# Patient Record
Sex: MALE | Race: WHITE | NOT HISPANIC OR LATINO | Employment: FULL TIME | URBAN - METROPOLITAN AREA
[De-identification: names, ages, dates, MRNs, and addresses within clinical notes are randomized per-mention and may not be internally consistent; named-entity substitution may affect disease eponyms.]

---

## 2020-08-20 ENCOUNTER — OFFICE VISIT CONVERTED (OUTPATIENT)
Dept: CARDIOLOGY | Facility: CLINIC | Age: 35
End: 2020-08-20
Attending: SPECIALIST

## 2020-08-28 ENCOUNTER — CONVERSION ENCOUNTER (OUTPATIENT)
Dept: CARDIOLOGY | Facility: CLINIC | Age: 35
End: 2020-08-28
Attending: SPECIALIST

## 2020-09-03 ENCOUNTER — HOSPITAL ENCOUNTER (OUTPATIENT)
Dept: URGENT CARE | Facility: CLINIC | Age: 35
Discharge: HOME | End: 2020-09-03
Attending: FAMILY MEDICINE

## 2021-03-16 ENCOUNTER — OFFICE VISIT CONVERTED (OUTPATIENT)
Dept: CARDIOLOGY | Facility: CLINIC | Age: 36
End: 2021-03-16
Attending: SPECIALIST

## 2021-05-10 NOTE — PROCEDURES
"   Procedure Note      Patient Name: Regino Fox   Patient ID: [id number]   Sex: Male   YOB: 1985    Primary Care Provider: MARY SARMIENTO   Referring Provider: MARY SARMIENTO    Visit Date: August 28, 2020    Provider: Lan Pollard MD   Location: [facility]   Location Address: [address] [place] [address], [address]   [place], [place]  [id number]   Location Phone: [phone]          FINAL REPORT   TRANSTHORACIC ECHOCARDIOGRAM REPORT    Diagnosis: Biscuspid Aortic Valve   Height: 6'6\" Weight: 171 B/P: 130/70 BSA: 2.1   Tech: BNS   MEASUREMENTS:  RVID (Diastole) : RVID. (NORMAL: 0.7 to 2.4 cm max)   LVID (Systole): 4.5 cm (Diastole): 6.8 cm . (NORMAL: 3.7 - 5.4 cm)   Posterior Wall Thickness (Diastole): 0.9 cm. (NORMAL: 0.8 - 1.1 cm)   Septal Thickness (Diastole): 1.0 cm. (NORMAL: 0.7 - 1.2 cm)   LAID (Systole): 3.8 cm. (NORMAL: 1.9 - 3.8 cm)   Aortic Root Diameter (Diastole): 3.6 cm. (NORMAL: 2.0 - 3.7 cm)   DOPPLER:  E/A ratio 2.5 (NORMAL 0.8-2.0)   DT: 206 msec (NORMAL 140-240 msec.)   IVRT 69 m/sec (NORMAL  m/sec.)   E/E': 10 (NORMAL <8 avg.)   COMMENTS:  The patient underwent 2-D, M-Mode, and Doppler examination, including pulse-wave, continuous-wave, and color-flow analysis; the study is technically adequate.   FINDINGS:  AORTIC VALVE: Normal. Tricuspid in appearance with normal central closure.   MITRAL VALVE: Normal. Bicuspid in appearance.   TRICUSPID VALVE: Normal.   PULMONIC VALVE: Not well visualized.   LEFT ATRIUM: Normal. No intracavitary masses or clots seen. LA volume index is 28 mL/m2.   AORTIC ROOT: Normal in size with adequate motion.   LEFT VENTRICLE: Enlarged. Normal left ventricular systolic function. Ejection fraction 59%.   RIGHT ATRIUM: Normal.   RIGHT VENTRICLE: Normal size and function.   PERICARDIUM: Unremarkable. No evidence of effusion.   INFERIOR VENA CAVA: Diameter is 2.6 cm.   DOPPLER: Doppler examination of the aortic, mitral, tricuspid, and " pulmonary valves was performed. Normal pulmonary artery systolic pressure by Doppler. There is moderate mitral regurgitation. Mild aortic regurgitation. Trace tricuspid regurgitation.   Faxed: 09/01/2020      CONCLUSION:  1.  Normal left ventricular systolic function with enlarged left ventricle.   2.  Moderate mitral regurgitation.   3.  Mild aortic regurgitation.   4.  Trace tricuspid regurgitation.       MD HONG Cordova/brittney    This note was transcribed by Ignacia Banks.  brittney/HONG  The above service was transcribed by Ignacia Banks, and I attest to the accuracy of the note.  HONG                 Electronically Signed by: Talia Banks-, Other -Author on September 1, 2020 01:54:13 PM  Electronically Co-signed by: Lan Pollard MD -Reviewer on September 1, 2020 03:00:23 PM

## 2021-05-10 NOTE — H&P
History and Physical      Patient Name: Regino Fox   Patient ID: [id number]   Sex: Male   YOB: 1985    Primary Care Provider: MARY SARMIENTO   Referring Provider: MARY SARMIENTO    Visit Date: August 20, 2020    Provider: Lan Pollard MD   Location: [facility]   Location Address: [address] [place] [address], [address]   [place], [place]  [id number]   Location Phone: [phone]          Chief Complaint  · Shortness of breath       History Of Present Illness  Consult requested by: MARY SARMIENTO   Regino Fox is a 34-year-old male with history of bicuspid aortic valve, aortic regurgitation, not compliant with his follow-up visit. He comes in with shortness of breath on exertion, mostly on heavy exertion. No chest pain. No syncopal or presyncopal episode. No palpitations.   PAST MEDICAL HISTORY: Heart murmur since childhood; congestive heart failure; hypertension; bicuspid aortic valve.   FAMILY HISTORY: Positive for diabetes and hypertension. Negative for heart disease.   PSYCHOSOCIAL HISTORY: He is . Positive for history of mood change or depression. He rarely has caffeine or alcohol. He previously smoked but quit.   CURRENT MEDICATIONS: None.   ALLERGIES: No known drug allergies.   CHOLESTEROL STATUS: Unknown.       Review of Systems  · Constitutional  o Admits  o : good general health lately  o Denies  o : fatigue, recent weight changes   · Eyes  o Admits  o : double vision, blurred vision  · HENT  o Denies  o : hearing loss or ringing, chronic sinus problem, swollen glands in neck  · Cardiovascular  o Admits  o : chest pain, palpitations (fast, fluttering, or skipping beats), shortness of breath while walking or lying flat  o Denies  o : swelling (feet, ankles, hands)  · Respiratory  o Denies  o : asthma or wheezing, COPD  · Gastrointestinal  o Admits  o : ulcers  o Denies  o : nausea or vomiting  · Neurologic  o Denies  o : lightheaded or  "dizzy, stroke, headaches  · Musculoskeletal  o Admits  o : joint pain, back pain  · Endocrine  o Admits  o : heat or cold intolerance  o Denies  o : thyroid disease, diabetes, excessive thirst or urination  · Heme-Lymph  o Denies  o : bleeding or bruising tendency, anemia      Vitals  Date Time BP Position Site L\R Cuff Size HR RR TEMP (F) WT  HT  BMI kg/m2 BSA m2 O2 Sat HC       08/20/2020 02:33 /70 Sitting    84 - R   171lbs 0oz 6'  5\" 20.28 2.05           Physical Examination  · Constitutional  o Appearance  o : Awake, alert, cooperative, pleasant.  · Eyes  o Pupils and Irises  o : Pupils equal and reacting to light and accommodation.  · Ears, Nose, Mouth and Throat  o Ears  o : Tympanic membranes are normal.  o Nose  o : Clear with no maxillary tenderness.  o Oral Cavity  o : Clear.  · Neck  o Inspection/Palpation  o : No JVD, bruits, thyromegaly, or adenopathy. Trachea midline. No axillary or cervical lymphadenopathy.  o Thyroid  o : No thyroid enlargement.   · Respiratory  o Inspection of Chest  o : No chest wall deformities, moving equal.  o Auscultation of Lungs  o : Good air entry with vesicular breath sounds.  o Percussion of Chest  o : Resonant bilaterally.   o Palpation of Chest  o : Equal movements bilaterally.  · Cardiovascular  o Heart  o :   § Auscultation of Heart  § : PMI is in the 5th intercostal space. S1 and S2 regular. No S3. No S4. Diastolic murmur 2-3/6.  o Peripheral Vascular System  o :   § Extremities  § : No pedal edema. Peripheral pulses well felt. No cyanosis.  · Gastrointestinal  o Abdominal Examination  o : No organomegaly, masses, tenderness, bruits, or abnormal pulsations. Bowel sounds are normal.  · Musculoskeletal  o General  o : Muscle strength is normal with normal tone.  · Skin and Subcutaneous Tissue  o General Inspection  o : No rash, petechiae, or suspicious skin lesions. Skin turgor is normal.          Assessment     ASSESSMENT AND PLAN:  Bicuspid aortic valve, aortic " valve regurgitation, moderate.  Repeat echocardiogram to evaluate any worsening of his aortic regurgitation. EKG and other reports were reviewed which showed sinus rhythm.       MD HONG Cordova/brittney    This note was transcribed by Ignacia Banks.  brittney/HONG  The above service was transcribed by Ignacia Banks, and I attest to the accuracy of the note.  HONG             Electronically Signed by: Talia Banks-, Other -Author on August 21, 2020 02:07:29 PM  Electronically Co-signed by: Lan Pollard MD -Reviewer on August 24, 2020 09:16:33 AM

## 2021-05-14 VITALS
DIASTOLIC BLOOD PRESSURE: 80 MMHG | BODY MASS INDEX: 20.07 KG/M2 | HEART RATE: 80 BPM | SYSTOLIC BLOOD PRESSURE: 116 MMHG | HEIGHT: 77 IN | WEIGHT: 170 LBS

## 2021-05-14 NOTE — PROGRESS NOTES
"   Progress Note      Patient Name: Regino Fox   Patient ID: [id number]   Sex: Male   YOB: 1985    Primary Care Provider: MARY SARMIENTO   Referring Provider: MARY SARMIENTO    Visit Date: March 16, 2021    Provider: Lan Pollard MD   Location[facility]   Location Address: [address] [place] [address], [address]   [place], [place]  [id number]   Location Phone: [phone]          Chief Complaint     Palpitations.       History Of Present Illness  Regino Fox is a 35 year old /White male with a history of aortic valve disease. He denies any chest pain. No shortness of breath. He has some occasional palpitations.   CURRENT MEDICATIONS: Medication list was reviewed and is as documented.   PAST MEDICAL HISTORY: Bicuspid aortic valve; Congestive heart failure; Heart murmur since childhood; Hypertension.   PSYCHOSOCIAL HISTORY: Previously smoked, but quit. Rarely consumes alcohol.      ALLERGIES: No known drug allergies.       Review of Systems  · Cardiovascular  o Admits  o : palpitations (fast, fluttering, or skipping beats), shortness of breath while walking or lying flat  o Denies  o : swelling (feet, ankles, hands), chest pain or angina pectoris   · Respiratory  o Denies  o : chronic or frequent cough      Vitals  Date Time BP Position Site L\R Cuff Size HR RR TEMP (F) WT  HT  BMI kg/m2 BSA m2 O2 Sat FR L/min FiO2 HC       03/16/2021 02:56 /80 Sitting    80 - R   170lbs 0oz 6'  5\" 20.16 2.05             Physical Examination  · Constitutional  o Appearance  o : Awake, alert, cooperative, pleasant.  · Respiratory  o Inspection of Chest  o : No chest wall deformities, moving equal.  o Auscultation of Lungs  o : Good air entry with vesicular breath sounds.  · Cardiovascular  o Heart  o :   § Auscultation of Heart  § : S1 and S2 regular. No S3. No S4. Diastolic murmur grade 3/6 present.   o Peripheral Vascular System  o :   § Extremities  § : Peripheral pulses were " well felt. No edema. No cyanosis.  · Gastrointestinal  o Abdominal Examination  o : No masses or organomegaly noted.          Assessment     ASSESSMENT & PLAN:    1.  Moderate aortic valve disease/aortic valve regurgitation, stable.    2.  Palpitations.  Restart him back on metoprolol 25 mg once a day.  3.  Follow up with his PMD.  4.  See me back in 6 months.             Electronically Signed by: Tiffany Padgett-, Other -Author on March 26, 2021 11:25:15 AM  Electronically Co-signed by: Lan Pollard MD -Reviewer on March 29, 2021 09:37:05 AM

## 2021-05-15 VITALS
WEIGHT: 171 LBS | SYSTOLIC BLOOD PRESSURE: 130 MMHG | HEIGHT: 77 IN | DIASTOLIC BLOOD PRESSURE: 70 MMHG | BODY MASS INDEX: 20.19 KG/M2 | HEART RATE: 84 BPM

## 2021-07-16 ENCOUNTER — OFFICE VISIT (OUTPATIENT)
Dept: CARDIOLOGY | Facility: CLINIC | Age: 36
End: 2021-07-16

## 2021-07-16 VITALS
SYSTOLIC BLOOD PRESSURE: 126 MMHG | DIASTOLIC BLOOD PRESSURE: 76 MMHG | WEIGHT: 167 LBS | HEIGHT: 78 IN | BODY MASS INDEX: 19.32 KG/M2 | HEART RATE: 74 BPM

## 2021-07-16 DIAGNOSIS — I38 VHD (VALVULAR HEART DISEASE): Primary | ICD-10-CM

## 2021-07-16 DIAGNOSIS — R06.09 DYSPNEA ON EXERTION: ICD-10-CM

## 2021-07-16 DIAGNOSIS — Q23.1 BICUSPID AORTIC VALVE: ICD-10-CM

## 2021-07-16 PROCEDURE — 93000 ELECTROCARDIOGRAM COMPLETE: CPT | Performed by: INTERNAL MEDICINE

## 2021-07-16 PROCEDURE — 99214 OFFICE O/P EST MOD 30 MIN: CPT | Performed by: INTERNAL MEDICINE

## 2021-07-16 NOTE — PROGRESS NOTES
Chief Complaint  Palpitations    Subjective            Regino Fox presents to [facility]  History of Present Illness    Regino is here for follow-up evaluation management of valvular heart disease with bicuspid aortic valve, mitral and aortic regurgitation and history of palpitations.  He has been clinically stable, he has decided to change cardiology providers.  He has occasional dyspnea on exertion which seems to be somewhat irregular and unpredictable.  He denies significant palpitations.  He works full-time.  He denies fluid weight gain no syncope.    PMH  Past Medical History:   Diagnosis Date   • Asthma    • CHF (congestive heart failure) (CMS/[facility])    • Congenital heart disease    • Heart murmur    • Heart valve disease    • Hypertension    • Mitral valve prolapse    • Stroke (CMS/[facility])          SURGICALHX  Past Surgical History:   Procedure Laterality Date   • FEMUR SURGERY     • NECK SURGERY          SOC  Social History     Socioeconomic History   • Marital status: Single     Spouse name: Not on file   • Number of children: Not on file   • Years of education: Not on file   • Highest education level: Not on file   Tobacco Use   • Smoking status: Former Smoker   • Smokeless tobacco: Never Used   Vaping Use   • Vaping Use: Every day   • Substances: Nicotine   • Devices: Disposable   Substance and Sexual Activity   • Alcohol use: Yes     Comment: rarely   • Drug use: Never   • Sexual activity: Defer         FAMHX  Family History   Problem Relation Age of Onset   • Aortic aneurysm Mother    • Heart attack Mother    • No Known Problems Father           ALLERGY  No Known Allergies     MEDSCURRENT  No current outpatient medications on file.      Review of Systems   Constitutional: Positive for malaise/fatigue.   Cardiovascular: Positive for dyspnea on exertion. Negative for chest pain.   Respiratory: Positive for shortness of breath.         Objective     /76   Pulse 74    "Ht 198.1 cm (78\")   Wt 75.8 kg (167 lb)   BMI 19.30 kg/m²       General Appearance:   · well developed  · well nourished  HENT:   · oropharynx moist  · lips not cyanotic  Neck:  · thyroid not enlarged  · supple  Respiratory:  · no respiratory distress  · normal breath sounds  · no rales  Cardiovascular:  · no jugular venous distention  · regular rhythm  · apical impulse normal  · S1 normal, S2 normal  · no S3, no S4   · Soft parasternal diastolic murmur radiating to the apex  · no rub, no thrill  · carotid pulses normal; no bruit  · pedal pulses normal  · lower extremity edema: none    Musculoskeletal:  · no clubbing of fingers.   · normocephalic, head atraumatic  Skin:   · warm, dry  Psychiatric:  · judgement and insight appropriate  · normal mood and affect      Result Review :                    ECG 12 Lead    Date/Time: 7/16/2021 2:16 PM  Performed by: ADRIANNA Bernard MD  Authorized by: ADRIANNA Bernard MD   Previous ECG: no previous ECG available  Rhythm: sinus rhythm and sinus bradycardia  Conduction: conduction normal  ST Segments: ST segments normal  T Waves: T waves normal  QRS axis: normal  Other: no other findings    Clinical impression: normal ECG                     Assessment and Plan        ASSESSMENT:  Encounter Diagnoses   Name Primary?   • VHD (valvular heart disease) Yes   • Dyspnea on exertion    • Bicuspid aortic valve          PLAN:    1.  Regino is clinically stable, 2020 echo showed moderate mitral regurgitation and mild aortic regurgitation.  A repeat study will be scheduled to reevaluate his valvular disease  2.  Basic laboratory studies including CMP, lipids, TSH will be scheduled  3.  Otherwise I have encouraged him to remain active and he will be seen annually          Patient was given instructions and counseling regarding his condition or for health maintenance advice. Please see specific information pulled into the AVS if appropriate.             ADRIANNA Bernard, " MD  7/16/2021    14:15 EDT

## 2021-07-28 ENCOUNTER — TELEPHONE (OUTPATIENT)
Dept: CARDIOLOGY | Facility: CLINIC | Age: 36
End: 2021-07-28

## 2021-07-30 ENCOUNTER — APPOINTMENT (OUTPATIENT)
Dept: GENERAL RADIOLOGY | Facility: HOSPITAL | Age: 36
End: 2021-07-30

## 2021-07-30 ENCOUNTER — HOSPITAL ENCOUNTER (EMERGENCY)
Facility: HOSPITAL | Age: 36
Discharge: HOME | End: 2021-07-30
Attending: EMERGENCY MEDICINE | Admitting: EMERGENCY MEDICINE

## 2021-07-30 VITALS
HEART RATE: 62 BPM | WEIGHT: 168.65 LBS | DIASTOLIC BLOOD PRESSURE: 71 MMHG | HEIGHT: 78 IN | OXYGEN SATURATION: 97 % | BODY MASS INDEX: 19.51 KG/M2 | RESPIRATION RATE: 16 BRPM | TEMPERATURE: 98.1 F | SYSTOLIC BLOOD PRESSURE: 102 MMHG

## 2021-07-30 DIAGNOSIS — R07.9 CHEST PAIN, UNSPECIFIED TYPE: Primary | ICD-10-CM

## 2021-07-30 LAB
ALBUMIN SERPL-MCNC: 4.5 G/DL (ref 3.5–5.2)
ALBUMIN/GLOB SERPL: 1.7 G/DL
ALP SERPL-CCNC: 68 U/L (ref 39–117)
ALT SERPL W P-5'-P-CCNC: 12 U/L (ref 1–41)
ANION GAP SERPL CALCULATED.3IONS-SCNC: 9.6 MMOL/L (ref 5–15)
AST SERPL-CCNC: 16 U/L (ref 1–40)
BASOPHILS # BLD AUTO: 0.05 10*3/MM3 (ref 0–0.2)
BASOPHILS NFR BLD AUTO: 0.8 % (ref 0–1.5)
BILIRUB SERPL-MCNC: 0.5 MG/DL (ref 0–1.2)
BUN SERPL-MCNC: 13 MG/DL (ref 6–20)
BUN/CREAT SERPL: 12.9 (ref 7–25)
CALCIUM SPEC-SCNC: 9.7 MG/DL (ref 8.6–10.5)
CHLORIDE SERPL-SCNC: 106 MMOL/L (ref 98–107)
CK MB SERPL-CCNC: 1.36 NG/ML
CK SERPL-CCNC: 63 U/L (ref 20–200)
CO2 SERPL-SCNC: 26.4 MMOL/L (ref 22–29)
CREAT SERPL-MCNC: 1.01 MG/DL (ref 0.76–1.27)
DEPRECATED RDW RBC AUTO: 41.3 FL (ref 37–54)
EOSINOPHIL # BLD AUTO: 0.22 10*3/MM3 (ref 0–0.4)
EOSINOPHIL NFR BLD AUTO: 3.3 % (ref 0.3–6.2)
ERYTHROCYTE [DISTWIDTH] IN BLOOD BY AUTOMATED COUNT: 12.8 % (ref 12.3–15.4)
GFR SERPL CREATININE-BSD FRML MDRD: 83 ML/MIN/1.73
GLOBULIN UR ELPH-MCNC: 2.6 GM/DL
GLUCOSE SERPL-MCNC: 105 MG/DL (ref 65–99)
HCT VFR BLD AUTO: 47.9 % (ref 37.5–51)
HGB BLD-MCNC: 16.4 G/DL (ref 13–17.7)
HOLD SPECIMEN: NORMAL
IMM GRANULOCYTES # BLD AUTO: 0.02 10*3/MM3 (ref 0–0.05)
IMM GRANULOCYTES NFR BLD AUTO: 0.3 % (ref 0–0.5)
LIPASE SERPL-CCNC: 33 U/L (ref 13–60)
LYMPHOCYTES # BLD AUTO: 2.29 10*3/MM3 (ref 0.7–3.1)
LYMPHOCYTES NFR BLD AUTO: 34.8 % (ref 19.6–45.3)
MAGNESIUM SERPL-MCNC: 1.9 MG/DL (ref 1.6–2.6)
MCH RBC QN AUTO: 30.4 PG (ref 26.6–33)
MCHC RBC AUTO-ENTMCNC: 34.2 G/DL (ref 31.5–35.7)
MCV RBC AUTO: 88.9 FL (ref 79–97)
MONOCYTES # BLD AUTO: 0.6 10*3/MM3 (ref 0.1–0.9)
MONOCYTES NFR BLD AUTO: 9.1 % (ref 5–12)
NEUTROPHILS NFR BLD AUTO: 3.4 10*3/MM3 (ref 1.7–7)
NEUTROPHILS NFR BLD AUTO: 51.7 % (ref 42.7–76)
NRBC BLD AUTO-RTO: 0 /100 WBC (ref 0–0.2)
NT-PROBNP SERPL-MCNC: 41.1 PG/ML (ref 0–450)
PLATELET # BLD AUTO: 131 10*3/MM3 (ref 140–450)
PMV BLD AUTO: 12.8 FL (ref 6–12)
POTASSIUM SERPL-SCNC: 4.2 MMOL/L (ref 3.5–5.2)
PROT SERPL-MCNC: 7.1 G/DL (ref 6–8.5)
RBC # BLD AUTO: 5.39 10*6/MM3 (ref 4.14–5.8)
SODIUM SERPL-SCNC: 142 MMOL/L (ref 136–145)
TROPONIN I SERPL-MCNC: 0 NG/ML (ref 0–0.6)
TROPONIN I SERPL-MCNC: 0 NG/ML (ref 0–0.6)
WBC # BLD AUTO: 6.58 10*3/MM3 (ref 3.4–10.8)
WHOLE BLOOD HOLD SPECIMEN: NORMAL

## 2021-07-30 PROCEDURE — 83690 ASSAY OF LIPASE: CPT | Performed by: EMERGENCY MEDICINE

## 2021-07-30 PROCEDURE — 93010 ELECTROCARDIOGRAM REPORT: CPT | Performed by: INTERNAL MEDICINE

## 2021-07-30 PROCEDURE — 83735 ASSAY OF MAGNESIUM: CPT | Performed by: EMERGENCY MEDICINE

## 2021-07-30 PROCEDURE — 93005 ELECTROCARDIOGRAM TRACING: CPT | Performed by: EMERGENCY MEDICINE

## 2021-07-30 PROCEDURE — 80053 COMPREHEN METABOLIC PANEL: CPT | Performed by: EMERGENCY MEDICINE

## 2021-07-30 PROCEDURE — 99283 EMERGENCY DEPT VISIT LOW MDM: CPT

## 2021-07-30 PROCEDURE — 82550 ASSAY OF CK (CPK): CPT | Performed by: EMERGENCY MEDICINE

## 2021-07-30 PROCEDURE — 85025 COMPLETE CBC W/AUTO DIFF WBC: CPT | Performed by: EMERGENCY MEDICINE

## 2021-07-30 PROCEDURE — 84484 ASSAY OF TROPONIN QUANT: CPT

## 2021-07-30 PROCEDURE — 93005 ELECTROCARDIOGRAM TRACING: CPT

## 2021-07-30 PROCEDURE — 71045 X-RAY EXAM CHEST 1 VIEW: CPT

## 2021-07-30 PROCEDURE — 63710000001 ONDANSETRON ODT 4 MG TABLET DISPERSIBLE: Performed by: EMERGENCY MEDICINE

## 2021-07-30 PROCEDURE — 83880 ASSAY OF NATRIURETIC PEPTIDE: CPT | Performed by: EMERGENCY MEDICINE

## 2021-07-30 PROCEDURE — 82553 CREATINE MB FRACTION: CPT | Performed by: EMERGENCY MEDICINE

## 2021-07-30 RX ORDER — SODIUM CHLORIDE 0.9 % (FLUSH) 0.9 %
10 SYRINGE (ML) INJECTION AS NEEDED
Status: DISCONTINUED | OUTPATIENT
Start: 2021-07-30 | End: 2021-07-30 | Stop reason: HOSPADM

## 2021-07-30 RX ORDER — FAMOTIDINE 20 MG/1
20 TABLET, FILM COATED ORAL 2 TIMES DAILY
Qty: 30 TABLET | Refills: 0 | Status: SHIPPED | OUTPATIENT
Start: 2021-07-30 | End: 2022-01-25

## 2021-07-30 RX ORDER — ONDANSETRON 4 MG/1
4 TABLET, ORALLY DISINTEGRATING ORAL ONCE
Status: COMPLETED | OUTPATIENT
Start: 2021-07-30 | End: 2021-07-30

## 2021-07-30 RX ORDER — ONDANSETRON 4 MG/1
4 TABLET, ORALLY DISINTEGRATING ORAL 4 TIMES DAILY PRN
Qty: 10 TABLET | Refills: 0 | Status: SHIPPED | OUTPATIENT
Start: 2021-07-30 | End: 2022-01-25

## 2021-07-30 RX ADMIN — ONDANSETRON 4 MG: 4 TABLET, ORALLY DISINTEGRATING ORAL at 04:54

## 2021-07-31 LAB
QT INTERVAL: 404 MS
QT INTERVAL: 442 MS

## 2021-08-23 ENCOUNTER — TELEPHONE (OUTPATIENT)
Dept: CARDIOLOGY | Facility: CLINIC | Age: 36
End: 2021-08-23

## 2021-08-23 NOTE — TELEPHONE ENCOUNTER
----- Message from ADRIANNA Bernard MD sent at 8/20/2021  3:29 PM EDT -----  Echo reviewed  Heart function was normal  Valve function was abnormal, he already knows he has a bicuspid aortic valve, this remains present with mild aortic regurgitation.  This is unchanged from the previous study.  He has mitral regurgitation also, it appears moderate, but it is an off angle jet and somewhat difficult to image by transthoracic echo.  I would continue the plan to see him annually unless he has worsening symptoms.  I think the next time we do an echo on him I would recommend a transesophageal echo (a bit more invasive, requiring sedation, but will give us better pictures and quantitation of mitral valve leaking) but this is not urgent and can be done when he otherwise would have followed up with me.    Call if symptoms worsen or new problems arise otherwise

## 2021-08-23 NOTE — TELEPHONE ENCOUNTER
Called and informed patient of results.    He states he has been experiencing chest pain and increased SOA x 4 days.  Reports sinus, congestion symptoms.    Patient is not taking anything for symptoms, only ibuprofen.  States he has not been tested for Covid.    I advised patient to be seen by PCP, urgent care.  Also advised on OTC medications.    Patient verbalized understanding.

## 2021-08-24 ENCOUNTER — APPOINTMENT (OUTPATIENT)
Dept: GENERAL RADIOLOGY | Facility: HOSPITAL | Age: 36
End: 2021-08-24

## 2021-08-24 ENCOUNTER — HOSPITAL ENCOUNTER (EMERGENCY)
Facility: HOSPITAL | Age: 36
Discharge: LEFT BEFORE DISCHARGE | End: 2021-08-24

## 2021-08-24 VITALS
DIASTOLIC BLOOD PRESSURE: 55 MMHG | TEMPERATURE: 98.6 F | WEIGHT: 169.75 LBS | HEART RATE: 72 BPM | HEIGHT: 78 IN | BODY MASS INDEX: 19.64 KG/M2 | RESPIRATION RATE: 18 BRPM | SYSTOLIC BLOOD PRESSURE: 106 MMHG | OXYGEN SATURATION: 97 %

## 2021-08-24 LAB
ALBUMIN SERPL-MCNC: 4.3 G/DL (ref 3.5–5.2)
ALBUMIN/GLOB SERPL: 1.9 G/DL
ALP SERPL-CCNC: 66 U/L (ref 39–117)
ALT SERPL W P-5'-P-CCNC: 11 U/L (ref 1–41)
ANION GAP SERPL CALCULATED.3IONS-SCNC: 11.5 MMOL/L (ref 5–15)
AST SERPL-CCNC: 16 U/L (ref 1–40)
BASOPHILS # BLD AUTO: 0.01 10*3/MM3 (ref 0–0.2)
BASOPHILS NFR BLD AUTO: 0.2 % (ref 0–1.5)
BILIRUB SERPL-MCNC: 0.4 MG/DL (ref 0–1.2)
BUN SERPL-MCNC: 11 MG/DL (ref 6–20)
BUN/CREAT SERPL: 9.9 (ref 7–25)
CALCIUM SPEC-SCNC: 8.7 MG/DL (ref 8.6–10.5)
CHLORIDE SERPL-SCNC: 101 MMOL/L (ref 98–107)
CO2 SERPL-SCNC: 25.5 MMOL/L (ref 22–29)
CREAT SERPL-MCNC: 1.11 MG/DL (ref 0.76–1.27)
DEPRECATED RDW RBC AUTO: 39.4 FL (ref 37–54)
EOSINOPHIL # BLD AUTO: 0.02 10*3/MM3 (ref 0–0.4)
EOSINOPHIL NFR BLD AUTO: 0.4 % (ref 0.3–6.2)
ERYTHROCYTE [DISTWIDTH] IN BLOOD BY AUTOMATED COUNT: 12.6 % (ref 12.3–15.4)
FLUAV AG NPH QL: NEGATIVE
FLUBV AG NPH QL IA: NEGATIVE
GFR SERPL CREATININE-BSD FRML MDRD: 75 ML/MIN/1.73
GLOBULIN UR ELPH-MCNC: 2.3 GM/DL
GLUCOSE SERPL-MCNC: 134 MG/DL (ref 65–99)
HCT VFR BLD AUTO: 42.3 % (ref 37.5–51)
HGB BLD-MCNC: 15 G/DL (ref 13–17.7)
HOLD SPECIMEN: NORMAL
HOLD SPECIMEN: NORMAL
IMM GRANULOCYTES # BLD AUTO: 0.02 10*3/MM3 (ref 0–0.05)
IMM GRANULOCYTES NFR BLD AUTO: 0.4 % (ref 0–0.5)
LYMPHOCYTES # BLD AUTO: 1.29 10*3/MM3 (ref 0.7–3.1)
LYMPHOCYTES NFR BLD AUTO: 24.4 % (ref 19.6–45.3)
MCH RBC QN AUTO: 30.4 PG (ref 26.6–33)
MCHC RBC AUTO-ENTMCNC: 35.5 G/DL (ref 31.5–35.7)
MCV RBC AUTO: 85.8 FL (ref 79–97)
MONOCYTES # BLD AUTO: 0.62 10*3/MM3 (ref 0.1–0.9)
MONOCYTES NFR BLD AUTO: 11.7 % (ref 5–12)
NEUTROPHILS NFR BLD AUTO: 3.33 10*3/MM3 (ref 1.7–7)
NEUTROPHILS NFR BLD AUTO: 62.9 % (ref 42.7–76)
NRBC BLD AUTO-RTO: 0 /100 WBC (ref 0–0.2)
NT-PROBNP SERPL-MCNC: 64.2 PG/ML (ref 0–450)
PLATELET # BLD AUTO: 86 10*3/MM3 (ref 140–450)
PMV BLD AUTO: 12.4 FL (ref 6–12)
POTASSIUM SERPL-SCNC: 3.9 MMOL/L (ref 3.5–5.2)
PROT SERPL-MCNC: 6.6 G/DL (ref 6–8.5)
QT INTERVAL: 378 MS
RBC # BLD AUTO: 4.93 10*6/MM3 (ref 4.14–5.8)
SARS-COV-2 RNA RESP QL NAA+PROBE: DETECTED
SODIUM SERPL-SCNC: 138 MMOL/L (ref 136–145)
TROPONIN T SERPL-MCNC: <0.01 NG/ML (ref 0–0.03)
WBC # BLD AUTO: 5.29 10*3/MM3 (ref 3.4–10.8)
WHOLE BLOOD HOLD SPECIMEN: NORMAL
WHOLE BLOOD HOLD SPECIMEN: NORMAL

## 2021-08-24 PROCEDURE — 36415 COLL VENOUS BLD VENIPUNCTURE: CPT

## 2021-08-24 PROCEDURE — 83880 ASSAY OF NATRIURETIC PEPTIDE: CPT

## 2021-08-24 PROCEDURE — 84484 ASSAY OF TROPONIN QUANT: CPT

## 2021-08-24 PROCEDURE — 80053 COMPREHEN METABOLIC PANEL: CPT

## 2021-08-24 PROCEDURE — 71045 X-RAY EXAM CHEST 1 VIEW: CPT

## 2021-08-24 PROCEDURE — 99211 OFF/OP EST MAY X REQ PHY/QHP: CPT

## 2021-08-24 PROCEDURE — 85025 COMPLETE CBC W/AUTO DIFF WBC: CPT

## 2021-08-24 PROCEDURE — U0003 INFECTIOUS AGENT DETECTION BY NUCLEIC ACID (DNA OR RNA); SEVERE ACUTE RESPIRATORY SYNDROME CORONAVIRUS 2 (SARS-COV-2) (CORONAVIRUS DISEASE [COVID-19]), AMPLIFIED PROBE TECHNIQUE, MAKING USE OF HIGH THROUGHPUT TECHNOLOGIES AS DESCRIBED BY CMS-2020-01-R: HCPCS

## 2021-08-24 PROCEDURE — 93005 ELECTROCARDIOGRAM TRACING: CPT

## 2021-08-24 PROCEDURE — 87804 INFLUENZA ASSAY W/OPTIC: CPT

## 2021-08-24 PROCEDURE — C9803 HOPD COVID-19 SPEC COLLECT: HCPCS

## 2021-08-24 RX ORDER — SODIUM CHLORIDE 0.9 % (FLUSH) 0.9 %
10 SYRINGE (ML) INJECTION AS NEEDED
Status: DISCONTINUED | OUTPATIENT
Start: 2021-08-24 | End: 2021-08-24 | Stop reason: HOSPADM

## 2021-08-31 PROCEDURE — U0004 COV-19 TEST NON-CDC HGH THRU: HCPCS | Performed by: PHYSICIAN ASSISTANT

## 2021-12-06 ENCOUNTER — LAB REQUISITION (OUTPATIENT)
Dept: LAB | Facility: HOSPITAL | Age: 36
End: 2021-12-06

## 2021-12-06 DIAGNOSIS — R19.7 DIARRHEA, UNSPECIFIED: ICD-10-CM

## 2021-12-06 LAB
027 TOXIN: NORMAL
C DIFF TOX GENS STL QL NAA+PROBE: NEGATIVE
HEMOCCULT STL QL IA: POSITIVE
LACTOFERRIN STL QL LA: NEGATIVE
RV AG STL QL IA: NEGATIVE

## 2021-12-06 PROCEDURE — 83630 LACTOFERRIN FECAL (QUAL): CPT | Performed by: NURSE PRACTITIONER

## 2021-12-06 PROCEDURE — 87493 C DIFF AMPLIFIED PROBE: CPT | Performed by: NURSE PRACTITIONER

## 2021-12-06 PROCEDURE — 87425 ROTAVIRUS AG IA: CPT | Performed by: NURSE PRACTITIONER

## 2021-12-06 PROCEDURE — 82274 ASSAY TEST FOR BLOOD FECAL: CPT | Performed by: NURSE PRACTITIONER

## 2022-01-18 ENCOUNTER — LAB (OUTPATIENT)
Dept: ONCOLOGY | Facility: HOSPITAL | Age: 37
End: 2022-01-18

## 2022-01-18 ENCOUNTER — CONSULT (OUTPATIENT)
Dept: ONCOLOGY | Facility: HOSPITAL | Age: 37
End: 2022-01-18

## 2022-01-18 VITALS
WEIGHT: 169.97 LBS | HEART RATE: 59 BPM | SYSTOLIC BLOOD PRESSURE: 108 MMHG | DIASTOLIC BLOOD PRESSURE: 68 MMHG | OXYGEN SATURATION: 100 % | TEMPERATURE: 97 F | BODY MASS INDEX: 19.64 KG/M2 | RESPIRATION RATE: 16 BRPM

## 2022-01-18 DIAGNOSIS — D69.6 THROMBOCYTOPENIA: Primary | ICD-10-CM

## 2022-01-18 DIAGNOSIS — D69.6 THROMBOCYTOPENIA: ICD-10-CM

## 2022-01-18 LAB
ALBUMIN SERPL-MCNC: 4.7 G/DL (ref 3.5–5.2)
ALBUMIN/GLOB SERPL: 1.7 G/DL
ALP SERPL-CCNC: 77 U/L (ref 39–117)
ALT SERPL W P-5'-P-CCNC: 15 U/L (ref 1–41)
ANION GAP SERPL CALCULATED.3IONS-SCNC: 9.5 MMOL/L (ref 5–15)
AST SERPL-CCNC: 17 U/L (ref 1–40)
BASOPHILS # BLD AUTO: 0.04 10*3/MM3 (ref 0–0.2)
BASOPHILS NFR BLD AUTO: 0.7 % (ref 0–1.5)
BILIRUB SERPL-MCNC: 0.8 MG/DL (ref 0–1.2)
BUN SERPL-MCNC: 13 MG/DL (ref 6–20)
BUN/CREAT SERPL: 11.8 (ref 7–25)
CALCIUM SPEC-SCNC: 9.5 MG/DL (ref 8.6–10.5)
CHLORIDE SERPL-SCNC: 103 MMOL/L (ref 98–107)
CO2 SERPL-SCNC: 26.5 MMOL/L (ref 22–29)
CREAT SERPL-MCNC: 1.1 MG/DL (ref 0.76–1.27)
DEPRECATED RDW RBC AUTO: 39.8 FL (ref 37–54)
EOSINOPHIL # BLD AUTO: 0.1 10*3/MM3 (ref 0–0.4)
EOSINOPHIL NFR BLD AUTO: 1.8 % (ref 0.3–6.2)
ERYTHROCYTE [DISTWIDTH] IN BLOOD BY AUTOMATED COUNT: 12.9 % (ref 12.3–15.4)
GFR SERPL CREATININE-BSD FRML MDRD: 75 ML/MIN/1.73
GLOBULIN UR ELPH-MCNC: 2.7 GM/DL
GLUCOSE SERPL-MCNC: 93 MG/DL (ref 65–99)
HCT VFR BLD AUTO: 48.2 % (ref 37.5–51)
HGB BLD-MCNC: 16.9 G/DL (ref 13–17.7)
IMM GRANULOCYTES # BLD AUTO: 0.02 10*3/MM3 (ref 0–0.05)
IMM GRANULOCYTES NFR BLD AUTO: 0.4 % (ref 0–0.5)
LYMPHOCYTES # BLD AUTO: 1.33 10*3/MM3 (ref 0.7–3.1)
LYMPHOCYTES NFR BLD AUTO: 23.4 % (ref 19.6–45.3)
MCH RBC QN AUTO: 29.9 PG (ref 26.6–33)
MCHC RBC AUTO-ENTMCNC: 35.1 G/DL (ref 31.5–35.7)
MCV RBC AUTO: 85.3 FL (ref 79–97)
MONOCYTES # BLD AUTO: 0.47 10*3/MM3 (ref 0.1–0.9)
MONOCYTES NFR BLD AUTO: 8.3 % (ref 5–12)
NEUTROPHILS NFR BLD AUTO: 3.72 10*3/MM3 (ref 1.7–7)
NEUTROPHILS NFR BLD AUTO: 65.4 % (ref 42.7–76)
NRBC BLD AUTO-RTO: 0 /100 WBC (ref 0–0.2)
PLATELET # BLD AUTO: 133 10*3/MM3 (ref 140–450)
PLATELETS (CITRATED) BY AUTOMATED COUNT: 116 10*3/MM3 (ref 140–450)
PMV BLD AUTO: 12.2 FL (ref 6–12)
POTASSIUM SERPL-SCNC: 4.2 MMOL/L (ref 3.5–5.2)
PROT SERPL-MCNC: 7.4 G/DL (ref 6–8.5)
RBC # BLD AUTO: 5.65 10*6/MM3 (ref 4.14–5.8)
SODIUM SERPL-SCNC: 139 MMOL/L (ref 136–145)
WBC NRBC COR # BLD: 5.68 10*3/MM3 (ref 3.4–10.8)

## 2022-01-18 PROCEDURE — 85025 COMPLETE CBC W/AUTO DIFF WBC: CPT

## 2022-01-18 PROCEDURE — G0463 HOSPITAL OUTPT CLINIC VISIT: HCPCS

## 2022-01-18 PROCEDURE — 80053 COMPREHEN METABOLIC PANEL: CPT

## 2022-01-18 PROCEDURE — 99203 OFFICE O/P NEW LOW 30 MIN: CPT | Performed by: INTERNAL MEDICINE

## 2022-01-18 PROCEDURE — 85049 AUTOMATED PLATELET COUNT: CPT

## 2022-01-18 PROCEDURE — 36415 COLL VENOUS BLD VENIPUNCTURE: CPT

## 2022-01-18 RX ORDER — DIAPER,BRIEF,INFANT-TODD,DISP
EACH MISCELLANEOUS
COMMUNITY
Start: 2021-11-16 | End: 2022-01-25

## 2022-01-18 RX ORDER — HYDROCORTISONE ACETATE SUPPOSITORY 30 MG/1
SUPPOSITORY RECTAL
COMMUNITY
Start: 2021-11-16 | End: 2022-01-25

## 2022-01-18 RX ORDER — ERGOCALCIFEROL 1.25 MG/1
CAPSULE ORAL
COMMUNITY
Start: 2021-11-29 | End: 2022-01-25

## 2022-01-18 RX ORDER — METRONIDAZOLE 500 MG/1
TABLET ORAL
COMMUNITY
Start: 2021-12-01 | End: 2022-01-25

## 2022-01-18 NOTE — PROGRESS NOTES
Regino Fox   : 1985     LOCATION: [facility]     Chief Complaint  Thrombocytopenia    Referring Provider: GUILLERMINA Mota  PCP: Donal Hutchison MD    Oncology/Hematology History    No history exists.           Subjective        History of Present Illness     36-year-old male sent for evaluation for thrombocytopenia  Previous CBC from 2019 shows white count 8.76 hemoglobin of 15.9 hematocrit of 46.6 and platelet count of 126K  Most recent CBC in chart from 2021 showed a white count of 5.29 hemoglobin of 15.0 hematocrit of 42.3 and platelet count of 86K  Patient denies any bleeding or bruising    Review of Systems   Constitutional: Positive for fatigue. Negative for appetite change, diaphoresis, fever, unexpected weight gain and unexpected weight loss.   HENT: Negative for hearing loss, sore throat and voice change.    Eyes: Negative for blurred vision, double vision, pain, redness and visual disturbance.   Respiratory: Negative for cough, shortness of breath and wheezing.    Cardiovascular: Negative for chest pain, palpitations and leg swelling.   Gastrointestinal: Positive for abdominal pain (left and right side), blood in stool, constipation, diarrhea, nausea, rectal pain and indigestion.   Endocrine: Negative for cold intolerance, heat intolerance, polydipsia and polyuria.   Genitourinary: Negative for decreased urine volume, difficulty urinating, frequency and urinary incontinence.   Musculoskeletal: Negative for arthralgias, back pain, joint swelling and myalgias.   Skin: Negative for color change, rash, skin lesions and wound.   Neurological: Negative for dizziness, seizures, numbness and headache.   Hematological: Negative for adenopathy. Does not bruise/bleed easily.   Psychiatric/Behavioral: Negative for depressed mood. The patient is nervous/anxious.    All other systems reviewed and are negative.    Current Outpatient Medications on File  Prior to Visit   Medication Sig Dispense Refill   • hydrocortisone 1 % cream      • Hydrocortisone Acetate 30 MG suppository      • metroNIDAZOLE (FLAGYL) 500 MG tablet      • vitamin D (ERGOCALCIFEROL) 1.25 MG (17223 UT) capsule capsule      • famotidine (Pepcid) 20 MG tablet Take 1 tablet by mouth 2 (Two) Times a Day. 30 tablet 0   • ondansetron ODT (ZOFRAN-ODT) 4 MG disintegrating tablet Place 1 tablet on the tongue 4 (Four) Times a Day As Needed for Nausea or Vomiting. 10 tablet 0     No current facility-administered medications on file prior to visit.       No Known Allergies    Past Medical History:   Diagnosis Date   • Asthma    • CHF (congestive heart failure) ([facility])    • Congenital heart disease    • Heart murmur    • Heart valve disease    • Hypertension    • Mitral valve prolapse    • Stroke (HCC)      Past Surgical History:   Procedure Laterality Date   • FEMUR SURGERY     • NECK SURGERY       Social History     Socioeconomic History   • Marital status: Single   Tobacco Use   • Smoking status: Former Smoker   • Smokeless tobacco: Never Used   Vaping Use   • Vaping Use: Every day   • Substances: Nicotine   • Devices: Disposable   Substance and Sexual Activity   • Alcohol use: Yes     Comment: rarely   • Drug use: Never   • Sexual activity: Defer     Family History   Problem Relation Age of Onset   • Aortic aneurysm Mother    • Heart attack Mother    • No Known Problems Father      Immunization History   Administered Date(s) Administered   • Td 08/04/1999       Objective     Vitals:    01/18/22 1342   BP: 108/68   Pulse: 59   Resp: 16   Temp: 97 °F (36.1 °C)   SpO2: 100%   Weight: 77.1 kg (169 lb 15.6 oz)   PainSc: 0-No pain     Body mass index is 19.64 kg/m².     Physical Exam    Deferred for discussion      No results found for: IRON, LABIRON, TRANSFERRIN, TIBC, FERRITIN, [id number], FOLATE  ECOG score: 0           PHQ-9 Total Score: 0         Result Review :   The following data was reviewed by: Mariella  MD Nathaniel on 01/18/2022:  Lab Results   Component Value Date    HGB 16.9 01/18/2022    HCT 48.2 01/18/2022    MCV 85.3 01/18/2022     (L) 01/18/2022    WBC 5.68 01/18/2022    NEUTROABS 3.72 01/18/2022    LYMPHSABS 1.33 01/18/2022    MONOSABS 0.47 01/18/2022    EOSABS 0.10 01/18/2022    BASOSABS 0.04 01/18/2022     Lab Results   Component Value Date    GLUCOSE 134 (H) 08/24/2021    BUN 11 08/24/2021    CREATININE 1.11 08/24/2021     08/24/2021    K 3.9 08/24/2021     08/24/2021    CO2 25.5 08/24/2021    CALCIUM 8.7 08/24/2021    PROTEINTOT 6.6 08/24/2021    ALBUMIN 4.30 08/24/2021    BILITOT 0.4 08/24/2021    ALKPHOS 66 08/24/2021    AST 16 08/24/2021    ALT 11 08/24/2021     Lab Results   Component Value Date    WBC 5.68 01/18/2022    HGB 16.9 01/18/2022    HCT 48.2 01/18/2022    MCV 85.3 01/18/2022     (L) 01/18/2022         Data reviewed: labs          Assessment and Plan      ASSESSMENT  Thrombocytopenia  PLAN/RECOMMENDATIONS  Patient has had thrombocytopenia dating as far back as 2019 with his platelet count normally in the 120k  His wbc and hemoglobin are within normal limits  His last CBC from August 24, 2021 revealed a platelet count of 86K  I 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   His platelet count is adequate for hemostasis and thus no intervention is needed.  Will repeat cbc today to assess platelet count to make sure not decreasing  F/u prn  Diagnoses and all orders for this visit:    1. Thrombocytopenia (HCC) (Primary)  -     CBC & Differential; Future  -     Comprehensive Metabolic Panel; Future  -     Platelet Ct On Citrated Bld; Future      I spent 30 minutes caring for Regino on this date of service. This time includes time spent by me in the following activities: reviewing tests, obtaining and/or reviewing a separately obtained history, counseling and educating the patient/family/caregiver, ordering medications, tests, or procedures, documenting information  in the medical record and independently interpreting results and communicating that information with the patient/family/caregiver    Patient was given instructions and counseling regarding his condition or for health maintenance advice. Please see specific information pulled into the AVS if appropriate.     Mariella Armstrong MD    1/18/2022

## 2022-01-28 ENCOUNTER — TELEMEDICINE (OUTPATIENT)
Dept: ONCOLOGY | Facility: HOSPITAL | Age: 37
End: 2022-01-28

## 2022-01-28 DIAGNOSIS — D69.6 THROMBOCYTOPENIA: Primary | ICD-10-CM

## 2022-01-28 PROCEDURE — 99212 OFFICE O/P EST SF 10 MIN: CPT | Performed by: INTERNAL MEDICINE

## 2022-01-28 NOTE — PROGRESS NOTES
Regino Fox   : 1985     LOCATION: [facility] HEMATOLOGY & ONCOLOGY   You have chosen to receive care through a telephone visit. Do you consent to use a telephone visit for your medical care today? Yes    Chief Complaint  Thrombocytopenia (-Televisit )    Referring Provider: Donal Hutchison MD  PCP: Donal Hutchison MD    Oncology/Hematology History    No history exists.         Subjective     {Problem List  Visit Diagnosis   Encounters  Notes  Medications  Labs  Result Review Imaging  Media :23}   History of Present Illness   Pt called for f/u thrombocytopenia   plated count repeated in citrated and reg purple tob tube reveals that platelet count was 116 and 133      Review of Systems  No current outpatient medications on file prior to visit.     No current facility-administered medications on file prior to visit.       No Known Allergies    Past Medical History:   Diagnosis Date   • Asthma    • CHF (congestive heart failure) (HCC)    • Congenital heart disease    • Heart murmur    • Heart valve disease    • Hypertension    • Mitral valve prolapse    • Stroke (HCC)      Past Surgical History:   Procedure Laterality Date   • FEMUR SURGERY     • NECK SURGERY       Social History     Socioeconomic History   • Marital status: Single   Tobacco Use   • Smoking status: Former Smoker   • Smokeless tobacco: Never Used   Vaping Use   • Vaping Use: Every day   • Substances: Nicotine   • Devices: Disposable   Substance and Sexual Activity   • Alcohol use: Yes     Comment: rarely   • Drug use: Never   • Sexual activity: Defer     Family History   Problem Relation Age of Onset   • Aortic aneurysm Mother    • Heart attack Mother    • No Known Problems Father      Immunization History   Administered Date(s) Administered   • Td 1999       Objective     There were no vitals filed for this visit.  There is no height or weight on file to calculate BMI.     Physical Exam    No PE for phone  appt      No results found for: IRON, LABIRON, TRANSFERRIN, TIBC, FERRITIN, [id number], FOLATE              PHQ-9 Total Score:           Result Review :   The following data was reviewed by: Mariella Armstrong MD on 01/28/2022:  Lab Results   Component Value Date    HGB 16.9 01/18/2022    HCT 48.2 01/18/2022    MCV 85.3 01/18/2022     (L) 01/18/2022     (L) 01/18/2022    WBC 5.68 01/18/2022    NEUTROABS 3.72 01/18/2022    LYMPHSABS 1.33 01/18/2022    MONOSABS 0.47 01/18/2022    EOSABS 0.10 01/18/2022    BASOSABS 0.04 01/18/2022     Lab Results   Component Value Date    GLUCOSE 93 01/18/2022    BUN 13 01/18/2022    CREATININE 1.10 01/18/2022     01/18/2022    K 4.2 01/18/2022     01/18/2022    CO2 26.5 01/18/2022    CALCIUM 9.5 01/18/2022    PROTEINTOT 7.4 01/18/2022    ALBUMIN 4.70 01/18/2022    BILITOT 0.8 01/18/2022    ALKPHOS 77 01/18/2022    AST 17 01/18/2022    ALT 15 01/18/2022         Data reviewed: labs          Assessment and Plan      ASSESSMENT   thrombocytopenia  PLAN/RECOMMENDATIONS  The platelet count has increased from 86K to the patient's normal baseline of one 16-133K.  With a platelet count above 100,000 no intervention is needed as the platelet count is adequate for hemostasis  No routine follow-up is needed      Diagnoses and all orders for this visit:    1. Thrombocytopenia (HCC) (Primary)      I spent 11 minutes caring for Regino on this date of service. This time includes time spent by me in the following activities: reviewing tests, counseling and educating the patient/family/caregiver, documenting information in the medical record and independently interpreting results and communicating that information with the patient/family/caregiver    Patient was given instructions and counseling regarding his condition or for health maintenance advice. Please see specific information pulled into the AVS if appropriate.     Mariella Armstrong MD    1/28/2022

## 2022-07-22 ENCOUNTER — PREP FOR SURGERY (OUTPATIENT)
Dept: OTHER | Facility: HOSPITAL | Age: 37
End: 2022-07-22

## 2022-07-22 ENCOUNTER — OFFICE VISIT (OUTPATIENT)
Dept: CARDIOLOGY | Facility: CLINIC | Age: 37
End: 2022-07-22

## 2022-07-22 VITALS
SYSTOLIC BLOOD PRESSURE: 122 MMHG | HEART RATE: 76 BPM | BODY MASS INDEX: 20.25 KG/M2 | DIASTOLIC BLOOD PRESSURE: 78 MMHG | HEIGHT: 78 IN | WEIGHT: 175 LBS

## 2022-07-22 DIAGNOSIS — I38 VHD (VALVULAR HEART DISEASE): Primary | ICD-10-CM

## 2022-07-22 DIAGNOSIS — Q23.1 BICUSPID AORTIC VALVE: ICD-10-CM

## 2022-07-22 PROCEDURE — 99214 OFFICE O/P EST MOD 30 MIN: CPT | Performed by: INTERNAL MEDICINE

## 2022-07-22 NOTE — PROGRESS NOTES
"Chief Complaint  VHD, KENNEDY, and Bicuspid Aortic Valve     Subjective            Regino Fox presents to [facility] CARDIOLOGY  History of Present Illness    Mr. Fox is here for follow-up evaluation management of bicuspid aortic valve with aortic mitral regurgitation.  Since last visit he is doing relatively well.  He reports he is more heat intolerant but has no other specific symptoms.  He is relatively sedentary.    PMH  Past Medical History:   Diagnosis Date   • Asthma    • CHF (congestive heart failure) ([facility])    • Congenital heart disease    • Heart murmur    • Heart valve disease    • Hypertension    • Mitral valve prolapse    • Stroke (HCC)          SURGICALHX  Past Surgical History:   Procedure Laterality Date   • FEMUR SURGERY     • NECK SURGERY          SOC  Social History     Socioeconomic History   • Marital status: Single   Tobacco Use   • Smoking status: Former Smoker   • Smokeless tobacco: Never Used   Vaping Use   • Vaping Use: Every day   • Substances: Nicotine   • Devices: Disposable   Substance and Sexual Activity   • Alcohol use: Yes     Comment: rarely   • Drug use: Never   • Sexual activity: Defer         FAMHX  Family History   Problem Relation Age of Onset   • Aortic aneurysm Mother    • Heart attack Mother    • No Known Problems Father           ALLERGY  No Known Allergies     MEDSCURRENT  No current outpatient medications on file.      Review of Systems   Constitutional: Positive for malaise/fatigue.   Cardiovascular: Negative for chest pain.        Objective     /78   Pulse 76   Ht 198.1 cm (78\")   Wt 79.4 kg (175 lb)   BMI 20.22 kg/m²       General Appearance:   · well developed  · well nourished  HENT:   · oropharynx moist  · lips not cyanotic  Neck:  · thyroid not enlarged  · supple  Respiratory:  · no respiratory distress  · normal breath sounds  · no rales  Cardiovascular:  · no jugular venous distention  · regular rhythm  · apical impulse " normal  · S1 normal, S2 normal  · no S3, no S4   · Soft apical systolic murmur  · no rub, no thrill  · carotid pulses normal; no bruit  · pedal pulses normal  · lower extremity edema: none    Musculoskeletal:  · no clubbing of fingers.   · normocephalic, head atraumatic  Skin:   · warm, dry  Psychiatric:  · judgement and insight appropriate  · normal mood and affect      Result Review :             Data reviewed: Echocardiogram last year showed moderate mitral regurgitation with an eccentric jet and bicuspid aortic valve with mild aortic regurgitation     Procedures           Assessment and Plan        ASSESSMENT:  Encounter Diagnoses   Name Primary?   • VHD (valvular heart disease) Yes   • Bicuspid aortic valve          PLAN:    1.  Mr. Fox had an echo last year which showed moderate mitral regurgitation but the jet was very eccentric and was difficult to quantitate on transthoracic study.  Additionally he has a known bicuspid aortic valve with mild aortic regurgitation.  I recommend a transesophageal echo for more detailed assessment of the degree of mitral valve leaking.  I discussed the risk and benefits with him today and he is agreeable to proceed.  We will schedule this with endoscopy department in August.  2.  Otherwise annual follow-up can be arranged            Patient was given instructions and counseling regarding his condition or for health maintenance advice. Please see specific information pulled into the AVS if appropriate.             ADRIANNA Bernard MD  7/22/2022    09:59 EDT

## 2022-08-08 PROBLEM — I38 VHD (VALVULAR HEART DISEASE): Status: ACTIVE | Noted: 2022-08-08

## 2022-08-11 ENCOUNTER — HOSPITAL ENCOUNTER (OUTPATIENT)
Facility: HOSPITAL | Age: 37
Setting detail: HOSPITAL OUTPATIENT SURGERY
Discharge: HOME | End: 2022-08-11
Attending: INTERNAL MEDICINE | Admitting: INTERNAL MEDICINE

## 2022-08-11 ENCOUNTER — HOSPITAL ENCOUNTER (OUTPATIENT)
Dept: CARDIOLOGY | Facility: HOSPITAL | Age: 37
Discharge: HOME | End: 2022-08-11

## 2022-08-11 ENCOUNTER — ANESTHESIA (OUTPATIENT)
Dept: GASTROENTEROLOGY | Facility: HOSPITAL | Age: 37
End: 2022-08-11

## 2022-08-11 ENCOUNTER — ANESTHESIA EVENT (OUTPATIENT)
Dept: GASTROENTEROLOGY | Facility: HOSPITAL | Age: 37
End: 2022-08-11

## 2022-08-11 VITALS
RESPIRATION RATE: 18 BRPM | HEIGHT: 78 IN | TEMPERATURE: 98 F | DIASTOLIC BLOOD PRESSURE: 74 MMHG | BODY MASS INDEX: 19.74 KG/M2 | SYSTOLIC BLOOD PRESSURE: 112 MMHG | OXYGEN SATURATION: 94 % | WEIGHT: 170.64 LBS | HEART RATE: 64 BPM

## 2022-08-11 DIAGNOSIS — I38 VHD (VALVULAR HEART DISEASE): ICD-10-CM

## 2022-08-11 PROCEDURE — 93312 ECHO TRANSESOPHAGEAL: CPT

## 2022-08-11 PROCEDURE — 25010000002 PROPOFOL 10 MG/ML EMULSION: Performed by: MARRIAGE & FAMILY THERAPIST

## 2022-08-11 PROCEDURE — 93320 DOPPLER ECHO COMPLETE: CPT | Performed by: INTERNAL MEDICINE

## 2022-08-11 PROCEDURE — 93325 DOPPLER ECHO COLOR FLOW MAPG: CPT | Performed by: INTERNAL MEDICINE

## 2022-08-11 PROCEDURE — 93325 DOPPLER ECHO COLOR FLOW MAPG: CPT

## 2022-08-11 PROCEDURE — 93320 DOPPLER ECHO COMPLETE: CPT

## 2022-08-11 PROCEDURE — 25010000002 ONDANSETRON PER 1 MG: Performed by: MARRIAGE & FAMILY THERAPIST

## 2022-08-11 PROCEDURE — 25010000002 FENTANYL CITRATE (PF) 50 MCG/ML SOLUTION: Performed by: MARRIAGE & FAMILY THERAPIST

## 2022-08-11 PROCEDURE — 93312 ECHO TRANSESOPHAGEAL: CPT | Performed by: INTERNAL MEDICINE

## 2022-08-11 RX ORDER — FENTANYL CITRATE 50 UG/ML
INJECTION, SOLUTION INTRAMUSCULAR; INTRAVENOUS AS NEEDED
Status: DISCONTINUED | OUTPATIENT
Start: 2022-08-11 | End: 2022-08-11 | Stop reason: SURG

## 2022-08-11 RX ORDER — ONDANSETRON 2 MG/ML
INJECTION INTRAMUSCULAR; INTRAVENOUS AS NEEDED
Status: DISCONTINUED | OUTPATIENT
Start: 2022-08-11 | End: 2022-08-11 | Stop reason: SURG

## 2022-08-11 RX ORDER — KETAMINE HCL IN NACL, ISO-OSM 100MG/10ML
SYRINGE (ML) INJECTION AS NEEDED
Status: DISCONTINUED | OUTPATIENT
Start: 2022-08-11 | End: 2022-08-11 | Stop reason: SURG

## 2022-08-11 RX ORDER — SODIUM CHLORIDE, SODIUM LACTATE, POTASSIUM CHLORIDE, CALCIUM CHLORIDE 600; 310; 30; 20 MG/100ML; MG/100ML; MG/100ML; MG/100ML
30 INJECTION, SOLUTION INTRAVENOUS CONTINUOUS
Status: DISCONTINUED | OUTPATIENT
Start: 2022-08-11 | End: 2022-08-11 | Stop reason: HOSPADM

## 2022-08-11 RX ORDER — LIDOCAINE HYDROCHLORIDE 20 MG/ML
INJECTION, SOLUTION EPIDURAL; INFILTRATION; INTRACAUDAL; PERINEURAL AS NEEDED
Status: DISCONTINUED | OUTPATIENT
Start: 2022-08-11 | End: 2022-08-11 | Stop reason: SURG

## 2022-08-11 RX ORDER — GLYCOPYRROLATE 0.2 MG/ML
INJECTION INTRAMUSCULAR; INTRAVENOUS AS NEEDED
Status: DISCONTINUED | OUTPATIENT
Start: 2022-08-11 | End: 2022-08-11 | Stop reason: SURG

## 2022-08-11 RX ADMIN — Medication 10 MG: at 14:46

## 2022-08-11 RX ADMIN — SODIUM CHLORIDE, POTASSIUM CHLORIDE, SODIUM LACTATE AND CALCIUM CHLORIDE 30 ML/HR: 600; 310; 30; 20 INJECTION, SOLUTION INTRAVENOUS at 14:19

## 2022-08-11 RX ADMIN — GLYCOPYRROLATE 0.1 MG: 0.2 INJECTION INTRAMUSCULAR; INTRAVENOUS at 14:45

## 2022-08-11 RX ADMIN — ONDANSETRON 8 MG: 2 INJECTION INTRAMUSCULAR; INTRAVENOUS at 15:21

## 2022-08-11 RX ADMIN — Medication 20 MG: at 14:48

## 2022-08-11 RX ADMIN — Medication 10 MG: at 14:47

## 2022-08-11 RX ADMIN — FENTANYL CITRATE 50 MCG: 50 INJECTION, SOLUTION INTRAMUSCULAR; INTRAVENOUS at 14:51

## 2022-08-11 RX ADMIN — PROPOFOL 250 MCG/KG/MIN: 10 INJECTION, EMULSION INTRAVENOUS at 14:46

## 2022-08-11 RX ADMIN — LIDOCAINE HYDROCHLORIDE 50 MG: 20 INJECTION, SOLUTION EPIDURAL; INFILTRATION; INTRACAUDAL; PERINEURAL at 14:46

## 2022-08-11 RX ADMIN — FENTANYL CITRATE 50 MCG: 50 INJECTION, SOLUTION INTRAMUSCULAR; INTRAVENOUS at 14:46

## 2022-08-11 RX ADMIN — Medication 10 MG: at 14:49

## 2022-08-11 NOTE — INTERVAL H&P NOTE
H&P reviewed. The patient was examined and there are no changes to the H&P.      Discussed in preop, proceed with [facility] Bernard MD

## 2022-08-11 NOTE — ANESTHESIA PREPROCEDURE EVALUATION
Anesthesia Evaluation     Patient summary reviewed and Nursing notes reviewed   no history of anesthetic complications:  NPO Solid Status: > 8 hours  NPO Liquid Status: > 2 hours           Airway   Mallampati: II  TM distance: >3 FB  Neck ROM: full  No difficulty expected  Dental      Pulmonary - normal exam    breath sounds clear to auscultation  (+) a smoker Former, asthma,shortness of breath,   Cardiovascular - normal exam  Exercise tolerance: good (4-7 METS)    Rhythm: regular  Rate: normal    (+) hypertension, valvular problems/murmurs (Bicuspid aortic valve) MR, CHF Diastolic >=55%,       Neuro/Psych  (+) CVA,    GI/Hepatic/Renal/Endo - negative ROS     Musculoskeletal (-) negative ROS    Abdominal    Substance History - negative use     OB/GYN negative ob/gyn ROS         Other - negative ROS                       Anesthesia Plan    ASA 3     general and MAC     (Patient understands anesthesia not responsible for dental damage.)  intravenous induction     Anesthetic plan, risks, benefits, and alternatives have been provided, discussed and informed consent has been obtained with: patient.    Use of blood products discussed with patient .   Plan discussed with CRNA.        CODE STATUS:

## 2022-08-11 NOTE — BRIEF OP NOTE
TRANSESOPHAGEAL ECHOCARDIOGRAM  Progress Note    Regino Fox  8/11/2022    Pre-op Diagnosis:   VHD (valvular heart disease) [I38]       Post-Op Diagnosis Codes:     * VHD (valvular heart disease) [I38]      Procedure(s):  TRANSESOPHAGEAL ECHOCARDIOGRAM    Surgeon(s):  ADRIANNA Bernard MD    Anesthesia: Monitored Anesthesia Care           Estimated Blood Loss: none    Specimens:                None         Findings: Large Anterior mitral valve leaflet with malcopating posterior scallop with the posterior leaflet.  Resulting in very eccentric severe mitral regurgitation.  The ERO is 0.6 cm2 in worst view.  There is pulmonary vein systolic reversal.    Tricuspid aortic valve  Mild AR  Normal Aortic root  No evidence of intracardiac shunt, negative bubble study  EF 50-55%  Normal RV function      Complications: none          Charles Bernard MD     Date: 8/11/2022  Time: 15:12 EDT

## 2022-08-11 NOTE — ANESTHESIA POSTPROCEDURE EVALUATION
Patient: Regino Fox    Procedure Summary     Date: 08/11/22 Room / Location: [facility] ENDOSCOPY 2 / [facility] ENDOSCOPY    Anesthesia Start: 1444 Anesthesia Stop: 1524    Procedure: TRANSESOPHAGEAL ECHOCARDIOGRAM (N/A Chest) Diagnosis:       VHD (valvular heart disease)      (VHD (valvular heart disease) [I38])    Surgeons: ADRIANNA Bernard MD Provider: Oumou Sweet MD    Anesthesia Type: general, MAC ASA Status: 3          Anesthesia Type: general, MAC    Vitals  Vitals Value Taken Time   /74 08/11/22 1538   Temp 36.7 °C (98 °F) 08/11/22 1538   Pulse 64 08/11/22 1538   Resp 18 08/11/22 1538   SpO2 94 % 08/11/22 1538           Post Anesthesia Care and Evaluation    Patient location during evaluation: bedside  Patient participation: complete - patient participated  Level of consciousness: awake  Pain management: adequate    Airway patency: patent  Anesthetic complications: No anesthetic complications  PONV Status: none  Cardiovascular status: acceptable and stable  Respiratory status: acceptable  Hydration status: acceptable    Comments: An Anesthesiologist personally participated in the most demanding procedures (including induction and emergence if applicable) in the anesthesia plan, monitored the course of anesthesia administration at frequent intervals and remained physically present and available for immediate diagnosis and treatment of emergencies.

## 2022-08-12 VITALS
DIASTOLIC BLOOD PRESSURE: 90 MMHG | HEART RATE: 70 BPM | RESPIRATION RATE: 16 BRPM | OXYGEN SATURATION: 92 % | SYSTOLIC BLOOD PRESSURE: 133 MMHG

## 2022-08-16 ENCOUNTER — HOSPITAL ENCOUNTER (OUTPATIENT)
Dept: CT IMAGING | Facility: HOSPITAL | Age: 37
Discharge: HOME | End: 2022-08-16
Admitting: NURSE PRACTITIONER

## 2022-08-16 ENCOUNTER — OFFICE VISIT (OUTPATIENT)
Dept: CARDIAC SURGERY | Facility: CLINIC | Age: 37
End: 2022-08-16

## 2022-08-16 VITALS
DIASTOLIC BLOOD PRESSURE: 78 MMHG | TEMPERATURE: 97.5 F | OXYGEN SATURATION: 90 % | WEIGHT: 178 LBS | BODY MASS INDEX: 20.59 KG/M2 | HEIGHT: 78 IN | SYSTOLIC BLOOD PRESSURE: 130 MMHG | HEART RATE: 83 BPM | RESPIRATION RATE: 20 BRPM

## 2022-08-16 DIAGNOSIS — I34.0 SEVERE MITRAL REGURGITATION: ICD-10-CM

## 2022-08-16 DIAGNOSIS — I38 VHD (VALVULAR HEART DISEASE): ICD-10-CM

## 2022-08-16 DIAGNOSIS — Q23.1 BICUSPID AORTIC VALVE: ICD-10-CM

## 2022-08-16 DIAGNOSIS — Q87.40 MARFAN SYNDROME: ICD-10-CM

## 2022-08-16 DIAGNOSIS — Q23.1 BICUSPID AORTIC VALVE: Primary | ICD-10-CM

## 2022-08-16 DIAGNOSIS — R07.89 CHEST PAIN, ATYPICAL: ICD-10-CM

## 2022-08-16 DIAGNOSIS — R06.09 DYSPNEA ON EFFORT: ICD-10-CM

## 2022-08-16 PROBLEM — Q23.81 BICUSPID AORTIC VALVE: Status: ACTIVE | Noted: 2022-08-16

## 2022-08-16 LAB
MAXIMAL PREDICTED HEART RATE: 182 BPM
STRESS TARGET HR: 155 BPM

## 2022-08-16 PROCEDURE — 99205 OFFICE O/P NEW HI 60 MIN: CPT | Performed by: THORACIC SURGERY (CARDIOTHORACIC VASCULAR SURGERY)

## 2022-08-16 PROCEDURE — 71260 CT THORAX DX C+: CPT

## 2022-08-16 PROCEDURE — 0 IOPAMIDOL PER 1 ML: Performed by: NURSE PRACTITIONER

## 2022-08-16 RX ADMIN — IOPAMIDOL 100 ML: 755 INJECTION, SOLUTION INTRAVENOUS at 14:25

## 2022-08-16 NOTE — PROGRESS NOTES
8/16/2022      Chief Complaint     Dyspnea of exertion    History of Present Illness:       Dear Wilmar and Colleagues,  It was nice to see Regino Fox in consultation at your request. He is a 36 y.o. male with a history of mitral regurgitation, heart murmur, hypertension and presumably a TIA or small stroke the past who has developed progressive dyspnea with exertion and fatigue.  Patient states that he has trouble climbing stairs he feels very short of breath.  The symptoms have worsened over the last 2 years and specifically the last few months.  He also states having some chest wall discomfort which is unrelated to effort. He denies syncope, PND, orthopnea, angina, lower extremity edema or syncope.  He had a 2D echo that showed moderate eccentric mitral regurgitation and a normal ejection function at [facility] follow and showed eccentric mitral valve regurgitation suggesting posterior prolapse.  He used to smoke for 8 years and quit 2 years ago and he vapes routinely.  There is history of Marfan's presumably in the mother side of the family.  He had 2 brothers lost in the [place] war and the father is very apprehensive and stressed about the prospect of an operation.  It is not clear to the circumstance where he had a weakness and contracture of the right arm but apparently self corrected and he was told he had a most likely stroke in the emergency room.  He denies a history of endocarditis or IV drug use.  Works as a .      Patient Active Problem List   Diagnosis   • VHD (valvular heart disease)   • Marfan syndrome   • Severe mitral regurgitation   • Bicuspid aortic valve   • Dyspnea on effort   • Chest pain, atypical       Past Medical History:   Diagnosis Date   • Asthma    • Bicuspid aortic valve    • Broken ankle, left, sequela    • CHF (congestive heart failure) ([facility])    • Congenital heart disease    • Heart murmur    • Heart valve disease    • Hypertension    • Mitral valve prolapse    • Stroke  (HCC)        Past Surgical History:   Procedure Laterality Date   • FEMUR SURGERY     • NECK SURGERY     • TRANSESOPHAGEAL ECHOCARDIOGRAM ([facility]) N/A 2022    Procedure: TRANSESOPHAGEAL ECHOCARDIOGRAM;  Surgeon: ADRIANNA Bernard MD;  Location: [facility] ENDOSCOPY;  Service: Cardiology;  Laterality: N/A;       No Known Allergies    No current outpatient medications on file.    Social History     Socioeconomic History   • Marital status: Single   Tobacco Use   • Smoking status: Former Smoker     Types: Cigarettes     Quit date:      Years since quittin.6   • Smokeless tobacco: Never Used   Vaping Use   • Vaping Use: Every day   • Substances: Nicotine   • Devices: Disposable   Substance and Sexual Activity   • Alcohol use: Yes     Comment: rarely   • Drug use: Never   • Sexual activity: Defer       Family History   Problem Relation Age of Onset   • Aortic aneurysm Mother    • Heart attack Mother    • No Known Problems Father        Review of Systems   Constitutional: Positive for fatigue.   Respiratory: Positive for shortness of breath.    Cardiovascular: Positive for chest pain and palpitations. Negative for leg swelling.   Neurological: Negative for dizziness, syncope, speech difficulty and weakness.   All other systems reviewed and are negative.      Physical Exam:    Vital Signs:  Weight: 80.7 kg (178 lb)   Body mass index is 20.57 kg/m².  Temp: 97.5 °F (36.4 °C)   Heart Rate: 83   BP: 130/78     Constitutional:       Appearance: Well-developed.   Eyes:      Conjunctiva/sclera: Conjunctivae normal.      Pupils: Pupils are equal, round, and reactive to light.   HENT:      Head: Normocephalic and atraumatic.      Nose: Nose normal.   Neck:      Thyroid: No thyromegaly.      Vascular: No JVD.      Lymphadenopathy: No cervical adenopathy.   Pulmonary:      Effort: Pulmonary effort is normal.      Breath sounds: Normal breath sounds. No rales.   Cardiovascular:      PMI at left midclavicular line. Normal rate.  Regular rhythm.      Murmurs: There is a grade 2/6 high frequency blowing holosystolic murmur at the apex.      No gallop.   Pulses:     Intact distal pulses. No decreased pulses.   Edema:     Peripheral edema absent.   Abdominal:      General: Bowel sounds are normal. There is no distension.      Palpations: Abdomen is soft. There is no abdominal mass.      Tenderness: There is no abdominal tenderness.   Musculoskeletal: Normal range of motion.         General: No tenderness or deformity.      Cervical back: Normal range of motion and neck supple. Skin:     General: Skin is warm and dry.      Findings: No erythema or rash.   Neurological:      Mental Status: Alert and oriented to person, place, and time.      Deep Tendon Reflexes: Reflexes are normal and symmetric.   Psychiatric:         Behavior: Behavior normal.     He has physical features of Marfan syndrome with a height of 6 foot 6 inch,laxe joints specifically in the wrists, scoliosis and mild pectum carinatum     Assessment:     Problems Addressed this Visit        Cardiac and Vasculature    VHD (valvular heart disease)    Severe mitral regurgitation    Bicuspid aortic valve - Primary    Relevant Orders    CT Chest With Contrast    Dyspnea on effort       Multi-system (Lupus, Sarcoid...)    Marfan syndrome       Symptoms and Signs    Chest pain, atypical      Diagnoses       Codes Comments    Bicuspid aortic valve    -  Primary ICD-10-CM: Q23.1  ICD-9-CM: 746.4     Chest pain, atypical     ICD-10-CM: R07.89  ICD-9-CM: 786.59     Marfan syndrome     ICD-10-CM: Q87.40  ICD-9-CM: 759.82     Dyspnea on effort     ICD-10-CM: R06.00  ICD-9-CM: 786.09     Severe mitral regurgitation     ICD-10-CM: I34.0  ICD-9-CM: 424.0     VHD (valvular heart disease)     ICD-10-CM: I38  ICD-9-CM: 424.90           Assessment/recommendation:     1. Marfan syndrome with phenotypic characteristics.  He has presumably family history of the disorder.  No known aortic aneurysm but he  has not had a CT scan of the chest.  Atypical chest discomfort on the left side most likely are musculoskeletal.  He has a bicuspid aortic valve and mild aortic regurgitation.  I recommend a chest CTA to rule out aneurysms.  2. Severe mitral regurgitation and progressive dyspnea with exertion.  He has heart failure symptoms class II/III.  Discussed with him my recommendation of mitral valve repair or replacement.  Quoted a repairability rate of over 95 to 98% and risk of operative mortality of less than 1% and complication rate of approximate 5%.  I discussed the use of a mechanical valve if repair is not feasible or fails.  Discussed the risks, benefits and terms of surgery and he wishes to proceed with it.  3. Even though he is 36 years of age, he has had atypical chest discomfort and he was a smoker.  We will favor thallium stress test and if positive then a cardiac cath.  This is negative, then I do not see a need for a cardiac cath    Thank you for allowing me to participate in his care.    Regards,    Matthew Gann M.D.    I spent over 60 minutes before, during and after the visit in reviewing the records, examining the patient, reviewing and interpreting myself the echocardiogram and [facility], discussing the findings and options with the patient and father and brother, discussing my recommendation of surgery with mitral valve repair if possible and documenting in the electronic record

## 2022-08-18 ENCOUNTER — PATIENT ROUNDING (BHMG ONLY) (OUTPATIENT)
Dept: CARDIAC SURGERY | Facility: CLINIC | Age: 37
End: 2022-08-18

## 2022-08-18 NOTE — PROGRESS NOTES
A My Chart message has been sent to the patient for PATIENT ROUNDING with [facility] [place]

## 2022-08-19 DIAGNOSIS — I38 VHD (VALVULAR HEART DISEASE): Primary | ICD-10-CM

## 2022-08-19 PROCEDURE — 99283 EMERGENCY DEPT VISIT LOW MDM: CPT

## 2022-08-20 ENCOUNTER — APPOINTMENT (OUTPATIENT)
Dept: GENERAL RADIOLOGY | Facility: HOSPITAL | Age: 37
End: 2022-08-20

## 2022-08-20 ENCOUNTER — HOSPITAL ENCOUNTER (EMERGENCY)
Facility: HOSPITAL | Age: 37
Discharge: HOME | End: 2022-08-20
Attending: EMERGENCY MEDICINE | Admitting: EMERGENCY MEDICINE

## 2022-08-20 VITALS
OXYGEN SATURATION: 97 % | DIASTOLIC BLOOD PRESSURE: 86 MMHG | BODY MASS INDEX: 20.13 KG/M2 | SYSTOLIC BLOOD PRESSURE: 115 MMHG | HEART RATE: 56 BPM | HEIGHT: 78 IN | WEIGHT: 173.94 LBS | TEMPERATURE: 98.4 F | RESPIRATION RATE: 18 BRPM

## 2022-08-20 DIAGNOSIS — R06.00 DYSPNEA, UNSPECIFIED TYPE: Primary | ICD-10-CM

## 2022-08-20 DIAGNOSIS — R07.9 CHEST PAIN OF UNKNOWN ETIOLOGY: ICD-10-CM

## 2022-08-20 DIAGNOSIS — R20.2 PARESTHESIA OF LEFT ARM: ICD-10-CM

## 2022-08-20 LAB
ALBUMIN SERPL-MCNC: 4.8 G/DL (ref 3.5–5.2)
ALBUMIN/GLOB SERPL: 2.3 G/DL
ALP SERPL-CCNC: 71 U/L (ref 39–117)
ALT SERPL W P-5'-P-CCNC: 10 U/L (ref 1–41)
ANION GAP SERPL CALCULATED.3IONS-SCNC: 8.3 MMOL/L (ref 5–15)
AST SERPL-CCNC: 14 U/L (ref 1–40)
BASOPHILS # BLD AUTO: 0.05 10*3/MM3 (ref 0–0.2)
BASOPHILS NFR BLD AUTO: 0.8 % (ref 0–1.5)
BILIRUB SERPL-MCNC: 0.7 MG/DL (ref 0–1.2)
BUN SERPL-MCNC: 12 MG/DL (ref 6–20)
BUN/CREAT SERPL: 12.2 (ref 7–25)
CALCIUM SPEC-SCNC: 9.5 MG/DL (ref 8.6–10.5)
CHLORIDE SERPL-SCNC: 106 MMOL/L (ref 98–107)
CO2 SERPL-SCNC: 26.7 MMOL/L (ref 22–29)
CREAT SERPL-MCNC: 0.98 MG/DL (ref 0.76–1.27)
DEPRECATED RDW RBC AUTO: 39.9 FL (ref 37–54)
EGFRCR SERPLBLD CKD-EPI 2021: 101.2 ML/MIN/1.73
EOSINOPHIL # BLD AUTO: 0.25 10*3/MM3 (ref 0–0.4)
EOSINOPHIL NFR BLD AUTO: 3.8 % (ref 0.3–6.2)
ERYTHROCYTE [DISTWIDTH] IN BLOOD BY AUTOMATED COUNT: 12.9 % (ref 12.3–15.4)
GLOBULIN UR ELPH-MCNC: 2.1 GM/DL
GLUCOSE SERPL-MCNC: 125 MG/DL (ref 65–99)
HCT VFR BLD AUTO: 45.8 % (ref 37.5–51)
HGB BLD-MCNC: 15.8 G/DL (ref 13–17.7)
HOLD SPECIMEN: NORMAL
HOLD SPECIMEN: NORMAL
IMM GRANULOCYTES # BLD AUTO: 0.01 10*3/MM3 (ref 0–0.05)
IMM GRANULOCYTES NFR BLD AUTO: 0.2 % (ref 0–0.5)
LYMPHOCYTES # BLD AUTO: 2.05 10*3/MM3 (ref 0.7–3.1)
LYMPHOCYTES NFR BLD AUTO: 30.8 % (ref 19.6–45.3)
MCH RBC QN AUTO: 29.5 PG (ref 26.6–33)
MCHC RBC AUTO-ENTMCNC: 34.5 G/DL (ref 31.5–35.7)
MCV RBC AUTO: 85.6 FL (ref 79–97)
MONOCYTES # BLD AUTO: 0.62 10*3/MM3 (ref 0.1–0.9)
MONOCYTES NFR BLD AUTO: 9.3 % (ref 5–12)
NEUTROPHILS NFR BLD AUTO: 3.68 10*3/MM3 (ref 1.7–7)
NEUTROPHILS NFR BLD AUTO: 55.1 % (ref 42.7–76)
NRBC BLD AUTO-RTO: 0 /100 WBC (ref 0–0.2)
NT-PROBNP SERPL-MCNC: 35.7 PG/ML (ref 0–450)
PLATELET # BLD AUTO: 144 10*3/MM3 (ref 140–450)
PMV BLD AUTO: 12.4 FL (ref 6–12)
POTASSIUM SERPL-SCNC: 4 MMOL/L (ref 3.5–5.2)
PROT SERPL-MCNC: 6.9 G/DL (ref 6–8.5)
QT INTERVAL: 409 MS
RBC # BLD AUTO: 5.35 10*6/MM3 (ref 4.14–5.8)
SODIUM SERPL-SCNC: 141 MMOL/L (ref 136–145)
TROPONIN T SERPL-MCNC: <0.01 NG/ML (ref 0–0.03)
WBC NRBC COR # BLD: 6.66 10*3/MM3 (ref 3.4–10.8)
WHOLE BLOOD HOLD COAG: NORMAL
WHOLE BLOOD HOLD SPECIMEN: NORMAL

## 2022-08-20 PROCEDURE — 36415 COLL VENOUS BLD VENIPUNCTURE: CPT

## 2022-08-20 PROCEDURE — 93010 ELECTROCARDIOGRAM REPORT: CPT | Performed by: INTERNAL MEDICINE

## 2022-08-20 PROCEDURE — 80053 COMPREHEN METABOLIC PANEL: CPT | Performed by: EMERGENCY MEDICINE

## 2022-08-20 PROCEDURE — 93005 ELECTROCARDIOGRAM TRACING: CPT

## 2022-08-20 PROCEDURE — 93005 ELECTROCARDIOGRAM TRACING: CPT | Performed by: EMERGENCY MEDICINE

## 2022-08-20 PROCEDURE — 85025 COMPLETE CBC W/AUTO DIFF WBC: CPT

## 2022-08-20 PROCEDURE — 84484 ASSAY OF TROPONIN QUANT: CPT | Performed by: EMERGENCY MEDICINE

## 2022-08-20 PROCEDURE — 71045 X-RAY EXAM CHEST 1 VIEW: CPT

## 2022-08-20 PROCEDURE — 83880 ASSAY OF NATRIURETIC PEPTIDE: CPT | Performed by: EMERGENCY MEDICINE

## 2022-08-20 RX ORDER — SODIUM CHLORIDE 0.9 % (FLUSH) 0.9 %
10 SYRINGE (ML) INJECTION AS NEEDED
Status: DISCONTINUED | OUTPATIENT
Start: 2022-08-20 | End: 2022-08-20 | Stop reason: HOSPADM

## 2022-08-20 NOTE — ED NOTES
Patient reports chest discomfort on the left side that starts under the arm and radiates to left shoulder area. Patient reports he is to have Open Heart Surgery in the near future for a leaking valve

## 2022-08-20 NOTE — ED PROVIDER NOTES
Time: 12:29 AM EDT  Arrived by: private car  Chief Complaint: SOB  History provided by: patient  History is limited by: N/A     History of Present Illness:  Patient is a 36 y.o. year old male who presents to the emergency department with SOB. Pt reports weakness and pressure under his left arm. Pt reports feeling nauseated and LOC. After, pt reports numbness and weakness in his arms right after the incident. Pt denies pain while taking a breath. Now, pt feels weakness and pressure under his left arm. Pt is going to have open heart surgery  soon and feels anxious.     HPI    Similar Symptoms Previously: no  Recently seen: no      Patient Care Team  Primary Care Provider: Donal Hutchison MD    Past Medical History:     No Known Allergies  Past Medical History:   Diagnosis Date   • Asthma    • Bicuspid aortic valve    • Broken ankle, left, sequela    • CHF (congestive heart failure) (HCC)    • Congenital heart disease    • Heart murmur    • Heart valve disease    • Hypertension    • Mitral valve prolapse    • Stroke (HCC)      Past Surgical History:   Procedure Laterality Date   • FEMUR SURGERY     • NECK SURGERY     • TRANSESOPHAGEAL ECHOCARDIOGRAM ([facility]) N/A 2022    Procedure: TRANSESOPHAGEAL ECHOCARDIOGRAM;  Surgeon: ADRIANNA Bernard MD;  Location: [facility] ENDOSCOPY;  Service: Cardiology;  Laterality: N/A;     Family History   Problem Relation Age of Onset   • Aortic aneurysm Mother    • Heart attack Mother    • No Known Problems Father        Home Medications:  Prior to Admission medications    Not on File        Social History:   Social History     Tobacco Use   • Smoking status: Former Smoker     Types: Cigarettes     Quit date:      Years since quittin.6   • Smokeless tobacco: Never Used   Vaping Use   • Vaping Use: Every day   • Substances: Nicotine   • Devices: Disposable   Substance Use Topics   • Alcohol use: Yes     Comment: rarely   • Drug use: Never     Recent travel: not applicable     Review  "of Systems:  Review of Systems   Constitutional: Negative for chills and fever.   HENT: Negative for sore throat.    Eyes: Negative for photophobia.   Respiratory: Positive for shortness of breath.    Cardiovascular: Positive for chest pain.   Gastrointestinal: Negative for abdominal pain, diarrhea, nausea and vomiting.   Genitourinary: Negative for dysuria.   Musculoskeletal: Negative for neck pain.   Skin: Negative for wound.   Neurological: Positive for weakness and numbness. Negative for headaches.   All other systems reviewed and are negative.       Physical Exam:  /73   Pulse 52   Temp 98.4 °F (36.9 °C)   Resp 18   Ht 198.1 cm (78\")   Wt 78.9 kg (173 lb 15.1 oz)   SpO2 96%   BMI 20.10 kg/m²     Physical Exam  Vitals and nursing note reviewed.   Constitutional:       General: He is not in acute distress.  HENT:      Head: Normocephalic and atraumatic.   Eyes:      Extraocular Movements: Extraocular movements intact.   Cardiovascular:      Rate and Rhythm: Normal rate and regular rhythm.      Heart sounds: Normal heart sounds. No murmur heard.  Pulmonary:      Effort: Pulmonary effort is normal. No respiratory distress.      Breath sounds: Normal breath sounds.   Abdominal:      General: Abdomen is flat.      Palpations: Abdomen is soft.      Tenderness: There is no abdominal tenderness.   Musculoskeletal:         General: Normal range of motion.      Cervical back: Normal range of motion and neck supple.      Right lower leg: No edema.      Left lower leg: No edema.   Skin:     General: Skin is warm and dry.      Capillary Refill: Capillary refill takes less than 2 seconds.   Neurological:      Mental Status: He is alert and oriented to person, place, and time. Mental status is at baseline.                Medications in the Emergency Department:  Medications   sodium chloride 0.9 % flush 10 mL (has no administration in time range)        Labs  Lab Results (last 24 hours)     Procedure Component " Value Units Date/Time    CBC & Differential [[id number]]  (Abnormal) Collected: 08/20/22 0007    Specimen: Blood Updated: 08/20/22 0019    Narrative:      The following orders were created for panel order CBC & Differential.  Procedure                               Abnormality         Status                     ---------                               -----------         ------                     CBC Auto Differential[[id number]]        Abnormal            Final result                 Please view results for these tests on the individual orders.    Comprehensive Metabolic Panel [[id number]]  (Abnormal) Collected: 08/20/22 0007    Specimen: Blood Updated: 08/20/22 0046     Glucose 125 mg/dL      BUN 12 mg/dL      Creatinine 0.98 mg/dL      Sodium 141 mmol/L      Potassium 4.0 mmol/L      Chloride 106 mmol/L      CO2 26.7 mmol/L      Calcium 9.5 mg/dL      Total Protein 6.9 g/dL      Albumin 4.80 g/dL      ALT (SGPT) 10 U/L      AST (SGOT) 14 U/L      Alkaline Phosphatase 71 U/L      Total Bilirubin 0.7 mg/dL      Globulin 2.1 gm/dL      A/G Ratio 2.3 g/dL      BUN/Creatinine Ratio 12.2     Anion Gap 8.3 mmol/L      eGFR 101.2 mL/min/1.73      Comment: National Kidney Foundation and American Society of Nephrology (ASN) Task Force recommended calculation based on the Chronic Kidney Disease Epidemiology Collaboration (CKD-EPI) equation refit without adjustment for race.       Narrative:      GFR Normal >60  Chronic Kidney Disease <60  Kidney Failure <15      BNP [[id number]]  (Normal) Collected: 08/20/22 0007    Specimen: Blood Updated: 08/20/22 0044     proBNP 35.7 pg/mL     Narrative:      Among patients with dyspnea, NT-proBNP is highly sensitive for the detection of acute congestive heart failure. In addition NT-proBNP of <300 pg/ml effectively rules out acute congestive heart failure with 99% negative predictive value.    Results may be falsely decreased if patient taking Biotin.      Troponin [[id number]]  (Normal)  Collected: 08/20/22 0007    Specimen: Blood Updated: 08/20/22 0045     Troponin T <0.010 ng/mL     Narrative:      Troponin T Reference Range:  <= 0.03 ng/mL-   Negative for AMI  >0.03 ng/mL-     Abnormal for myocardial necrosis.  Clinicians would have to utilize clinical acumen, EKG, Troponin and serial changes to determine if it is an Acute Myocardial Infarction or myocardial injury due to an underlying chronic condition.       Results may be falsely decreased if patient taking Biotin.      CBC Auto Differential [[id number]]  (Abnormal) Collected: 08/20/22 0007    Specimen: Blood Updated: 08/20/22 0019     WBC 6.66 10*3/mm3      RBC 5.35 10*6/mm3      Hemoglobin 15.8 g/dL      Hematocrit 45.8 %      MCV 85.6 fL      MCH 29.5 pg      MCHC 34.5 g/dL      RDW 12.9 %      RDW-SD 39.9 fl      MPV 12.4 fL      Platelets 144 10*3/mm3      Neutrophil % 55.1 %      Lymphocyte % 30.8 %      Monocyte % 9.3 %      Eosinophil % 3.8 %      Basophil % 0.8 %      Immature Grans % 0.2 %      Neutrophils, Absolute 3.68 10*3/mm3      Lymphocytes, Absolute 2.05 10*3/mm3      Monocytes, Absolute 0.62 10*3/mm3      Eosinophils, Absolute 0.25 10*3/mm3      Basophils, Absolute 0.05 10*3/mm3      Immature Grans, Absolute 0.01 10*3/mm3      nRBC 0.0 /100 WBC            Imaging:  XR Chest 1 View    Result Date: 8/20/2022  PROCEDURE: XR CHEST 1 VW  COMPARISON: [facility], , XR CHEST 1 VW, 8/24/2021, 1:16.  INDICATIONS: SOA, CHEST PAIN  FINDINGS:  There is no pneumothorax, pleural effusion or focal airspace consolidation. The heart size and pulmonary vasculature appear within normal limits. There are no acute osseous abnormalities.       No acute cardiopulmonary abnormality.       TRACEY RUSH MD       Electronically Signed and Approved By: TRACEY RUSH MD on 8/20/2022 at 1:08               Procedures:  Procedures    Progress  ED Course as of 08/20/22 0223   Sat Aug 20, 2022   0012 EKG:    Rhythm: Sinus bradycardia  Rate:  55  Intervals: Normal  T-wave: Normal  ST Segment: Nonspecific    EKG Comparison: 8/24/2021 V2 and inverted T wave normal sinus rhythm with rate of 68    Interpreted by me   [NL]      ED Course User Index  [NL] Ayden Whipple DO                            Medical Decision Making:  [facility]   HEART SCORE  History: Slightly Suspicious (0)  ECG: Nonspecific repolarization or LBBB (1)  Age: Less than 45 years (0)  Risk factors: 1-2 Risk Factors (1)  Troponin: normal (0)    This patient's HEART score is 2.    According to the HEART Score Study: Score (Risk of adverse cardiac event in the next 14 days)  Scores 0-3: (0.9-1.7%) In the HEART Score study, these patients were discharged home.  Scores 4-6: (12-16.6%)  In the HEART Score study, these patients were admitted to the hospital.  Scores ?7: (50-65%) In the HEART Score study, these patients were candidates for early invasive measures.   Final disposition is based on individual provider judgement and current clinical situation.    36-year-old male patient works as a  presents with complaint of feeling short of breath and some sharp left-sided chest pain that started earlier in the day.  Patient also woke up with some left arm numbness.  That same proved but he still feels some slight numbness in the left hand.  Patient's work-up is negative for any acute findings.  His pulse ox is 97% on room air.  His chest x-ray is negative.  EKG shows a sinus bradycardia.  His troponin enzyme was negative.  Patient's symptoms have mostly resolved at time of initial exam.  Patient states he was exposed to COVID by his mother 1 week ago who had tested positive.  Patient states he is done a COVID test during the week that was negative.  He is declining a COVID test in the emergency department.  Patient is considered stable for discharge with outpatient follow-up with his PCP.  Patient was advised to return if he has any worsening of symptoms.      Final diagnoses:   Dyspnea,  unspecified type   Chest pain of unknown etiology   Paresthesia of left arm        Disposition:  ED Disposition     ED Disposition   Discharge    Condition   Stable    Comment   --             This medical record created using voice recognition software.        Documentation assistance provided by Roberto Herrera acting as scribe for Ayden Whipple DO. Information recorded by the scribe was done at my direction and has been verified and validated by me.        Roberto Herrera  08/20/22 0048       Ayden Whipple DO  08/20/22 0223

## 2022-08-26 DIAGNOSIS — R07.2 PRECORDIAL PAIN: ICD-10-CM

## 2022-08-26 DIAGNOSIS — I38 VHD (VALVULAR HEART DISEASE): Primary | ICD-10-CM

## 2022-08-26 DIAGNOSIS — R06.09 DYSPNEA ON EXERTION: ICD-10-CM

## 2022-09-14 ENCOUNTER — HOSPITAL ENCOUNTER (OUTPATIENT)
Dept: NUCLEAR MEDICINE | Facility: HOSPITAL | Age: 37
Discharge: HOME | End: 2022-09-14

## 2022-09-14 DIAGNOSIS — I38 VHD (VALVULAR HEART DISEASE): ICD-10-CM

## 2022-09-14 PROCEDURE — 93018 CV STRESS TEST I&R ONLY: CPT | Performed by: INTERNAL MEDICINE

## 2022-09-14 PROCEDURE — A9502 TC99M TETROFOSMIN: HCPCS | Performed by: INTERNAL MEDICINE

## 2022-09-14 PROCEDURE — 78452 HT MUSCLE IMAGE SPECT MULT: CPT

## 2022-09-14 PROCEDURE — 93017 CV STRESS TEST TRACING ONLY: CPT

## 2022-09-14 PROCEDURE — 78452 HT MUSCLE IMAGE SPECT MULT: CPT | Performed by: INTERNAL MEDICINE

## 2022-09-14 PROCEDURE — 0 TECHNETIUM TETROFOSMIN KIT: Performed by: INTERNAL MEDICINE

## 2022-09-14 RX ADMIN — TETROFOSMIN 1 DOSE: 1.38 INJECTION, POWDER, LYOPHILIZED, FOR SOLUTION INTRAVENOUS at 09:20

## 2022-09-14 RX ADMIN — TETROFOSMIN 1 DOSE: 1.38 INJECTION, POWDER, LYOPHILIZED, FOR SOLUTION INTRAVENOUS at 11:17

## 2022-09-15 ENCOUNTER — TELEPHONE (OUTPATIENT)
Dept: CARDIOLOGY | Facility: CLINIC | Age: 37
End: 2022-09-15

## 2022-09-15 LAB
BH CV IMMEDIATE POST TECH DATA BLOOD PRESSURE: NORMAL MMHG
BH CV IMMEDIATE POST TECH DATA HEART RATE: 112 BPM
BH CV IMMEDIATE POST TECH DATA OXYGEN SATS: 97 %
BH CV REST NUCLEAR ISOTOPE DOSE: 9.7 MCI
BH CV SIX MINUTE RECOVERY TECH DATA BLOOD PRESSURE: NORMAL
BH CV SIX MINUTE RECOVERY TECH DATA HEART RATE: 85 BPM
BH CV SIX MINUTE RECOVERY TECH DATA OXYGEN SATURATION: 96 %
BH CV STRESS BP STAGE 1: NORMAL
BH CV STRESS BP STAGE 2: NORMAL
BH CV STRESS DURATION MIN STAGE 1: 3
BH CV STRESS DURATION MIN STAGE 2: 3
BH CV STRESS DURATION MIN STAGE 3: 3
BH CV STRESS DURATION SEC STAGE 1: 0
BH CV STRESS DURATION SEC STAGE 2: 0
BH CV STRESS DURATION SEC STAGE 3: 0
BH CV STRESS GRADE STAGE 1: 10
BH CV STRESS GRADE STAGE 2: 12
BH CV STRESS GRADE STAGE 3: 14
BH CV STRESS HR STAGE 1: 90
BH CV STRESS HR STAGE 2: 132
BH CV STRESS HR STAGE 3: 162
BH CV STRESS METS STAGE 1: 5
BH CV STRESS METS STAGE 2: 7.5
BH CV STRESS METS STAGE 3: 10
BH CV STRESS NUCLEAR ISOTOPE DOSE: 34.8 MCI
BH CV STRESS O2 STAGE 1: 97
BH CV STRESS O2 STAGE 2: 99
BH CV STRESS O2 STAGE 3: 98
BH CV STRESS PROTOCOL 1: NORMAL
BH CV STRESS RECOVERY BP: NORMAL MMHG
BH CV STRESS RECOVERY HR: 85 BPM
BH CV STRESS RECOVERY O2: 96 %
BH CV STRESS SPEED STAGE 1: 1.7
BH CV STRESS SPEED STAGE 2: 2.5
BH CV STRESS SPEED STAGE 3: 3.4
BH CV STRESS STAGE 1: 1
BH CV STRESS STAGE 2: 2
BH CV STRESS STAGE 3: 3
BH CV THREE MINUTE POST TECH DATA BLOOD PRESSURE: NORMAL MMHG
BH CV THREE MINUTE POST TECH DATA HEART RATE: 103 BPM
BH CV THREE MINUTE POST TECH DATA OXYGEN SATURATION: 98 %
LV EF NUC BP: 53 %
MAXIMAL PREDICTED HEART RATE: 182 BPM
PERCENT MAX PREDICTED HR: 92.86 %
STRESS BASELINE BP: NORMAL MMHG
STRESS BASELINE HR: 53 BPM
STRESS O2 SAT REST: 99 %
STRESS PERCENT HR: 109 %
STRESS POST ESTIMATED WORKLOAD: 11.6 METS
STRESS POST EXERCISE DUR MIN: 9 MIN
STRESS POST EXERCISE DUR SEC: 58 SEC
STRESS POST O2 SAT PEAK: 99 %
STRESS POST PEAK BP: NORMAL MMHG
STRESS POST PEAK HR: 169 BPM
STRESS TARGET HR: 155 BPM

## 2022-09-15 NOTE — TELEPHONE ENCOUNTER
----- Message from Negra Preston RN sent at 9/15/2022  9:40 AM EDT -----    ----- Message -----  From: ADRIANNA Bernard MD  Sent: 9/15/2022   9:22 AM EDT  To: Negra Preston RN    SPECT showed normal cardiac blood flow, no evidence of blocked arteries.    Plan is the patient is supposed to have valve repair surgery with Dr. Gann.  I have forwarded these results to him and sent him a message so Dr. Gann's office will be reaching out to the patient regarding surgery scheduling.

## 2022-09-27 ENCOUNTER — TELEPHONE (OUTPATIENT)
Dept: CARDIAC SURGERY | Facility: CLINIC | Age: 37
End: 2022-09-27

## 2022-09-27 NOTE — TELEPHONE ENCOUNTER
Spoke to pt regarding surgery. Pt states he will reach out to office the first week of November when his medical coverage kicks in. Placed pt on calendar to follow up in November.    0

## 2022-10-27 ENCOUNTER — TELEPHONE (OUTPATIENT)
Dept: CARDIAC SURGERY | Facility: CLINIC | Age: 37
End: 2022-10-27

## 2022-10-27 NOTE — TELEPHONE ENCOUNTER
Caller: Regino Fox    Relationship: Self    Best call back number:[phone]    What is the best time to reach you: AFTER 2 PM    Who are you requesting to speak with (clinical staff, provider,  specific staff member): CLINICAL    What was the call regarding:     PT CALLING IN TO LET US KNOW AN UPDATE ON MEDICAL COVERAGE.    PT IS TRYING TO FIGURE OUT IF HE COULD PUT OFF UPCOMING NOV VISIT ([facility] WANTED TO SEE HIM BACK IN NOV) OR IF [facility] FELT IT WAS URGENT TO SEE HIM SOONER.    PT STATED HIS NEW INSURANCE WONT KICK IN UNTIL JANUARY      Do you require a callback: YES, PLEASE CALL BACK PT TO ADVISE    PLEASE DO NOT CALL PT UNTIL AFTER 2PM- PT WORKS NIGHTS.

## 2022-10-31 NOTE — TELEPHONE ENCOUNTER
Notified pt that Dr. Gann is agreeable to postponing surgery. Pt states he is feeling a little worse. Advised him to follow up with Dr. Bernard and reach out when he is ready to schedule surgery. Pt verbalized understanding and agreed with plan of care.

## 2022-11-29 ENCOUNTER — PREP FOR SURGERY (OUTPATIENT)
Dept: OTHER | Facility: HOSPITAL | Age: 37
End: 2022-11-29

## 2022-11-29 DIAGNOSIS — I34.0 MITRAL VALVE INSUFFICIENCY, UNSPECIFIED ETIOLOGY: Primary | ICD-10-CM

## 2022-11-29 RX ORDER — CHLORHEXIDINE GLUCONATE 0.12 MG/ML
15 RINSE ORAL ONCE
Status: CANCELLED | OUTPATIENT
Start: 2022-11-29 | End: 2022-11-29

## 2022-11-29 RX ORDER — CHLORHEXIDINE GLUCONATE 0.12 MG/ML
15 RINSE ORAL EVERY 12 HOURS
Status: CANCELLED | OUTPATIENT
Start: 2022-11-29 | End: 2022-11-30

## 2022-11-29 RX ORDER — CHLORHEXIDINE GLUCONATE 500 MG/1
1 CLOTH TOPICAL EVERY 12 HOURS PRN
Status: CANCELLED | OUTPATIENT
Start: 2022-11-29

## 2022-11-29 RX ORDER — CEFAZOLIN SODIUM 2 G/100ML
2 INJECTION, SOLUTION INTRAVENOUS ONCE
Status: CANCELLED | OUTPATIENT
Start: 2022-11-29 | End: 2022-11-29

## 2022-12-01 ENCOUNTER — TELEPHONE (OUTPATIENT)
Dept: CARDIAC SURGERY | Facility: CLINIC | Age: 37
End: 2022-12-01

## 2022-12-01 NOTE — TELEPHONE ENCOUNTER
Spoke to patient with PAT and surgery information. PAT is on 12- at 0900 with any testing to follow.  Surgery is on 12- at 0730, arrival time is 0500. Expressed verbal understanding of these instructions. Instructed to call office with any further questions.

## 2022-12-10 ENCOUNTER — APPOINTMENT (OUTPATIENT)
Dept: GENERAL RADIOLOGY | Facility: HOSPITAL | Age: 37
End: 2022-12-10

## 2022-12-10 LAB
BASOPHILS # BLD AUTO: 0.05 10*3/MM3 (ref 0–0.2)
BASOPHILS NFR BLD AUTO: 0.7 % (ref 0–1.5)
DEPRECATED RDW RBC AUTO: 40.2 FL (ref 37–54)
EOSINOPHIL # BLD AUTO: 0.26 10*3/MM3 (ref 0–0.4)
EOSINOPHIL NFR BLD AUTO: 3.5 % (ref 0.3–6.2)
ERYTHROCYTE [DISTWIDTH] IN BLOOD BY AUTOMATED COUNT: 12.8 % (ref 12.3–15.4)
HCT VFR BLD AUTO: 45.6 % (ref 37.5–51)
HGB BLD-MCNC: 16 G/DL (ref 13–17.7)
IMM GRANULOCYTES # BLD AUTO: 0.03 10*3/MM3 (ref 0–0.05)
IMM GRANULOCYTES NFR BLD AUTO: 0.4 % (ref 0–0.5)
LYMPHOCYTES # BLD AUTO: 2.16 10*3/MM3 (ref 0.7–3.1)
LYMPHOCYTES NFR BLD AUTO: 29.4 % (ref 19.6–45.3)
MCH RBC QN AUTO: 30.4 PG (ref 26.6–33)
MCHC RBC AUTO-ENTMCNC: 35.1 G/DL (ref 31.5–35.7)
MCV RBC AUTO: 86.5 FL (ref 79–97)
MONOCYTES # BLD AUTO: 0.58 10*3/MM3 (ref 0.1–0.9)
MONOCYTES NFR BLD AUTO: 7.9 % (ref 5–12)
NEUTROPHILS NFR BLD AUTO: 4.26 10*3/MM3 (ref 1.7–7)
NEUTROPHILS NFR BLD AUTO: 58.1 % (ref 42.7–76)
NRBC BLD AUTO-RTO: 0 /100 WBC (ref 0–0.2)
PLATELET # BLD AUTO: 137 10*3/MM3 (ref 140–450)
PMV BLD AUTO: 12.5 FL (ref 6–12)
RBC # BLD AUTO: 5.27 10*6/MM3 (ref 4.14–5.8)
WBC NRBC COR # BLD: 7.34 10*3/MM3 (ref 3.4–10.8)

## 2022-12-10 PROCEDURE — 84484 ASSAY OF TROPONIN QUANT: CPT

## 2022-12-10 PROCEDURE — 83690 ASSAY OF LIPASE: CPT

## 2022-12-10 PROCEDURE — 93010 ELECTROCARDIOGRAM REPORT: CPT | Performed by: INTERNAL MEDICINE

## 2022-12-10 PROCEDURE — 36415 COLL VENOUS BLD VENIPUNCTURE: CPT

## 2022-12-10 PROCEDURE — 99283 EMERGENCY DEPT VISIT LOW MDM: CPT

## 2022-12-10 PROCEDURE — 93005 ELECTROCARDIOGRAM TRACING: CPT | Performed by: EMERGENCY MEDICINE

## 2022-12-10 PROCEDURE — 71045 X-RAY EXAM CHEST 1 VIEW: CPT

## 2022-12-10 PROCEDURE — 93005 ELECTROCARDIOGRAM TRACING: CPT

## 2022-12-10 PROCEDURE — 85025 COMPLETE CBC W/AUTO DIFF WBC: CPT

## 2022-12-10 PROCEDURE — 83735 ASSAY OF MAGNESIUM: CPT

## 2022-12-10 PROCEDURE — 80053 COMPREHEN METABOLIC PANEL: CPT

## 2022-12-10 PROCEDURE — 83880 ASSAY OF NATRIURETIC PEPTIDE: CPT

## 2022-12-10 RX ORDER — ASPIRIN 81 MG/1
324 TABLET, CHEWABLE ORAL ONCE
Status: DISCONTINUED | OUTPATIENT
Start: 2022-12-10 | End: 2022-12-11 | Stop reason: HOSPADM

## 2022-12-10 RX ORDER — SODIUM CHLORIDE 0.9 % (FLUSH) 0.9 %
10 SYRINGE (ML) INJECTION AS NEEDED
Status: DISCONTINUED | OUTPATIENT
Start: 2022-12-10 | End: 2022-12-11 | Stop reason: HOSPADM

## 2022-12-11 ENCOUNTER — HOSPITAL ENCOUNTER (EMERGENCY)
Facility: HOSPITAL | Age: 37
Discharge: HOME | End: 2022-12-11
Attending: EMERGENCY MEDICINE | Admitting: EMERGENCY MEDICINE

## 2022-12-11 VITALS
RESPIRATION RATE: 20 BRPM | DIASTOLIC BLOOD PRESSURE: 78 MMHG | SYSTOLIC BLOOD PRESSURE: 113 MMHG | OXYGEN SATURATION: 95 % | TEMPERATURE: 97.9 F | HEIGHT: 78 IN | BODY MASS INDEX: 21.09 KG/M2 | HEART RATE: 59 BPM | WEIGHT: 182.32 LBS

## 2022-12-11 DIAGNOSIS — R07.9 CHEST PAIN, UNSPECIFIED TYPE: Primary | ICD-10-CM

## 2022-12-11 LAB
ALBUMIN SERPL-MCNC: 4.5 G/DL (ref 3.5–5.2)
ALBUMIN/GLOB SERPL: 2 G/DL
ALP SERPL-CCNC: 68 U/L (ref 39–117)
ALT SERPL W P-5'-P-CCNC: 15 U/L (ref 1–41)
ANION GAP SERPL CALCULATED.3IONS-SCNC: 10.2 MMOL/L (ref 5–15)
AST SERPL-CCNC: 14 U/L (ref 1–40)
BILIRUB SERPL-MCNC: 0.3 MG/DL (ref 0–1.2)
BUN SERPL-MCNC: 14 MG/DL (ref 6–20)
BUN/CREAT SERPL: 13.2 (ref 7–25)
CALCIUM SPEC-SCNC: 9.5 MG/DL (ref 8.6–10.5)
CHLORIDE SERPL-SCNC: 104 MMOL/L (ref 98–107)
CO2 SERPL-SCNC: 26.8 MMOL/L (ref 22–29)
CREAT SERPL-MCNC: 1.06 MG/DL (ref 0.76–1.27)
EGFRCR SERPLBLD CKD-EPI 2021: 92.1 ML/MIN/1.73
GLOBULIN UR ELPH-MCNC: 2.3 GM/DL
GLUCOSE SERPL-MCNC: 118 MG/DL (ref 65–99)
HOLD SPECIMEN: NORMAL
LIPASE SERPL-CCNC: 37 U/L (ref 13–60)
MAGNESIUM SERPL-MCNC: 1.7 MG/DL (ref 1.6–2.6)
NT-PROBNP SERPL-MCNC: 50.2 PG/ML (ref 0–450)
POTASSIUM SERPL-SCNC: 3.9 MMOL/L (ref 3.5–5.2)
PROT SERPL-MCNC: 6.8 G/DL (ref 6–8.5)
QT INTERVAL: 398 MS
SODIUM SERPL-SCNC: 141 MMOL/L (ref 136–145)
TROPONIN I SERPL-MCNC: 0.01 NG/ML (ref 0–0.08)
TROPONIN I SERPL-MCNC: 0.01 NG/ML (ref 0–0.08)
WHOLE BLOOD HOLD COAG: NORMAL
WHOLE BLOOD HOLD SPECIMEN: NORMAL

## 2022-12-11 PROCEDURE — 84484 ASSAY OF TROPONIN QUANT: CPT

## 2022-12-11 NOTE — ED PROVIDER NOTES
Time: 1:10 AM EST    Chief Complaint: Chest Pain    History of Present Illness:      Patient is a 36 y.o. year old male that presents to the emergency department with chest pain. Pt reports a stabbing pain underneath his ribs, back and radiates up to here face. Pt reports that his electrolyte count is severely depleted. Pt reports that his legs and fingers are numb. Pt reports that he is having open heart surgery at the end of the month.  Pt also reports chest pressure. Pt reports that he believes that he has marfan syndrome due to extreme growth spurts and heart issues.      History provided by:  Patient   used: No        Similar Symptoms Previously: N/A  Recently seen: 22      Patient Care Team  Primary Care Provider: Donal Hutchison MD    Past Medical History:     No Known Allergies  Past Medical History:   Diagnosis Date   • Asthma    • Bicuspid aortic valve    • Broken ankle, left, sequela    • CHF (congestive heart failure) ([facility])    • Congenital heart disease    • Heart murmur    • Heart valve disease    • Hypertension    • Mitral valve prolapse    • Stroke (HCC)      Past Surgical History:   Procedure Laterality Date   • FEMUR SURGERY     • NECK SURGERY     • TRANSESOPHAGEAL ECHOCARDIOGRAM ([facility]) N/A 2022    Procedure: TRANSESOPHAGEAL ECHOCARDIOGRAM;  Surgeon: ADRIANNA Bernard MD;  Location: [facility];  Service: Cardiology;  Laterality: N/A;     Family History   Problem Relation Age of Onset   • Aortic aneurysm Mother    • Heart attack Mother    • No Known Problems Father        Home Medications:  Prior to Admission medications    Not on File        Social History:   Social History     Tobacco Use   • Smoking status: Former     Types: Cigarettes     Quit date:      Years since quittin.9   • Smokeless tobacco: Never   Vaping Use   • Vaping Use: Every day   • Substances: Nicotine   • Devices: Disposable   Substance Use Topics   • Alcohol use: Yes     Comment: rarely  "  • Drug use: Never       Record Review:  I have reviewed the patient's records in [facility].     Review of Systems:  Review of Systems   Constitutional: Negative for chills and fever.   HENT: Negative for congestion, rhinorrhea and sore throat.    Eyes: Negative for pain and visual disturbance.   Respiratory: Negative for apnea, cough, chest tightness and shortness of breath.    Cardiovascular: Positive for chest pain. Negative for palpitations.   Gastrointestinal: Negative for abdominal pain, diarrhea, nausea and vomiting.   Genitourinary: Negative for difficulty urinating and dysuria.   Musculoskeletal: Negative for joint swelling and myalgias.   Skin: Negative for color change.   Neurological: Positive for numbness. Negative for seizures and headaches.   Psychiatric/Behavioral: Negative.    All other systems reviewed and are negative.       Physical Exam:  /78   Pulse 59   Temp 97.9 °F (36.6 °C) (Oral)   Resp 20   Ht 198.1 cm (78\")   Wt 82.7 kg (182 lb 5.1 oz)   SpO2 95%   BMI 21.07 kg/m²     Physical Exam  Vitals and nursing note reviewed.   Constitutional:       General: He is not in acute distress.     Appearance: Normal appearance. He is not toxic-appearing.   HENT:      Head: Normocephalic and atraumatic.      Jaw: There is normal jaw occlusion.   Eyes:      General: Lids are normal.      Extraocular Movements: Extraocular movements intact.      Conjunctiva/sclera: Conjunctivae normal.      Pupils: Pupils are equal, round, and reactive to light.   Cardiovascular:      Rate and Rhythm: Normal rate and regular rhythm.      Pulses: Normal pulses.      Heart sounds: Normal heart sounds.   Pulmonary:      Effort: Pulmonary effort is normal. No respiratory distress.      Breath sounds: Normal breath sounds. No wheezing or rhonchi.   Abdominal:      General: Abdomen is flat.      Palpations: Abdomen is soft.      Tenderness: There is no abdominal tenderness. There is no guarding or rebound. "   Musculoskeletal:         General: Normal range of motion.      Cervical back: Normal range of motion and neck supple.      Right lower leg: No edema.      Left lower leg: No edema.   Skin:     General: Skin is warm and dry.   Neurological:      Mental Status: He is alert and oriented to person, place, and time. Mental status is at baseline.   Psychiatric:         Mood and Affect: Mood normal.                    Medications in the Emergency Department:  Medications - No data to display     Labs  Lab Results (last 24 hours)     Procedure Component Value Units Date/Time    POC Troponin I with Hold Tube [[id number]] Collected: 12/10/22 2340    Specimen: Blood Updated: 12/11/22 0053    Narrative:      The following orders were created for panel order POC Troponin I with Hold Tube.  Procedure                               Abnormality         Status                     ---------                               -----------         ------                     POC Troponin I[[id number]]                                                              HOLD Troponin-I Tube[[id number]]                             Final result                 Please view results for these tests on the individual orders.    CBC & Differential [[id number]]  (Abnormal) Collected: 12/10/22 2340    Specimen: Blood Updated: 12/10/22 2354    Narrative:      The following orders were created for panel order CBC & Differential.  Procedure                               Abnormality         Status                     ---------                               -----------         ------                     CBC Auto Differential[[id number]]        Abnormal            Final result                 Please view results for these tests on the individual orders.    Comprehensive Metabolic Panel [[id number]]  (Abnormal) Collected: 12/10/22 2340    Specimen: Blood Updated: 12/11/22 0016     Glucose 118 mg/dL      BUN 14 mg/dL      Creatinine 1.06 mg/dL      Sodium 141 mmol/L       Potassium 3.9 mmol/L      Chloride 104 mmol/L      CO2 26.8 mmol/L      Calcium 9.5 mg/dL      Total Protein 6.8 g/dL      Albumin 4.50 g/dL      ALT (SGPT) 15 U/L      AST (SGOT) 14 U/L      Alkaline Phosphatase 68 U/L      Total Bilirubin 0.3 mg/dL      Globulin 2.3 gm/dL      A/G Ratio 2.0 g/dL      BUN/Creatinine Ratio 13.2     Anion Gap 10.2 mmol/L      eGFR 92.1 mL/min/1.73      Comment: National Kidney Foundation and American Society of Nephrology (ASN) Task Force recommended calculation based on the Chronic Kidney Disease Epidemiology Collaboration (CKD-EPI) equation refit without adjustment for race.       Narrative:      GFR Normal >60  Chronic Kidney Disease <60  Kidney Failure <15      Lipase [[id number]]  (Normal) Collected: 12/10/22 2340    Specimen: Blood Updated: 12/11/22 0016     Lipase 37 U/L     BNP [[id number]]  (Normal) Collected: 12/10/22 2340    Specimen: Blood Updated: 12/11/22 0013     proBNP 50.2 pg/mL     Narrative:      Among patients with dyspnea, NT-proBNP is highly sensitive for the detection of acute congestive heart failure. In addition NT-proBNP of <300 pg/ml effectively rules out acute congestive heart failure with 99% negative predictive value.      Magnesium [[id number]]  (Normal) Collected: 12/10/22 2340    Specimen: Blood Updated: 12/11/22 0016     Magnesium 1.7 mg/dL     CBC Auto Differential [[id number]]  (Abnormal) Collected: 12/10/22 2340    Specimen: Blood Updated: 12/10/22 2354     WBC 7.34 10*3/mm3      RBC 5.27 10*6/mm3      Hemoglobin 16.0 g/dL      Hematocrit 45.6 %      MCV 86.5 fL      MCH 30.4 pg      MCHC 35.1 g/dL      RDW 12.8 %      RDW-SD 40.2 fl      MPV 12.5 fL      Platelets 137 10*3/mm3      Neutrophil % 58.1 %      Lymphocyte % 29.4 %      Monocyte % 7.9 %      Eosinophil % 3.5 %      Basophil % 0.7 %      Immature Grans % 0.4 %      Neutrophils, Absolute 4.26 10*3/mm3      Lymphocytes, Absolute 2.16 10*3/mm3      Monocytes, Absolute 0.58 10*3/mm3       Eosinophils, Absolute 0.26 10*3/mm3      Basophils, Absolute 0.05 10*3/mm3      Immature Grans, Absolute 0.03 10*3/mm3      nRBC 0.0 /100 WBC     POC Troponin I [[id number]]  (Normal) Collected: 12/10/22 2355    Specimen: Blood Updated: 12/11/22 0007     Troponin I 0.01 ng/mL      Comment: Serial Number: [id number]:  [id number]       POC Troponin I [[id number]]  (Normal) Collected: 12/11/22 0144    Specimen: Blood Updated: 12/11/22 0156     Troponin I 0.01 ng/mL      Comment: Serial Number: [id number]:  [id number]              Imaging:  XR Chest 1 View    Result Date: 12/11/2022  PROCEDURE: XR CHEST 1 VW  COMPARISON: [facility], CR, XR CHEST 1 VW, 8/20/2022, 0:30.  INDICATIONS: CHEST PAIN  FINDINGS:  The heart is normal in size.  The lungs are well-expanded and free of infiltrates.  Bony structures appear intact.       No active disease is seen.       YOJANA MILLER MD       Electronically Signed and Approved By: YOJANA MILLER MD on 12/11/2022 at 0:16               Procedures:  Procedures    Progress  ED Course as of 12/11/22 0749   Sun Dec 11, 2022   0100 EKG: Sinus rhythm 63, normal P wave, normal QRS, normal ST segment, normal QT, no change [JS]      ED Course User Index  [JS] Leon Beavers MD                            Medical Decision Making:  MDM  Number of Diagnoses or Management Options  Chest pain, unspecified type  Diagnosis management comments: HEART SCORE  History: Slightly Suspicious (0)  ECG: Normal (0)  Age: Less than 45 years (0)  Risk factors: 1-2 Risk Factors (1)  Troponin: normal (0)    This patient's HEART score is 1.    According to the HEART Score Study: Score (Risk of adverse cardiac event in the next 14 days)  Scores 0-3: (0.9-1.7%) In the HEART Score study, these patients were discharged home.  Scores 4-6: (12-16.6%)  In the HEART Score study, these patients were admitted to the hospital.  Scores =7: (50-65%) In the HEART Score study, these patients were candidates for early  invasive measures.   Final disposition is based on individual provider judgement and current clinical situation.         Amount and/or Complexity of Data Reviewed  Clinical lab tests: ordered and reviewed  Tests in the radiology section of CPT®: ordered and reviewed  Tests in the medicine section of CPT®: reviewed and ordered  Decide to obtain previous medical records or to obtain history from someone other than the patient: yes  Discuss the patient with other providers: yes         Final diagnoses:   Chest pain, unspecified type        Disposition:  ED Disposition     ED Disposition   Discharge    Condition   Stable    Comment   --             Dictated Utilizing Dragon Dictation    Documentation assistance provided by Moose Donohue acting as scribe for Leon Beavers MD. Information recorded by the scribe was done at my direction and has been verified and validated by me.          Moose Donohue  12/11/22 0119       Moose Donohue  12/11/22 0137       Leon Beavers MD  12/11/22 0748       Leon Beavers MD  12/11/22 0749

## 2022-12-28 ENCOUNTER — APPOINTMENT (OUTPATIENT)
Dept: PREADMISSION TESTING | Facility: HOSPITAL | Age: 37
End: 2022-12-28

## 2022-12-28 ENCOUNTER — TELEPHONE (OUTPATIENT)
Dept: CARDIAC SURGERY | Facility: CLINIC | Age: 37
End: 2022-12-28

## 2022-12-28 ENCOUNTER — APPOINTMENT (OUTPATIENT)
Dept: CARDIOLOGY | Facility: HOSPITAL | Age: 37
End: 2022-12-28

## 2022-12-28 NOTE — TELEPHONE ENCOUNTER
Spoke to patient with PAT and surgery times. PAT is on 2- at 0900 with any testing to follow. Surgery is on 2-1723 at 0730 with an 0500 arrival time.He expressed a verbal understanding of these instructions. He was instructed to call the office with any further questions.

## 2023-01-07 PROCEDURE — 87086 URINE CULTURE/COLONY COUNT: CPT | Performed by: NURSE PRACTITIONER

## 2023-01-07 PROCEDURE — 86592 SYPHILIS TEST NON-TREP QUAL: CPT | Performed by: NURSE PRACTITIONER

## 2023-01-07 PROCEDURE — 87491 CHLMYD TRACH DNA AMP PROBE: CPT | Performed by: NURSE PRACTITIONER

## 2023-01-07 PROCEDURE — 87591 N.GONORRHOEAE DNA AMP PROB: CPT | Performed by: NURSE PRACTITIONER

## 2023-01-07 PROCEDURE — G0432 EIA HIV-1/HIV-2 SCREEN: HCPCS | Performed by: NURSE PRACTITIONER

## 2023-01-07 PROCEDURE — 80074 ACUTE HEPATITIS PANEL: CPT | Performed by: NURSE PRACTITIONER

## 2023-01-09 ENCOUNTER — TELEPHONE (OUTPATIENT)
Dept: URGENT CARE | Facility: CLINIC | Age: 38
End: 2023-01-09
Payer: MEDICAID

## 2023-01-09 NOTE — TELEPHONE ENCOUNTER
Called patient, results reviewed, complete treatment as prescribed, follow up with PCP as needed or if symptoms worsen or persist, no sex x 1 week after treatment completed, patient to notify sexual partners of positive results so they can be treated as well, patient verbalizes understanding.

## 2023-01-11 ENCOUNTER — TELEPHONE (OUTPATIENT)
Dept: URGENT CARE | Facility: CLINIC | Age: 38
End: 2023-01-11
Payer: MEDICAID

## 2023-01-11 NOTE — TELEPHONE ENCOUNTER
----- Message from GUILLERMINA Tran sent at 1/10/2023  9:24 AM EST -----  Please notify patient of negative RPR test result.

## 2023-02-15 ENCOUNTER — PRE-ADMISSION TESTING (OUTPATIENT)
Dept: PREADMISSION TESTING | Facility: HOSPITAL | Age: 38
DRG: 220 | End: 2023-02-15
Payer: MEDICAID

## 2023-02-15 ENCOUNTER — HOSPITAL ENCOUNTER (OUTPATIENT)
Dept: GENERAL RADIOLOGY | Facility: HOSPITAL | Age: 38
Discharge: HOME | DRG: 220 | End: 2023-02-15
Payer: MEDICAID

## 2023-02-15 ENCOUNTER — HOSPITAL ENCOUNTER (OUTPATIENT)
Dept: CARDIOLOGY | Facility: HOSPITAL | Age: 38
Discharge: HOME | DRG: 220 | End: 2023-02-15
Payer: MEDICAID

## 2023-02-15 ENCOUNTER — ANESTHESIA EVENT (OUTPATIENT)
Dept: PERIOP | Facility: HOSPITAL | Age: 38
DRG: 220 | End: 2023-02-15
Payer: MEDICAID

## 2023-02-15 VITALS
DIASTOLIC BLOOD PRESSURE: 72 MMHG | SYSTOLIC BLOOD PRESSURE: 114 MMHG | TEMPERATURE: 97.6 F | OXYGEN SATURATION: 97 % | HEART RATE: 63 BPM | BODY MASS INDEX: 21.17 KG/M2 | WEIGHT: 183 LBS | RESPIRATION RATE: 16 BRPM | HEIGHT: 78 IN

## 2023-02-15 DIAGNOSIS — I34.0 MITRAL VALVE INSUFFICIENCY, UNSPECIFIED ETIOLOGY: ICD-10-CM

## 2023-02-15 LAB
ABO GROUP BLD: NORMAL
ALBUMIN SERPL-MCNC: 4.3 G/DL (ref 3.5–5.2)
ALBUMIN/GLOB SERPL: 1.9 G/DL
ALP SERPL-CCNC: 75 U/L (ref 39–117)
ALT SERPL W P-5'-P-CCNC: 16 U/L (ref 1–41)
ANION GAP SERPL CALCULATED.3IONS-SCNC: 5.8 MMOL/L (ref 5–15)
APTT PPP: 28.7 SECONDS (ref 22.7–35.4)
ARTERIAL PATENCY WRIST A: POSITIVE
AST SERPL-CCNC: 20 U/L (ref 1–40)
ATMOSPHERIC PRESS: 749.8 MMHG
BASE EXCESS BLDA CALC-SCNC: -0.3 MMOL/L (ref 0–2)
BASOPHILS # BLD AUTO: 0.07 10*3/MM3 (ref 0–0.2)
BASOPHILS NFR BLD AUTO: 1 % (ref 0–1.5)
BDY SITE: ABNORMAL
BH CV XLRA MEAS LEFT DIST CCA EDV: -18.8 CM/SEC
BH CV XLRA MEAS LEFT DIST CCA PSV: -82.1 CM/SEC
BH CV XLRA MEAS LEFT DIST ICA EDV: -23.2 CM/SEC
BH CV XLRA MEAS LEFT DIST ICA PSV: -65.2 CM/SEC
BH CV XLRA MEAS LEFT ICA/CCA RATIO: 0.79
BH CV XLRA MEAS LEFT MID ICA EDV: -23.6 CM/SEC
BH CV XLRA MEAS LEFT MID ICA PSV: -64.8 CM/SEC
BH CV XLRA MEAS LEFT PROX CCA EDV: 18.2 CM/SEC
BH CV XLRA MEAS LEFT PROX CCA PSV: 110 CM/SEC
BH CV XLRA MEAS LEFT PROX ECA EDV: -6.7 CM/SEC
BH CV XLRA MEAS LEFT PROX ECA PSV: -62.5 CM/SEC
BH CV XLRA MEAS LEFT PROX ICA EDV: -14.5 CM/SEC
BH CV XLRA MEAS LEFT PROX ICA PSV: -38.9 CM/SEC
BH CV XLRA MEAS LEFT PROX SCLA PSV: 145 CM/SEC
BH CV XLRA MEAS LEFT VERTEBRAL A EDV: -16.5 CM/SEC
BH CV XLRA MEAS LEFT VERTEBRAL A PSV: -46.4 CM/SEC
BH CV XLRA MEAS RIGHT DIST CCA EDV: -15.8 CM/SEC
BH CV XLRA MEAS RIGHT DIST CCA PSV: -95 CM/SEC
BH CV XLRA MEAS RIGHT DIST ICA EDV: -32.8 CM/SEC
BH CV XLRA MEAS RIGHT DIST ICA PSV: -74.5 CM/SEC
BH CV XLRA MEAS RIGHT ICA/CCA RATIO: 0.83
BH CV XLRA MEAS RIGHT MID ICA EDV: -28.7 CM/SEC
BH CV XLRA MEAS RIGHT MID ICA PSV: -79.2 CM/SEC
BH CV XLRA MEAS RIGHT PROX CCA EDV: 15.8 CM/SEC
BH CV XLRA MEAS RIGHT PROX CCA PSV: 105 CM/SEC
BH CV XLRA MEAS RIGHT PROX ECA EDV: -5.5 CM/SEC
BH CV XLRA MEAS RIGHT PROX ECA PSV: -57.4 CM/SEC
BH CV XLRA MEAS RIGHT PROX ICA EDV: -14.1 CM/SEC
BH CV XLRA MEAS RIGHT PROX ICA PSV: -73.9 CM/SEC
BH CV XLRA MEAS RIGHT PROX SCLA PSV: 129 CM/SEC
BH CV XLRA MEAS RIGHT VERTEBRAL A EDV: -16.4 CM/SEC
BH CV XLRA MEAS RIGHT VERTEBRAL A PSV: -58.6 CM/SEC
BILIRUB SERPL-MCNC: 0.4 MG/DL (ref 0–1.2)
BILIRUB UR QL STRIP: NEGATIVE
BLD GP AB SCN SERPL QL: NEGATIVE
BUN SERPL-MCNC: 11 MG/DL (ref 6–20)
BUN/CREAT SERPL: 10.9 (ref 7–25)
CALCIUM SPEC-SCNC: 9.1 MG/DL (ref 8.6–10.5)
CHLORIDE SERPL-SCNC: 104 MMOL/L (ref 98–107)
CHOLEST SERPL-MCNC: 146 MG/DL (ref 0–200)
CLARITY UR: ABNORMAL
CLOSE TME COLL+ADP + EPINEP PNL BLD: 95 % (ref 86–100)
CO2 SERPL-SCNC: 28.2 MMOL/L (ref 22–29)
COLOR UR: YELLOW
CREAT SERPL-MCNC: 1.01 MG/DL (ref 0.76–1.27)
DEPRECATED RDW RBC AUTO: 41.5 FL (ref 37–54)
EGFRCR SERPLBLD CKD-EPI 2021: 97 ML/MIN/1.73
EOSINOPHIL # BLD AUTO: 0.23 10*3/MM3 (ref 0–0.4)
EOSINOPHIL NFR BLD AUTO: 3.3 % (ref 0.3–6.2)
ERYTHROCYTE [DISTWIDTH] IN BLOOD BY AUTOMATED COUNT: 12.9 % (ref 12.3–15.4)
GLOBULIN UR ELPH-MCNC: 2.3 GM/DL
GLUCOSE SERPL-MCNC: 94 MG/DL (ref 65–99)
GLUCOSE UR STRIP-MCNC: NEGATIVE MG/DL
HBA1C MFR BLD: 5 % (ref 4.8–5.6)
HCO3 BLDA-SCNC: 24.7 MMOL/L (ref 22–28)
HCT VFR BLD AUTO: 46.8 % (ref 37.5–51)
HDLC SERPL-MCNC: 47 MG/DL (ref 40–60)
HGB BLD-MCNC: 15.8 G/DL (ref 13–17.7)
HGB UR QL STRIP.AUTO: NEGATIVE
IMM GRANULOCYTES # BLD AUTO: 0.05 10*3/MM3 (ref 0–0.05)
IMM GRANULOCYTES NFR BLD AUTO: 0.7 % (ref 0–0.5)
INR PPP: 1.08 (ref 0.9–1.1)
KETONES UR QL STRIP: NEGATIVE
LDLC SERPL CALC-MCNC: 85 MG/DL (ref 0–100)
LDLC/HDLC SERPL: 1.8 {RATIO}
LEUKOCYTE ESTERASE UR QL STRIP.AUTO: NEGATIVE
LYMPHOCYTES # BLD AUTO: 1.32 10*3/MM3 (ref 0.7–3.1)
LYMPHOCYTES NFR BLD AUTO: 19.1 % (ref 19.6–45.3)
MAGNESIUM SERPL-MCNC: 2 MG/DL (ref 1.6–2.6)
MAXIMAL PREDICTED HEART RATE: 181 BPM
MCH RBC QN AUTO: 29.8 PG (ref 26.6–33)
MCHC RBC AUTO-ENTMCNC: 33.8 G/DL (ref 31.5–35.7)
MCV RBC AUTO: 88.3 FL (ref 79–97)
MODALITY: ABNORMAL
MONOCYTES # BLD AUTO: 0.68 10*3/MM3 (ref 0.1–0.9)
MONOCYTES NFR BLD AUTO: 9.8 % (ref 5–12)
NEUTROPHILS NFR BLD AUTO: 4.57 10*3/MM3 (ref 1.7–7)
NEUTROPHILS NFR BLD AUTO: 66.1 % (ref 42.7–76)
NITRITE UR QL STRIP: NEGATIVE
NRBC BLD AUTO-RTO: 0 /100 WBC (ref 0–0.2)
NT-PROBNP SERPL-MCNC: 84.8 PG/ML (ref 0–450)
PCO2 BLDA: 41.1 MM HG (ref 35–45)
PH BLDA: 7.39 PH UNITS (ref 7.35–7.45)
PH UR STRIP.AUTO: 5.5 [PH] (ref 5–8)
PLATELET # BLD AUTO: 145 10*3/MM3 (ref 140–450)
PMV BLD AUTO: 12.5 FL (ref 6–12)
PO2 BLDA: 104.2 MM HG (ref 80–100)
POTASSIUM SERPL-SCNC: 4.2 MMOL/L (ref 3.5–5.2)
PROT SERPL-MCNC: 6.6 G/DL (ref 6–8.5)
PROT UR QL STRIP: NEGATIVE
PROTHROMBIN TIME: 14.2 SECONDS (ref 11.7–14.2)
QT INTERVAL: 421 MS
RBC # BLD AUTO: 5.3 10*6/MM3 (ref 4.14–5.8)
RH BLD: POSITIVE
SAO2 % BLDCOA: 97.9 % (ref 92–99)
SARS-COV-2 ORF1AB RESP QL NAA+PROBE: NOT DETECTED
SET MECH RESP RATE: 16
SODIUM SERPL-SCNC: 138 MMOL/L (ref 136–145)
SP GR UR STRIP: 1.02 (ref 1–1.03)
STRESS TARGET HR: 154 BPM
T&S EXPIRATION DATE: NORMAL
TRIGL SERPL-MCNC: 71 MG/DL (ref 0–150)
UROBILINOGEN UR QL STRIP: ABNORMAL
VLDLC SERPL-MCNC: 14 MG/DL (ref 5–40)
WBC NRBC COR # BLD: 6.92 10*3/MM3 (ref 3.4–10.8)

## 2023-02-15 PROCEDURE — C9803 HOPD COVID-19 SPEC COLLECT: HCPCS

## 2023-02-15 PROCEDURE — 93010 ELECTROCARDIOGRAM REPORT: CPT | Performed by: STUDENT IN AN ORGANIZED HEALTH CARE EDUCATION/TRAINING PROGRAM

## 2023-02-15 PROCEDURE — 85576 BLOOD PLATELET AGGREGATION: CPT

## 2023-02-15 PROCEDURE — 36415 COLL VENOUS BLD VENIPUNCTURE: CPT

## 2023-02-15 PROCEDURE — U0004 COV-19 TEST NON-CDC HGH THRU: HCPCS

## 2023-02-15 PROCEDURE — 80061 LIPID PANEL: CPT

## 2023-02-15 PROCEDURE — 85025 COMPLETE CBC W/AUTO DIFF WBC: CPT

## 2023-02-15 PROCEDURE — 83036 HEMOGLOBIN GLYCOSYLATED A1C: CPT

## 2023-02-15 PROCEDURE — 82803 BLOOD GASES ANY COMBINATION: CPT | Performed by: INTERNAL MEDICINE

## 2023-02-15 PROCEDURE — 86900 BLOOD TYPING SEROLOGIC ABO: CPT

## 2023-02-15 PROCEDURE — 81003 URINALYSIS AUTO W/O SCOPE: CPT

## 2023-02-15 PROCEDURE — 85730 THROMBOPLASTIN TIME PARTIAL: CPT

## 2023-02-15 PROCEDURE — 80053 COMPREHEN METABOLIC PANEL: CPT

## 2023-02-15 PROCEDURE — 86920 COMPATIBILITY TEST SPIN: CPT

## 2023-02-15 PROCEDURE — 36600 WITHDRAWAL OF ARTERIAL BLOOD: CPT | Performed by: INTERNAL MEDICINE

## 2023-02-15 PROCEDURE — 86901 BLOOD TYPING SEROLOGIC RH(D): CPT

## 2023-02-15 PROCEDURE — 86850 RBC ANTIBODY SCREEN: CPT

## 2023-02-15 PROCEDURE — 85610 PROTHROMBIN TIME: CPT

## 2023-02-15 PROCEDURE — 71046 X-RAY EXAM CHEST 2 VIEWS: CPT

## 2023-02-15 PROCEDURE — 93880 EXTRACRANIAL BILAT STUDY: CPT

## 2023-02-15 PROCEDURE — 83735 ASSAY OF MAGNESIUM: CPT

## 2023-02-15 PROCEDURE — 83880 ASSAY OF NATRIURETIC PEPTIDE: CPT

## 2023-02-15 PROCEDURE — 93005 ELECTROCARDIOGRAM TRACING: CPT

## 2023-02-15 RX ORDER — CHLORHEXIDINE GLUCONATE 0.12 MG/ML
15 RINSE ORAL
COMMUNITY
End: 2023-02-21 | Stop reason: HOSPADM

## 2023-02-15 RX ORDER — CHLORHEXIDINE GLUCONATE 0.12 MG/ML
15 RINSE ORAL EVERY 12 HOURS
Status: DISPENSED | OUTPATIENT
Start: 2023-02-15 | End: 2023-02-16

## 2023-02-15 RX ORDER — ACETAMINOPHEN, ASPIRIN AND CAFFEINE 250; 250; 65 MG/1; MG/1; MG/1
1 TABLET, FILM COATED ORAL EVERY 6 HOURS PRN
COMMUNITY
End: 2023-02-21 | Stop reason: HOSPADM

## 2023-02-15 NOTE — DISCHARGE INSTRUCTIONS
Take the following medications the morning of surgery with a small sip of water:    NO MEDS AM OF SURGERY UNLESS INSTRUCTED BY MD    ARRIVE AT 5:00    If you are on prescription narcotic pain medication to control your pain you may also take that medication the morning of surgery.    General Instructions:  Do not eat or drink anything after midnight the night before surgery.  Infants may have breast milk up to four hours before surgery.  Infants drinking formula may drink formula up to six hours before surgery.   Patients who avoid smoking, chewing tobacco and alcohol for 4 weeks prior to surgery have a reduced risk of post-operative complications.  Quit smoking as many days before surgery as you can.  Do not smoke, use chewing tobacco or drink alcohol the day of surgery.   If applicable bring your C-PAP/ BI-PAP machine.  Bring any papers given to you in the doctor’s office.  Wear clean comfortable clothes.  Do not wear contact lenses, false eyelashes or make-up.  Bring a case for your glasses.   Bring crutches or walker if applicable.  Remove all piercings.  Leave jewelry and any other valuables at home.  Hair extensions with metal clips must be removed prior to surgery.  The Pre-Admission Testing nurse will instruct you to bring medications if unable to obtain an accurate list in Pre-Admission Testing.        If you were given a blood bank ID arm band remember to bring it with you the day of surgery.    Preventing a Surgical Site Infection:  For 2 to 3 days before surgery, avoid shaving with a razor because the razor can irritate skin and make it easier to develop an infection.    Any areas of open skin can increase the risk of a post-operative wound infection by allowing bacteria to enter and travel throughout the body.  Notify your surgeon if you have any skin wounds / rashes even if it is not near the expected surgical site.  The area will need assessed to determine if surgery should be delayed until it is  healed.  The night prior to surgery shower using a fresh bar of anti-bacterial soap (such as Dial) and clean washcloth.  Sleep in a clean bed with clean clothing.  Do not allow pets to sleep with you.  Shower on the morning of surgery using a fresh bar of anti-bacterial soap (such as Dial) and clean washcloth.  Dry with a clean towel and dress in clean clothing.  Ask your surgeon if you will be receiving antibiotics prior to surgery.  Make sure you, your family, and all healthcare providers clean their hands with soap and water or an alcohol based hand  before caring for you or your wound.    Day of surgery:  Your arrival time is approximately two hours before your scheduled surgery time.  Upon arrival, a Pre-op nurse and Anesthesiologist will review your health history, obtain vital signs, and answer questions you may have.  The only belongings needed at this time will be your home medications and if applicable your C-PAP/BI-PAP machine.  A Pre-op nurse will start an IV and you may receive medication in preparation for surgery, including something to help you relax.      Please be aware that surgery does come with discomfort.  We want to make every effort to control your discomfort so please discuss any uncontrolled symptoms with your nurse.   Your doctor will most likely have prescribed pain medications.      If you are going home after surgery you will receive individualized written care instructions before being discharged.  A responsible adult must drive you to and from the hospital on the day of your surgery and stay with you for 24 hours.  Discharge prescriptions can be filled by the hospital pharmacy during regular pharmacy hours.  If you are having surgery late in the day/evening your prescription may be e-prescribed to your pharmacy.  Please verify your pharmacy hours or chose a 24 hour pharmacy to avoid not having access to your prescription because your pharmacy has closed for the day.    If you  are staying overnight following surgery, you will be transported to your hospital room following the recovery period.  [facility] has all private rooms.    If you have any questions please call Pre-Admission Testing at [phone].  Deductibles and co-payments are collected on the day of service. Please be prepared to pay the required co-pay, deductible or deposit on the day of service as defined by your plan.    Call your surgeon immediately if you experience any of the following symptoms:  Sore Throat  Shortness of Breath or difficulty breathing  Cough  Chills  Body soreness or muscle pain  Headache  Fever  New loss of taste or smell  Do not arrive for your surgery ill.  Your procedure will need to be rescheduled to another time.  You will need to call your physician before the day of surgery to avoid any unnecessary exposure to hospital staff as well as other patients.       CHLORHEXIDINE CLOTH INSTRUCTIONS  The morning of surgery follow these instructions using the Chlorhexidine cloths you've been given.  These steps reduce bacteria on the body.  Do not use the cloths near your eyes, ears mouth, genitalia or on open wounds.  Throw the cloths away after use but do not try to flush them down a toilet.      Open and remove one cloth at a time from the package.    Leave the cloth unfolded and begin the bathing.  Massage the skin with the cloths using gentle pressure to remove bacteria.  Do not scrub harshly.   Follow the steps below with one 2% CHG cloth per area (6 total cloths).  One cloth for neck, shoulders and chest.  One cloth for both arms, hands, fingers and underarms (do underarms last).  One cloth for the abdomen followed by groin.  One cloth for right leg and foot including between the toes.  One cloth for left leg and foot including between the toes.  The last cloth is to be used for the back of the neck, back and buttocks.    Allow the CHG to air dry 3 minutes on the skin which will  give it time to work and decrease the chance of irritation.  The skin may feel sticky until it is dry.  Do not rinse with water or any other liquid or you will lose the beneficial effects of the CHG.  If mild skin irritation occurs, do rinse the skin to remove the CHG.  Report this to the nurse at time of admission.  Do not apply lotions, creams, ointments, deodorants or perfumes after using the clothes. Dress in clean clothes before coming to the hospital.    BACTROBAN NASAL OINTMENT  There are many germs normally in your nose. Bactroban is an ointment that will help reduce these germs. Please follow these instructions for Bactroban use:      ____The day before surgery in the morning  Date________        ____The day of surgery in the morning    Date________    **Squirt ½ package of Bactroban Ointment onto a cotton applicator and apply to inside of 1st nostril.  Squirt the remaining Bactroban and apply to the inside of the other nostril.    PERIDEX- ORAL:  Use only if your surgeon has ordered  Use the night before and morning of surgery - Swish, gargle, and spit - do not swallow.

## 2023-02-15 NOTE — ANESTHESIA PREPROCEDURE EVALUATION
Anesthesia Evaluation     Patient summary reviewed and Nursing notes reviewed   no history of anesthetic complications:  NPO Solid Status: > 8 hours  NPO Liquid Status: > 8 hours           Airway   Mallampati: II  TM distance: >3 FB  Neck ROM: full  No difficulty expected  Dental - normal exam     Pulmonary    (+) a smoker (quit x 6 months ) Former, asthma (in childhood ),  (-) rhonchi, decreased breath sounds, wheezes  Cardiovascular   Exercise tolerance: good (4-7 METS)    Rhythm: regular  Rate: normal    (+) hypertension, valvular problems/murmurs AI and MR, CHF Diastolic >=55%,   (-) CAD, angina, KENNEDY, murmur    ROS comment: JEFFERSON borderline dilated LV severe MR mild to trace AI bicuspid valve LVEF 55%     Neuro/Psych  (+) TIA,    (-) CVA  GI/Hepatic/Renal/Endo    (-) no renal disease, diabetes    Musculoskeletal         ROS comment: Possible Marfans syndrome no formal diagnosis   Abdominal     Abdomen: soft.   Substance History      OB/GYN          Other                      Anesthesia Plan    ASA 4     general, Shilpa, CVL and PAC     (JEFFERSON discussed )  intravenous induction   Postoperative Plan: Expected vent after surgery  Anesthetic plan, risks, benefits, and alternatives have been provided, discussed and informed consent has been obtained with: patient and father.        CODE STATUS:

## 2023-02-15 NOTE — H&P
Name: Regino Fox ADMIT: (Not on file)   : 1985  PCP: Donal Hutchison MD    MRN: [id number] LOS: 0 days   AGE/SEX: 37 y.o. male  ROOM: Room/bed info not found     Chief Complaint: dyspnea    Subjective     History of Present Illness     Patient is a 37 y.o. male with a history of mitral regurgitation, heart murmur, hypertension and presumably a TIA or small stroke the past who has developed progressive dyspnea with exertion and fatigue. He states that his symptoms have worsened over the last 2 years and specifically the last few months. He also states having some left chest wall discomfort which is unrelated to effort.  He had a 2D echo that showed moderate eccentric mitral regurgitation and a normal ejection function at [facility]ERSON follow and showed eccentric mitral valve regurgitation suggesting posterior prolapse. There is history of Marfan's presumably in the mother side of the family. CT of the chest was without thoracic aortic dilation. He was seen by Dr. Gann in surgical consultation last August. At that time Dr. Gann recommended mitral valve repair or replacement with plans for mechanical prosthesis if replacement is necessary.  He reports rare ETOH, he is a former smoker and reports he currently vapes. He denies a history of endocarditis or IV drug use. I am seeing him for the first time today in [facility] where he presents for preoperative testing prior to his upcoming surgery. His father is present for the visit today. Since seeing Dr. Gann he underwent stress test which was without evidence of ischemia. He otherwise denies any changes to his medical record since last being seen.     Past Medical History:   Diagnosis Date   • Asthma    • Bicuspid aortic valve    • Broken ankle, left, sequela     HX   • CHF (congestive heart failure) ([facility])    • Congenital heart disease    • Dyspnea on exertion    • Heart murmur    • Heart valve disease    • History of migraine    • Hypertension     QUIT TAKING MEDS  AGE 19 DUE TO INSURANCE REASONS   • Marfan syndrome    • Mitral valve prolapse    • Scoliosis    • TIA (transient ischemic attack)     HX     Past Surgical History:   Procedure Laterality Date   • FEMUR SURGERY Right     X2 FOR FRACTURE AGE 11   • LYMPH NODE DISSECTION      NECK   • TRANSESOPHAGEAL ECHOCARDIOGRAM (JEFFERSON) N/A 08/11/2022    Procedure: TRANSESOPHAGEAL ECHOCARDIOGRAM;  Surgeon: ADRIANNA Bernard MD;  Location: [facility] ENDOSCOPY;  Service: Cardiology;  Laterality: N/A;     Family History   Problem Relation Age of Onset   • Aortic aneurysm Mother    • Heart attack Mother    • No Known Problems Father    • Malig Hyperthermia Neg Hx      Social History     Tobacco Use   • Smoking status: Former     Types: Cigarettes     Quit date: 2020     Years since quitting: 3.1   • Smokeless tobacco: Never   • Tobacco comments:     MORE OF SOCIAL SMOKER FOR 8 YEARS   Vaping Use   • Vaping Use: Every day   • Substances: Nicotine, Flavoring   • Devices: Disposable   Substance Use Topics   • Alcohol use: Yes     Comment: rarely   • Drug use: Never     No medications prior to admission.     Allergies:  Patient has no known allergies.    Review of Systems   Constitutional: Positive for activity change.   HENT: Negative.    Eyes: Negative.    Respiratory: Positive for chest tightness and shortness of breath.    Cardiovascular: Negative.  Negative for chest pain, palpitations and leg swelling.   Gastrointestinal: Negative for diarrhea, nausea and vomiting.   Endocrine: Negative.    Genitourinary: Negative.    Musculoskeletal: Negative for back pain, gait problem and myalgias.   Skin: Negative for rash and wound.   Allergic/Immunologic: Negative.    Neurological: Negative for dizziness, seizures, syncope, numbness and headaches.   Hematological: Negative.    Psychiatric/Behavioral: Negative.         Objective    Vital Signs  Temp:  [97.6 °F (36.4 °C)] 97.6 °F (36.4 °C)  Heart Rate:  [63] 63  Resp:  [16] 16  BP: (114-116)/(71-72)  114/72  SpO2:  [97 %] 97 %  on   ;   Device (Oxygen Therapy): room air  There is no height or weight on file to calculate BMI.    Physical Exam  Vitals reviewed.   Constitutional:       Appearance: Normal appearance.   HENT:      Head: Normocephalic.      Nose: Nose normal.      Mouth/Throat:      Mouth: Mucous membranes are dry.   Eyes:      Pupils: Pupils are equal, round, and reactive to light.   Cardiovascular:      Rate and Rhythm: Normal rate and regular rhythm.      Heart sounds: Murmur heard.     Friction rub present.   Pulmonary:      Effort: Pulmonary effort is normal.      Breath sounds: Normal breath sounds.   Abdominal:      General: Abdomen is flat.      Palpations: Abdomen is soft.   Musculoskeletal:         General: Normal range of motion.      Cervical back: Normal range of motion and neck supple.      Right lower leg: No edema.      Left lower leg: No edema.   Skin:     General: Skin is warm and dry.      Capillary Refill: Capillary refill takes less than 2 seconds.   Neurological:      General: No focal deficit present.      Mental Status: He is alert and oriented to person, place, and time. Mental status is at baseline.   Psychiatric:         Mood and Affect: Mood normal.         Behavior: Behavior normal.         Thought Content: Thought content normal.         Judgment: Judgment normal.         Results Review:  I reviewed the patient's new clinical results. Lab work/EKG  reviewed. UA negative. Carotid duplex showed normal carotids bilaterally. CXR without evidence of acute processes. COVID-19 screening pending.    WBC WBC   Date Value Ref Range Status   02/15/2023 6.92 3.40 - 10.80 10*3/mm3 Final      HGB Hemoglobin   Date Value Ref Range Status   02/15/2023 15.8 13.0 - 17.7 g/dL Final      HCT Hematocrit   Date Value Ref Range Status   02/15/2023 46.8 37.5 - 51.0 % Final      Platelets Platelets   Date Value Ref Range Status   02/15/2023 145 140 - 450 10*3/mm3 Final        PT/INR:    Protime    Date Value Ref Range Status   02/15/2023 14.2 11.7 - 14.2 Seconds Final   /  INR   Date Value Ref Range Status   02/15/2023 1.08 0.90 - 1.10 Final       Sodium Sodium   Date Value Ref Range Status   02/15/2023 138 136 - 145 mmol/L Final      Potassium Potassium   Date Value Ref Range Status   02/15/2023 4.2 3.5 - 5.2 mmol/L Final      Chloride Chloride   Date Value Ref Range Status   02/15/2023 104 98 - 107 mmol/L Final      Bicarbonate CO2   Date Value Ref Range Status   02/15/2023 28.2 22.0 - 29.0 mmol/L Final      BUN BUN   Date Value Ref Range Status   02/15/2023 11 6 - 20 mg/dL Final      Creatinine Creatinine   Date Value Ref Range Status   02/15/2023 1.01 0.76 - 1.27 mg/dL Final      Calcium Calcium   Date Value Ref Range Status   02/15/2023 9.1 8.6 - 10.5 mg/dL Final      Magnesium Magnesium   Date Value Ref Range Status   02/15/2023 2.0 1.6 - 2.6 mg/dL Final        Assessment & Plan  -Severe mitral valve regurgitation  -Marfan syndrome  -bicuspid aortic valve with mild insufficiency    Plan for mitral valve repair/replacement with Dr. Gann on Friday  Reiterated to patient not to take home medications the morning of surgery  Questions answered with verbalized understanding    GUILLERMINA Yuen  02/15/23  13:02 EST

## 2023-02-17 ENCOUNTER — APPOINTMENT (OUTPATIENT)
Dept: GENERAL RADIOLOGY | Facility: HOSPITAL | Age: 38
DRG: 220 | End: 2023-02-17
Payer: MEDICAID

## 2023-02-17 ENCOUNTER — HOSPITAL ENCOUNTER (INPATIENT)
Facility: HOSPITAL | Age: 38
LOS: 4 days | Discharge: HOME HEALTH | DRG: 220 | End: 2023-02-21
Attending: THORACIC SURGERY (CARDIOTHORACIC VASCULAR SURGERY) | Admitting: THORACIC SURGERY (CARDIOTHORACIC VASCULAR SURGERY)
Payer: MEDICAID

## 2023-02-17 ENCOUNTER — ANCILLARY PROCEDURE (OUTPATIENT)
Dept: PERIOP | Facility: HOSPITAL | Age: 38
DRG: 220 | End: 2023-02-17
Payer: MEDICAID

## 2023-02-17 ENCOUNTER — ANESTHESIA (OUTPATIENT)
Dept: PERIOP | Facility: HOSPITAL | Age: 38
DRG: 220 | End: 2023-02-17
Payer: MEDICAID

## 2023-02-17 DIAGNOSIS — Z98.890 S/P MVR (MITRAL VALVE REPAIR): Primary | ICD-10-CM

## 2023-02-17 DIAGNOSIS — I34.0 MITRAL VALVE INSUFFICIENCY, UNSPECIFIED ETIOLOGY: ICD-10-CM

## 2023-02-17 LAB
25(OH)D3 SERPL-MCNC: 11.3 NG/ML (ref 30–100)
ACT BLD: 113 SECONDS (ref 82–152)
ACT BLD: 131 SECONDS (ref 82–152)
ACT BLD: 335 SECONDS (ref 82–152)
ACT BLD: 432 SECONDS (ref 82–152)
ACT BLD: 462 SECONDS (ref 82–152)
ALBUMIN SERPL-MCNC: 3.6 G/DL (ref 3.5–5.2)
ALBUMIN SERPL-MCNC: 3.9 G/DL (ref 3.5–5.2)
ALBUMIN SERPL-MCNC: 4.1 G/DL (ref 3.5–5.2)
ANION GAP SERPL CALCULATED.3IONS-SCNC: 6.2 MMOL/L (ref 5–15)
ANION GAP SERPL CALCULATED.3IONS-SCNC: 8 MMOL/L (ref 5–15)
ANION GAP SERPL CALCULATED.3IONS-SCNC: 8 MMOL/L (ref 5–15)
APTT PPP: 31.3 SECONDS (ref 22.7–35.4)
APTT PPP: 31.4 SECONDS (ref 22.7–35.4)
ARTERIAL PATENCY WRIST A: ABNORMAL
ATMOSPHERIC PRESS: 752.3 MMHG
ATMOSPHERIC PRESS: 754 MMHG
ATMOSPHERIC PRESS: 754.7 MMHG
BASE EXCESS BLDA CALC-SCNC: -1 MMOL/L (ref -5–5)
BASE EXCESS BLDA CALC-SCNC: -1 MMOL/L (ref -5–5)
BASE EXCESS BLDA CALC-SCNC: -2 MMOL/L (ref -5–5)
BASE EXCESS BLDA CALC-SCNC: -2.4 MMOL/L (ref 0–2)
BASE EXCESS BLDA CALC-SCNC: -3 MMOL/L (ref -5–5)
BASE EXCESS BLDA CALC-SCNC: -3.8 MMOL/L (ref 0–2)
BASE EXCESS BLDA CALC-SCNC: -3.9 MMOL/L (ref 0–2)
BASE EXCESS BLDA CALC-SCNC: 1 MMOL/L (ref -5–5)
BASOPHILS # BLD AUTO: 0.01 10*3/MM3 (ref 0–0.2)
BASOPHILS NFR BLD AUTO: 0.2 % (ref 0–1.5)
BDY SITE: ABNORMAL
BUN SERPL-MCNC: 10 MG/DL (ref 6–20)
BUN SERPL-MCNC: 9 MG/DL (ref 6–20)
BUN SERPL-MCNC: 9 MG/DL (ref 6–20)
BUN/CREAT SERPL: 10.2 (ref 7–25)
BUN/CREAT SERPL: 9.6 (ref 7–25)
BUN/CREAT SERPL: 9.7 (ref 7–25)
CA-I BLD-MCNC: 4.7 MG/DL (ref 4.6–5.4)
CA-I BLD-MCNC: 4.7 MG/DL (ref 4.6–5.4)
CA-I BLDA-SCNC: ABNORMAL MMOL/L
CA-I SERPL ISE-MCNC: 1.17 MMOL/L (ref 1.15–1.35)
CA-I SERPL ISE-MCNC: 1.18 MMOL/L (ref 1.15–1.35)
CALCIUM SPEC-SCNC: 7.9 MG/DL (ref 8.6–10.5)
CALCIUM SPEC-SCNC: 8 MG/DL (ref 8.6–10.5)
CALCIUM SPEC-SCNC: 8.6 MG/DL (ref 8.6–10.5)
CHLORIDE SERPL-SCNC: 106 MMOL/L (ref 98–107)
CHLORIDE SERPL-SCNC: 109 MMOL/L (ref 98–107)
CHLORIDE SERPL-SCNC: 110 MMOL/L (ref 98–107)
CO2 BLDA-SCNC: 25 MMOL/L (ref 24–29)
CO2 BLDA-SCNC: 25 MMOL/L (ref 24–29)
CO2 BLDA-SCNC: 27 MMOL/L (ref 24–29)
CO2 BLDA-SCNC: 30 MMOL/L (ref 24–29)
CO2 BLDA-SCNC: 31 MMOL/L (ref 24–29)
CO2 SERPL-SCNC: 21.8 MMOL/L (ref 22–29)
CO2 SERPL-SCNC: 22 MMOL/L (ref 22–29)
CO2 SERPL-SCNC: 22 MMOL/L (ref 22–29)
CREAT SERPL-MCNC: 0.93 MG/DL (ref 0.76–1.27)
CREAT SERPL-MCNC: 0.94 MG/DL (ref 0.76–1.27)
CREAT SERPL-MCNC: 0.98 MG/DL (ref 0.76–1.27)
DEPRECATED RDW RBC AUTO: 39.4 FL (ref 37–54)
DEPRECATED RDW RBC AUTO: 40 FL (ref 37–54)
DEPRECATED RDW RBC AUTO: 40.6 FL (ref 37–54)
DEPRECATED RDW RBC AUTO: 41.4 FL (ref 37–54)
EGFRCR SERPLBLD CKD-EPI 2021: 100.6 ML/MIN/1.73
EGFRCR SERPLBLD CKD-EPI 2021: 105.8 ML/MIN/1.73
EGFRCR SERPLBLD CKD-EPI 2021: 107.1 ML/MIN/1.73
EOSINOPHIL # BLD AUTO: 0.02 10*3/MM3 (ref 0–0.4)
EOSINOPHIL NFR BLD AUTO: 0.3 % (ref 0.3–6.2)
ERYTHROCYTE [DISTWIDTH] IN BLOOD BY AUTOMATED COUNT: 12.7 % (ref 12.3–15.4)
ERYTHROCYTE [DISTWIDTH] IN BLOOD BY AUTOMATED COUNT: 12.9 % (ref 12.3–15.4)
ERYTHROCYTE [DISTWIDTH] IN BLOOD BY AUTOMATED COUNT: 12.9 % (ref 12.3–15.4)
ERYTHROCYTE [DISTWIDTH] IN BLOOD BY AUTOMATED COUNT: 13 % (ref 12.3–15.4)
FIBRINOGEN PPP-MCNC: 205 MG/DL (ref 219–464)
FIBRINOGEN PPP-MCNC: 337 MG/DL (ref 219–464)
FOLATE SERPL-MCNC: 4.11 NG/ML (ref 4.78–24.2)
GLUCOSE BLDC GLUCOMTR-MCNC: 113 MG/DL (ref 70–130)
GLUCOSE BLDC GLUCOMTR-MCNC: 128 MG/DL (ref 70–130)
GLUCOSE BLDC GLUCOMTR-MCNC: 138 MG/DL (ref 70–130)
GLUCOSE BLDC GLUCOMTR-MCNC: 139 MG/DL (ref 70–130)
GLUCOSE BLDC GLUCOMTR-MCNC: 140 MG/DL (ref 70–130)
GLUCOSE BLDC GLUCOMTR-MCNC: 144 MG/DL (ref 70–130)
GLUCOSE BLDC GLUCOMTR-MCNC: 144 MG/DL (ref 70–130)
GLUCOSE BLDC GLUCOMTR-MCNC: 147 MG/DL (ref 70–130)
GLUCOSE BLDC GLUCOMTR-MCNC: 148 MG/DL (ref 70–130)
GLUCOSE BLDC GLUCOMTR-MCNC: 151 MG/DL (ref 70–130)
GLUCOSE BLDC GLUCOMTR-MCNC: 163 MG/DL (ref 70–130)
GLUCOSE BLDC GLUCOMTR-MCNC: 163 MG/DL (ref 70–130)
GLUCOSE BLDC GLUCOMTR-MCNC: 177 MG/DL (ref 70–130)
GLUCOSE BLDC GLUCOMTR-MCNC: 186 MG/DL (ref 70–130)
GLUCOSE SERPL-MCNC: 147 MG/DL (ref 65–99)
GLUCOSE SERPL-MCNC: 154 MG/DL (ref 65–99)
GLUCOSE SERPL-MCNC: 169 MG/DL (ref 65–99)
HCO3 BLDA-SCNC: 21.9 MMOL/L (ref 22–28)
HCO3 BLDA-SCNC: 22.2 MMOL/L (ref 22–28)
HCO3 BLDA-SCNC: 22.5 MMOL/L (ref 22–28)
HCO3 BLDA-SCNC: 23.2 MMOL/L (ref 22–26)
HCO3 BLDA-SCNC: 23.5 MMOL/L (ref 22–26)
HCO3 BLDA-SCNC: 25.4 MMOL/L (ref 22–26)
HCO3 BLDA-SCNC: 27.3 MMOL/L (ref 22–26)
HCO3 BLDA-SCNC: 28.8 MMOL/L (ref 22–26)
HCT VFR BLD AUTO: 33.8 % (ref 37.5–51)
HCT VFR BLD AUTO: 36.3 % (ref 37.5–51)
HCT VFR BLD AUTO: 36.5 % (ref 37.5–51)
HCT VFR BLD AUTO: 37.1 % (ref 37.5–51)
HCT VFR BLDA CALC: 34 % (ref 38–51)
HCT VFR BLDA CALC: 35 % (ref 38–51)
HCT VFR BLDA CALC: 36 % (ref 38–51)
HCT VFR BLDA CALC: 38 % (ref 38–51)
HCT VFR BLDA CALC: 39 % (ref 38–51)
HGB BLD-MCNC: 11.8 G/DL (ref 13–17.7)
HGB BLD-MCNC: 12.4 G/DL (ref 13–17.7)
HGB BLD-MCNC: 12.5 G/DL (ref 13–17.7)
HGB BLD-MCNC: 12.8 G/DL (ref 13–17.7)
HGB BLDA-MCNC: 11.6 G/DL (ref 12–17)
HGB BLDA-MCNC: 11.9 G/DL (ref 12–17)
HGB BLDA-MCNC: 12.2 G/DL (ref 12–17)
HGB BLDA-MCNC: 12.9 G/DL (ref 12–17)
HGB BLDA-MCNC: 13.3 G/DL (ref 12–17)
IGA1 MFR SER: 215 MG/DL (ref 70–400)
IGG1 SER-MCNC: 569 MG/DL (ref 700–1600)
IGM SERPL-MCNC: 166 MG/DL (ref 40–230)
INHALED O2 CONCENTRATION: 100 %
INHALED O2 CONCENTRATION: 40 %
INR PPP: 1.41 (ref 0.9–1.1)
INR PPP: 1.5 (ref 0.8–1.2)
INR PPP: 1.55 (ref 0.9–1.1)
LYMPHOCYTES # BLD AUTO: 0.25 10*3/MM3 (ref 0.7–3.1)
LYMPHOCYTES NFR BLD AUTO: 4.4 % (ref 19.6–45.3)
MAGNESIUM SERPL-MCNC: 2 MG/DL (ref 1.6–2.6)
MAGNESIUM SERPL-MCNC: 2.5 MG/DL (ref 1.6–2.6)
MAGNESIUM SERPL-MCNC: 3 MG/DL (ref 1.6–2.6)
MCH RBC QN AUTO: 29.3 PG (ref 26.6–33)
MCH RBC QN AUTO: 30 PG (ref 26.6–33)
MCH RBC QN AUTO: 30.3 PG (ref 26.6–33)
MCH RBC QN AUTO: 30.4 PG (ref 26.6–33)
MCHC RBC AUTO-ENTMCNC: 33.7 G/DL (ref 31.5–35.7)
MCHC RBC AUTO-ENTMCNC: 34 G/DL (ref 31.5–35.7)
MCHC RBC AUTO-ENTMCNC: 34.9 G/DL (ref 31.5–35.7)
MCHC RBC AUTO-ENTMCNC: 35.3 G/DL (ref 31.5–35.7)
MCV RBC AUTO: 86.2 FL (ref 79–97)
MCV RBC AUTO: 86.7 FL (ref 79–97)
MCV RBC AUTO: 86.9 FL (ref 79–97)
MCV RBC AUTO: 88.4 FL (ref 79–97)
MODALITY: ABNORMAL
MONOCYTES # BLD AUTO: 0.05 10*3/MM3 (ref 0.1–0.9)
MONOCYTES NFR BLD AUTO: 0.9 % (ref 5–12)
NEUTROPHILS NFR BLD AUTO: 5.32 10*3/MM3 (ref 1.7–7)
NEUTROPHILS NFR BLD AUTO: 93 % (ref 42.7–76)
NRBC BLD AUTO-RTO: 0 /100 WBC (ref 0–0.2)
O2 A-A PPRESDIFF RESPIRATORY: 0.5 MMHG
O2 A-A PPRESDIFF RESPIRATORY: 0.8 MMHG
PCO2 BLDA: 37.5 MM HG (ref 35–45)
PCO2 BLDA: 38.4 MM HG (ref 35–45)
PCO2 BLDA: 41.4 MM HG (ref 35–45)
PCO2 BLDA: 43.6 MM HG (ref 35–45)
PCO2 BLDA: 46.5 MM HG (ref 35–45)
PCO2 BLDA: 63.5 MM HG (ref 35–45)
PCO2 BLDA: 70.1 MM HG (ref 35–45)
PCO2 BLDA: 76.5 MM HG (ref 35–45)
PEEP RESPIRATORY: 5 CM[H2O]
PH BLDA: 7.16 PH UNITS (ref 7.35–7.6)
PH BLDA: 7.22 PH UNITS (ref 7.35–7.6)
PH BLDA: 7.24 PH UNITS (ref 7.35–7.6)
PH BLDA: 7.32 PH UNITS (ref 7.35–7.45)
PH BLDA: 7.32 PH UNITS (ref 7.35–7.6)
PH BLDA: 7.33 PH UNITS (ref 7.35–7.45)
PH BLDA: 7.38 PH UNITS (ref 7.35–7.45)
PH BLDA: 7.42 PH UNITS (ref 7.35–7.6)
PHOSPHATE SERPL-MCNC: 1 MG/DL (ref 2.5–4.5)
PHOSPHATE SERPL-MCNC: 1.5 MG/DL (ref 2.5–4.5)
PHOSPHATE SERPL-MCNC: 3.2 MG/DL (ref 2.5–4.5)
PLATELET # BLD AUTO: 122 10*3/MM3 (ref 140–450)
PLATELET # BLD AUTO: 125 10*3/MM3 (ref 140–450)
PLATELET # BLD AUTO: 125 10*3/MM3 (ref 140–450)
PLATELET # BLD AUTO: 140 10*3/MM3 (ref 140–450)
PLATELET # BLD AUTO: 89 10*3/MM3 (ref 140–450)
PLATELETS.RETICULATED NFR BLD AUTO: 10.1 % (ref 0.9–6.5)
PMV BLD AUTO: 11.2 FL (ref 6–12)
PMV BLD AUTO: 11.5 FL (ref 6–12)
PMV BLD AUTO: 12 FL (ref 6–12)
PMV BLD AUTO: 12.1 FL (ref 6–12)
PO2 BLDA: 118.5 MM HG (ref 80–100)
PO2 BLDA: 135.8 MM HG (ref 80–100)
PO2 BLDA: 253 MMHG (ref 80–105)
PO2 BLDA: 438 MMHG (ref 80–105)
PO2 BLDA: 486 MMHG (ref 80–105)
PO2 BLDA: 533 MMHG (ref 80–105)
PO2 BLDA: 554.5 MM HG (ref 80–100)
PO2 BLDA: 56 MMHG (ref 80–105)
POTASSIUM BLDA-SCNC: 3.7 MMOL/L (ref 3.5–4.9)
POTASSIUM BLDA-SCNC: 4.3 MMOL/L (ref 3.5–4.9)
POTASSIUM BLDA-SCNC: 4.8 MMOL/L (ref 3.5–4.9)
POTASSIUM BLDA-SCNC: 5.2 MMOL/L (ref 3.5–4.9)
POTASSIUM BLDA-SCNC: 5.4 MMOL/L (ref 3.5–4.9)
POTASSIUM SERPL-SCNC: 3.8 MMOL/L (ref 3.5–5.2)
POTASSIUM SERPL-SCNC: 4.2 MMOL/L (ref 3.5–5.2)
POTASSIUM SERPL-SCNC: 5.2 MMOL/L (ref 3.5–5.2)
PROTHROMBIN TIME: 17.4 SECONDS (ref 11.7–14.2)
PROTHROMBIN TIME: 17.9 SECONDS (ref 12.8–15.2)
PROTHROMBIN TIME: 18.8 SECONDS (ref 11.7–14.2)
PSV: 8 CMH2O
PTH-INTACT SERPL-MCNC: 78.3 PG/ML (ref 15–65)
QT INTERVAL: 397 MS
RBC # BLD AUTO: 3.9 10*6/MM3 (ref 4.14–5.8)
RBC # BLD AUTO: 4.13 10*6/MM3 (ref 4.14–5.8)
RBC # BLD AUTO: 4.21 10*6/MM3 (ref 4.14–5.8)
RBC # BLD AUTO: 4.27 10*6/MM3 (ref 4.14–5.8)
SAO2 % BLDA: 100 % (ref 95–98)
SAO2 % BLDA: 78 % (ref 95–98)
SAO2 % BLDCOA: 100 % (ref 92–99)
SAO2 % BLDCOA: 98.3 % (ref 92–99)
SAO2 % BLDCOA: 98.8 % (ref 92–99)
SET MECH RESP RATE: 12
SODIUM SERPL-SCNC: 134 MMOL/L (ref 136–145)
SODIUM SERPL-SCNC: 139 MMOL/L (ref 136–145)
SODIUM SERPL-SCNC: 140 MMOL/L (ref 136–145)
TOTAL RATE: 12 BREATHS/MINUTE
TOTAL RATE: 15 BREATHS/MINUTE
VENTILATOR MODE: ABNORMAL
VIT B12 BLD-MCNC: 342 PG/ML (ref 211–946)
VT ON VENT VENT: 700 ML
VT ON VENT VENT: 800 ML
VT ON VENT VENT: 900 ML
WBC NRBC COR # BLD: 19.24 10*3/MM3 (ref 3.4–10.8)
WBC NRBC COR # BLD: 27.78 10*3/MM3 (ref 3.4–10.8)
WBC NRBC COR # BLD: 3.45 10*3/MM3 (ref 3.4–10.8)
WBC NRBC COR # BLD: 5.72 10*3/MM3 (ref 3.4–10.8)

## 2023-02-17 PROCEDURE — 0 METHADONE PER 10 MG: Performed by: ANESTHESIOLOGY

## 2023-02-17 PROCEDURE — 82330 ASSAY OF CALCIUM: CPT | Performed by: NURSE PRACTITIONER

## 2023-02-17 PROCEDURE — 02QG0ZZ REPAIR MITRAL VALVE, OPEN APPROACH: ICD-10-PCS | Performed by: THORACIC SURGERY (CARDIOTHORACIC VASCULAR SURGERY)

## 2023-02-17 PROCEDURE — 85730 THROMBOPLASTIN TIME PARTIAL: CPT | Performed by: NURSE PRACTITIONER

## 2023-02-17 PROCEDURE — 86927 PLASMA FRESH FROZEN: CPT

## 2023-02-17 PROCEDURE — 85610 PROTHROMBIN TIME: CPT | Performed by: THORACIC SURGERY (CARDIOTHORACIC VASCULAR SURGERY)

## 2023-02-17 PROCEDURE — 25010000002 AMIODARONE IN DEXTROSE 5% 360-4.14 MG/200ML-% SOLUTION: Performed by: PHYSICIAN ASSISTANT

## 2023-02-17 PROCEDURE — 25010000002 METOCLOPRAMIDE PER 10 MG: Performed by: NURSE PRACTITIONER

## 2023-02-17 PROCEDURE — 85055 RETICULATED PLATELET ASSAY: CPT | Performed by: INTERNAL MEDICINE

## 2023-02-17 PROCEDURE — 82947 ASSAY GLUCOSE BLOOD QUANT: CPT

## 2023-02-17 PROCEDURE — 82803 BLOOD GASES ANY COMBINATION: CPT

## 2023-02-17 PROCEDURE — 25010000002 HEPARIN (PORCINE) PER 1000 UNITS

## 2023-02-17 PROCEDURE — 25010000002 CEFAZOLIN IN DEXTROSE 2-4 GM/100ML-% SOLUTION: Performed by: NURSE PRACTITIONER

## 2023-02-17 PROCEDURE — 25010000002 MORPHINE PER 10 MG: Performed by: NURSE PRACTITIONER

## 2023-02-17 PROCEDURE — 83970 ASSAY OF PARATHORMONE: CPT | Performed by: INTERNAL MEDICINE

## 2023-02-17 PROCEDURE — C1729 CATH, DRAINAGE: HCPCS | Performed by: THORACIC SURGERY (CARDIOTHORACIC VASCULAR SURGERY)

## 2023-02-17 PROCEDURE — 25010000002 LIDOCAINE PER 10 MG: Performed by: NURSE PRACTITIONER

## 2023-02-17 PROCEDURE — 71045 X-RAY EXAM CHEST 1 VIEW: CPT

## 2023-02-17 PROCEDURE — 25010000002 AMIODARONE IN DEXTROSE 5% 150-4.21 MG/100ML-% SOLUTION: Performed by: NURSE PRACTITIONER

## 2023-02-17 PROCEDURE — 85610 PROTHROMBIN TIME: CPT

## 2023-02-17 PROCEDURE — 85610 PROTHROMBIN TIME: CPT | Performed by: NURSE PRACTITIONER

## 2023-02-17 PROCEDURE — 93005 ELECTROCARDIOGRAM TRACING: CPT | Performed by: NURSE PRACTITIONER

## 2023-02-17 PROCEDURE — 33641 REPAIR HEART SEPTUM DEFECT: CPT

## 2023-02-17 PROCEDURE — P9041 ALBUMIN (HUMAN),5%, 50ML: HCPCS | Performed by: NURSE PRACTITIONER

## 2023-02-17 PROCEDURE — 83735 ASSAY OF MAGNESIUM: CPT | Performed by: NURSE PRACTITIONER

## 2023-02-17 PROCEDURE — P9035 PLATELET PHERES LEUKOREDUCED: HCPCS

## 2023-02-17 PROCEDURE — P9100 PATHOGEN TEST FOR PLATELETS: HCPCS

## 2023-02-17 PROCEDURE — 0 POTASSIUM CHLORIDE PER 2 MEQ: Performed by: NURSE PRACTITIONER

## 2023-02-17 PROCEDURE — 5A1221Z PERFORMANCE OF CARDIAC OUTPUT, CONTINUOUS: ICD-10-PCS | Performed by: THORACIC SURGERY (CARDIOTHORACIC VASCULAR SURGERY)

## 2023-02-17 PROCEDURE — 85014 HEMATOCRIT: CPT

## 2023-02-17 PROCEDURE — 82962 GLUCOSE BLOOD TEST: CPT

## 2023-02-17 PROCEDURE — 25010000002 PHENYLEPHRINE 10 MG/ML SOLUTION: Performed by: ANESTHESIOLOGY

## 2023-02-17 PROCEDURE — P9047 ALBUMIN (HUMAN), 25%, 50ML: HCPCS

## 2023-02-17 PROCEDURE — 33427 REPAIR OF MITRAL VALVE: CPT | Performed by: THORACIC SURGERY (CARDIOTHORACIC VASCULAR SURGERY)

## 2023-02-17 PROCEDURE — 85027 COMPLETE CBC AUTOMATED: CPT | Performed by: NURSE PRACTITIONER

## 2023-02-17 PROCEDURE — 25010000002 HEPARIN (PORCINE) PER 1000 UNITS: Performed by: ANESTHESIOLOGY

## 2023-02-17 PROCEDURE — 25010000002 MAGNESIUM SULFATE PER 500 MG OF MAGNESIUM: Performed by: ANESTHESIOLOGY

## 2023-02-17 PROCEDURE — 25010000002 MAGNESIUM SULFATE 2 GM/50ML SOLUTION: Performed by: NURSE PRACTITIONER

## 2023-02-17 PROCEDURE — 82784 ASSAY IGA/IGD/IGG/IGM EACH: CPT | Performed by: INTERNAL MEDICINE

## 2023-02-17 PROCEDURE — C1713 ANCHOR/SCREW BN/BN,TIS/BN: HCPCS | Performed by: THORACIC SURGERY (CARDIOTHORACIC VASCULAR SURGERY)

## 2023-02-17 PROCEDURE — 93318 ECHO TRANSESOPHAGEAL INTRAOP: CPT | Performed by: ANESTHESIOLOGY

## 2023-02-17 PROCEDURE — 25010000002 PROPOFOL 10 MG/ML EMULSION: Performed by: ANESTHESIOLOGY

## 2023-02-17 PROCEDURE — 85384 FIBRINOGEN ACTIVITY: CPT | Performed by: NURSE PRACTITIONER

## 2023-02-17 PROCEDURE — 85025 COMPLETE CBC W/AUTO DIFF WBC: CPT | Performed by: NURSE PRACTITIONER

## 2023-02-17 PROCEDURE — C1889 IMPLANT/INSERT DEVICE, NOC: HCPCS | Performed by: THORACIC SURGERY (CARDIOTHORACIC VASCULAR SURGERY)

## 2023-02-17 PROCEDURE — 85730 THROMBOPLASTIN TIME PARTIAL: CPT | Performed by: THORACIC SURGERY (CARDIOTHORACIC VASCULAR SURGERY)

## 2023-02-17 PROCEDURE — 85384 FIBRINOGEN ACTIVITY: CPT | Performed by: THORACIC SURGERY (CARDIOTHORACIC VASCULAR SURGERY)

## 2023-02-17 PROCEDURE — 82306 VITAMIN D 25 HYDROXY: CPT | Performed by: INTERNAL MEDICINE

## 2023-02-17 PROCEDURE — 94799 UNLISTED PULMONARY SVC/PX: CPT

## 2023-02-17 PROCEDURE — 85347 COAGULATION TIME ACTIVATED: CPT

## 2023-02-17 PROCEDURE — 33427 REPAIR OF MITRAL VALVE: CPT

## 2023-02-17 PROCEDURE — 02Q50ZZ REPAIR ATRIAL SEPTUM, OPEN APPROACH: ICD-10-PCS | Performed by: THORACIC SURGERY (CARDIOTHORACIC VASCULAR SURGERY)

## 2023-02-17 PROCEDURE — 25010000002 VANCOMYCIN 1 G RECONSTITUTED SOLUTION

## 2023-02-17 PROCEDURE — P9012 CRYOPRECIPITATE EACH UNIT: HCPCS

## 2023-02-17 PROCEDURE — 25010000002 ALBUMIN HUMAN 25% PER 50 ML

## 2023-02-17 PROCEDURE — 25010000002 PROTAMINE SULFATE PER 10 MG: Performed by: ANESTHESIOLOGY

## 2023-02-17 PROCEDURE — 36430 TRANSFUSION BLD/BLD COMPNT: CPT

## 2023-02-17 PROCEDURE — 85007 BL SMEAR W/DIFF WBC COUNT: CPT | Performed by: NURSE PRACTITIONER

## 2023-02-17 PROCEDURE — 25010000002 CYANOCOBALAMIN PER 1000 MCG: Performed by: INTERNAL MEDICINE

## 2023-02-17 PROCEDURE — 25010000002 MIDAZOLAM PER 1 MG: Performed by: ANESTHESIOLOGY

## 2023-02-17 PROCEDURE — 33641 REPAIR HEART SEPTUM DEFECT: CPT | Performed by: THORACIC SURGERY (CARDIOTHORACIC VASCULAR SURGERY)

## 2023-02-17 PROCEDURE — 25010000002 ALBUMIN HUMAN 5% PER 50 ML: Performed by: NURSE PRACTITIONER

## 2023-02-17 PROCEDURE — 82607 VITAMIN B-12: CPT | Performed by: INTERNAL MEDICINE

## 2023-02-17 PROCEDURE — 99223 1ST HOSP IP/OBS HIGH 75: CPT | Performed by: INTERNAL MEDICINE

## 2023-02-17 PROCEDURE — 99222 1ST HOSP IP/OBS MODERATE 55: CPT | Performed by: INTERNAL MEDICINE

## 2023-02-17 PROCEDURE — 82746 ASSAY OF FOLIC ACID SERUM: CPT | Performed by: INTERNAL MEDICINE

## 2023-02-17 PROCEDURE — 94002 VENT MGMT INPAT INIT DAY: CPT

## 2023-02-17 PROCEDURE — 25010000002 METHYLPREDNISOLONE PER 125 MG: Performed by: ANESTHESIOLOGY

## 2023-02-17 PROCEDURE — 85027 COMPLETE CBC AUTOMATED: CPT | Performed by: THORACIC SURGERY (CARDIOTHORACIC VASCULAR SURGERY)

## 2023-02-17 PROCEDURE — C1751 CATH, INF, PER/CENT/MIDLINE: HCPCS | Performed by: ANESTHESIOLOGY

## 2023-02-17 PROCEDURE — 25010000002 AMIODARONE PER 30 MG: Performed by: ANESTHESIOLOGY

## 2023-02-17 PROCEDURE — 25010000002 CALCIUM GLUCONATE-NACL 1-0.675 GM/50ML-% SOLUTION: Performed by: NURSE PRACTITIONER

## 2023-02-17 PROCEDURE — 85018 HEMOGLOBIN: CPT

## 2023-02-17 PROCEDURE — 80069 RENAL FUNCTION PANEL: CPT | Performed by: NURSE PRACTITIONER

## 2023-02-17 DEVICE — SS SUTURE, 4 PER SLEEVE
Type: IMPLANTABLE DEVICE | Site: STERNUM | Status: FUNCTIONAL
Brand: MYO/WIRE II

## 2023-02-17 DEVICE — SS SUTURE, 3 PER SLEEVE
Type: IMPLANTABLE DEVICE | Site: STERNUM | Status: FUNCTIONAL
Brand: MYO/WIRE II

## 2023-02-17 DEVICE — IMPLANTABLE DEVICE: Type: IMPLANTABLE DEVICE | Site: HEART | Status: FUNCTIONAL

## 2023-02-17 DEVICE — COR-KNOT MINI® COMBO KITBASE PACKAGE TYPE - KITEACH STERILE PACKAGE KIT CONTAINS (2) SINGLE PATIENT USE COR-KNOT MINI® DEVICES AND (12) COR-KNOT® QUICK LOADS®.
Type: IMPLANTABLE DEVICE | Site: HEART | Status: FUNCTIONAL
Brand: COR-KNOT MINI®

## 2023-02-17 RX ORDER — CYCLOBENZAPRINE HCL 10 MG
10 TABLET ORAL EVERY 8 HOURS PRN
Status: DISCONTINUED | OUTPATIENT
Start: 2023-02-18 | End: 2023-02-21 | Stop reason: HOSPADM

## 2023-02-17 RX ORDER — ALPRAZOLAM 0.25 MG/1
0.25 TABLET ORAL EVERY 8 HOURS PRN
Status: DISCONTINUED | OUTPATIENT
Start: 2023-02-17 | End: 2023-02-21 | Stop reason: HOSPADM

## 2023-02-17 RX ORDER — NICOTINE POLACRILEX 4 MG
15 LOZENGE BUCCAL
Status: DISCONTINUED | OUTPATIENT
Start: 2023-02-17 | End: 2023-02-18

## 2023-02-17 RX ORDER — SODIUM CHLORIDE 0.9 % (FLUSH) 0.9 %
10 SYRINGE (ML) INJECTION EVERY 12 HOURS SCHEDULED
Status: DISCONTINUED | OUTPATIENT
Start: 2023-02-17 | End: 2023-02-17 | Stop reason: HOSPADM

## 2023-02-17 RX ORDER — MAGNESIUM SULFATE HEPTAHYDRATE 40 MG/ML
2 INJECTION, SOLUTION INTRAVENOUS ONCE
Status: COMPLETED | OUTPATIENT
Start: 2023-02-17 | End: 2023-02-17

## 2023-02-17 RX ORDER — PHENYLEPHRINE HCL IN 0.9% NACL 0.5 MG/5ML
.2-2 SYRINGE (ML) INTRAVENOUS CONTINUOUS PRN
Status: DISCONTINUED | OUTPATIENT
Start: 2023-02-17 | End: 2023-02-19

## 2023-02-17 RX ORDER — SODIUM CHLORIDE, SODIUM LACTATE, POTASSIUM CHLORIDE, CALCIUM CHLORIDE 600; 310; 30; 20 MG/100ML; MG/100ML; MG/100ML; MG/100ML
9 INJECTION, SOLUTION INTRAVENOUS CONTINUOUS PRN
Status: DISCONTINUED | OUTPATIENT
Start: 2023-02-17 | End: 2023-02-17

## 2023-02-17 RX ORDER — ALBUMIN, HUMAN INJ 5% 5 %
250 SOLUTION INTRAVENOUS ONCE
Status: COMPLETED | OUTPATIENT
Start: 2023-02-17 | End: 2023-02-18

## 2023-02-17 RX ORDER — CHLORHEXIDINE GLUCONATE 500 MG/1
1 CLOTH TOPICAL EVERY 12 HOURS PRN
Status: DISCONTINUED | OUTPATIENT
Start: 2023-02-17 | End: 2023-02-17 | Stop reason: HOSPADM

## 2023-02-17 RX ORDER — HYDROCODONE BITARTRATE AND ACETAMINOPHEN 5; 325 MG/1; MG/1
2 TABLET ORAL EVERY 4 HOURS PRN
Status: DISCONTINUED | OUTPATIENT
Start: 2023-02-17 | End: 2023-02-21 | Stop reason: HOSPADM

## 2023-02-17 RX ORDER — POTASSIUM CHLORIDE 750 MG/1
40 TABLET, FILM COATED, EXTENDED RELEASE ORAL AS NEEDED
Status: DISCONTINUED | OUTPATIENT
Start: 2023-02-17 | End: 2023-02-21 | Stop reason: HOSPADM

## 2023-02-17 RX ORDER — POTASSIUM CHLORIDE 29.8 MG/ML
20 INJECTION INTRAVENOUS
Status: DISCONTINUED | OUTPATIENT
Start: 2023-02-17 | End: 2023-02-21 | Stop reason: HOSPADM

## 2023-02-17 RX ORDER — MORPHINE SULFATE 2 MG/ML
4 INJECTION, SOLUTION INTRAMUSCULAR; INTRAVENOUS
Status: DISCONTINUED | OUTPATIENT
Start: 2023-02-17 | End: 2023-02-19

## 2023-02-17 RX ORDER — OXYCODONE HYDROCHLORIDE 5 MG/1
10 TABLET ORAL EVERY 4 HOURS PRN
Status: DISCONTINUED | OUTPATIENT
Start: 2023-02-17 | End: 2023-02-19

## 2023-02-17 RX ORDER — BISACODYL 5 MG/1
10 TABLET, DELAYED RELEASE ORAL DAILY PRN
Status: DISCONTINUED | OUTPATIENT
Start: 2023-02-17 | End: 2023-02-21 | Stop reason: HOSPADM

## 2023-02-17 RX ORDER — LIDOCAINE HYDROCHLORIDE ANHYDROUS AND DEXTROSE MONOHYDRATE 5; 400 G/100ML; MG/100ML
2 INJECTION, SOLUTION INTRAVENOUS CONTINUOUS
Status: DISCONTINUED | OUTPATIENT
Start: 2023-02-17 | End: 2023-02-18

## 2023-02-17 RX ORDER — PROTAMINE SULFATE 10 MG/ML
INJECTION, SOLUTION INTRAVENOUS AS NEEDED
Status: DISCONTINUED | OUTPATIENT
Start: 2023-02-17 | End: 2023-02-17 | Stop reason: SURG

## 2023-02-17 RX ORDER — DOPAMINE HYDROCHLORIDE 160 MG/100ML
2-20 INJECTION, SOLUTION INTRAVENOUS CONTINUOUS PRN
Status: DISCONTINUED | OUTPATIENT
Start: 2023-02-17 | End: 2023-02-19

## 2023-02-17 RX ORDER — AMINOCAPROIC ACID 250 MG/ML
INJECTION, SOLUTION INTRAVENOUS AS NEEDED
Status: DISCONTINUED | OUTPATIENT
Start: 2023-02-17 | End: 2023-02-17 | Stop reason: SURG

## 2023-02-17 RX ORDER — METOCLOPRAMIDE HYDROCHLORIDE 5 MG/ML
10 INJECTION INTRAMUSCULAR; INTRAVENOUS EVERY 6 HOURS
Status: DISPENSED | OUTPATIENT
Start: 2023-02-17 | End: 2023-02-18

## 2023-02-17 RX ORDER — MEPERIDINE HYDROCHLORIDE 25 MG/ML
25 INJECTION INTRAMUSCULAR; INTRAVENOUS; SUBCUTANEOUS EVERY 4 HOURS PRN
Status: ACTIVE | OUTPATIENT
Start: 2023-02-17 | End: 2023-02-17

## 2023-02-17 RX ORDER — MILRINONE LACTATE 0.2 MG/ML
.25-.375 INJECTION, SOLUTION INTRAVENOUS CONTINUOUS PRN
Status: DISCONTINUED | OUTPATIENT
Start: 2023-02-17 | End: 2023-02-19

## 2023-02-17 RX ORDER — PROPOFOL 10 MG/ML
VIAL (ML) INTRAVENOUS AS NEEDED
Status: DISCONTINUED | OUTPATIENT
Start: 2023-02-17 | End: 2023-02-17 | Stop reason: SURG

## 2023-02-17 RX ORDER — CHLORHEXIDINE GLUCONATE 0.12 MG/ML
15 RINSE ORAL EVERY 12 HOURS
Status: DISCONTINUED | OUTPATIENT
Start: 2023-02-17 | End: 2023-02-20

## 2023-02-17 RX ORDER — CALCIUM GLUCONATE 20 MG/ML
2 INJECTION, SOLUTION INTRAVENOUS ONCE
Status: COMPLETED | OUTPATIENT
Start: 2023-02-17 | End: 2023-02-17

## 2023-02-17 RX ORDER — HEPARIN SODIUM 1000 [USP'U]/ML
INJECTION, SOLUTION INTRAVENOUS; SUBCUTANEOUS AS NEEDED
Status: DISCONTINUED | OUTPATIENT
Start: 2023-02-17 | End: 2023-02-17 | Stop reason: SURG

## 2023-02-17 RX ORDER — CEFAZOLIN SODIUM 2 G/100ML
2 INJECTION, SOLUTION INTRAVENOUS ONCE
Status: COMPLETED | OUTPATIENT
Start: 2023-02-17 | End: 2023-02-17

## 2023-02-17 RX ORDER — ENOXAPARIN SODIUM 100 MG/ML
40 INJECTION SUBCUTANEOUS DAILY
Status: DISCONTINUED | OUTPATIENT
Start: 2023-02-18 | End: 2023-02-19

## 2023-02-17 RX ORDER — SODIUM CHLORIDE 9 MG/ML
30 INJECTION, SOLUTION INTRAVENOUS CONTINUOUS
Status: DISCONTINUED | OUTPATIENT
Start: 2023-02-17 | End: 2023-02-21 | Stop reason: HOSPADM

## 2023-02-17 RX ORDER — ONDANSETRON 2 MG/ML
4 INJECTION INTRAMUSCULAR; INTRAVENOUS EVERY 6 HOURS PRN
Status: DISCONTINUED | OUTPATIENT
Start: 2023-02-17 | End: 2023-02-21 | Stop reason: HOSPADM

## 2023-02-17 RX ORDER — FAMOTIDINE 10 MG/ML
20 INJECTION, SOLUTION INTRAVENOUS
Status: COMPLETED | OUTPATIENT
Start: 2023-02-17 | End: 2023-02-17

## 2023-02-17 RX ORDER — ASPIRIN 81 MG/1
81 TABLET ORAL DAILY
Status: DISCONTINUED | OUTPATIENT
Start: 2023-02-18 | End: 2023-02-21 | Stop reason: HOSPADM

## 2023-02-17 RX ORDER — NITROGLYCERIN 20 MG/100ML
INJECTION INTRAVENOUS AS NEEDED
Status: DISCONTINUED | OUTPATIENT
Start: 2023-02-17 | End: 2023-02-17 | Stop reason: SURG

## 2023-02-17 RX ORDER — ACETAMINOPHEN 160 MG/5ML
650 SOLUTION ORAL EVERY 4 HOURS
Status: ACTIVE | OUTPATIENT
Start: 2023-02-17 | End: 2023-02-18

## 2023-02-17 RX ORDER — ALBUMIN, HUMAN INJ 5% 5 %
1000 SOLUTION INTRAVENOUS AS NEEDED
Status: DISPENSED | OUTPATIENT
Start: 2023-02-17 | End: 2023-02-18

## 2023-02-17 RX ORDER — PHENYLEPHRINE HYDROCHLORIDE 10 MG/ML
INJECTION INTRAVENOUS AS NEEDED
Status: DISCONTINUED | OUTPATIENT
Start: 2023-02-17 | End: 2023-02-17 | Stop reason: SURG

## 2023-02-17 RX ORDER — PANTOPRAZOLE SODIUM 40 MG/10ML
40 INJECTION, POWDER, LYOPHILIZED, FOR SOLUTION INTRAVENOUS ONCE
Status: DISCONTINUED | OUTPATIENT
Start: 2023-02-17 | End: 2023-02-21 | Stop reason: HOSPADM

## 2023-02-17 RX ORDER — SODIUM CHLORIDE 0.9 % (FLUSH) 0.9 %
10 SYRINGE (ML) INJECTION AS NEEDED
Status: DISCONTINUED | OUTPATIENT
Start: 2023-02-17 | End: 2023-02-17 | Stop reason: HOSPADM

## 2023-02-17 RX ORDER — METHADONE HYDROCHLORIDE 10 MG/ML
INJECTION, SOLUTION INTRAMUSCULAR; INTRAVENOUS; SUBCUTANEOUS AS NEEDED
Status: DISCONTINUED | OUTPATIENT
Start: 2023-02-17 | End: 2023-02-17 | Stop reason: SURG

## 2023-02-17 RX ORDER — ROCURONIUM BROMIDE 10 MG/ML
INJECTION, SOLUTION INTRAVENOUS AS NEEDED
Status: DISCONTINUED | OUTPATIENT
Start: 2023-02-17 | End: 2023-02-17 | Stop reason: SURG

## 2023-02-17 RX ORDER — DEXMEDETOMIDINE HYDROCHLORIDE 4 UG/ML
.2-1.5 INJECTION, SOLUTION INTRAVENOUS
Status: DISCONTINUED | OUTPATIENT
Start: 2023-02-17 | End: 2023-02-19

## 2023-02-17 RX ORDER — MAGNESIUM SULFATE HEPTAHYDRATE 500 MG/ML
INJECTION, SOLUTION INTRAMUSCULAR; INTRAVENOUS AS NEEDED
Status: DISCONTINUED | OUTPATIENT
Start: 2023-02-17 | End: 2023-02-17 | Stop reason: SURG

## 2023-02-17 RX ORDER — ACETAMINOPHEN 10 MG/ML
1000 INJECTION, SOLUTION INTRAVENOUS EVERY 8 HOURS
Status: COMPLETED | OUTPATIENT
Start: 2023-02-17 | End: 2023-02-18

## 2023-02-17 RX ORDER — BISACODYL 10 MG
10 SUPPOSITORY, RECTAL RECTAL DAILY PRN
Status: DISCONTINUED | OUTPATIENT
Start: 2023-02-18 | End: 2023-02-21 | Stop reason: HOSPADM

## 2023-02-17 RX ORDER — ACETAMINOPHEN 325 MG/1
650 TABLET ORAL EVERY 4 HOURS
Status: ACTIVE | OUTPATIENT
Start: 2023-02-17 | End: 2023-02-18

## 2023-02-17 RX ORDER — CYANOCOBALAMIN 1000 UG/ML
1000 INJECTION, SOLUTION INTRAMUSCULAR; SUBCUTANEOUS ONCE
Status: COMPLETED | OUTPATIENT
Start: 2023-02-17 | End: 2023-02-17

## 2023-02-17 RX ORDER — FENTANYL CITRATE 50 UG/ML
50 INJECTION, SOLUTION INTRAMUSCULAR; INTRAVENOUS
Status: DISCONTINUED | OUTPATIENT
Start: 2023-02-17 | End: 2023-02-17 | Stop reason: HOSPADM

## 2023-02-17 RX ORDER — ACETAMINOPHEN 160 MG/5ML
650 SOLUTION ORAL EVERY 4 HOURS PRN
Status: DISCONTINUED | OUTPATIENT
Start: 2023-02-18 | End: 2023-02-21 | Stop reason: HOSPADM

## 2023-02-17 RX ORDER — METHYLPREDNISOLONE SODIUM SUCCINATE 125 MG/2ML
INJECTION, POWDER, LYOPHILIZED, FOR SOLUTION INTRAMUSCULAR; INTRAVENOUS AS NEEDED
Status: DISCONTINUED | OUTPATIENT
Start: 2023-02-17 | End: 2023-02-17 | Stop reason: SURG

## 2023-02-17 RX ORDER — NITROGLYCERIN 20 MG/100ML
5-200 INJECTION INTRAVENOUS
Status: DISCONTINUED | OUTPATIENT
Start: 2023-02-17 | End: 2023-02-19

## 2023-02-17 RX ORDER — POLYETHYLENE GLYCOL 3350 17 G/17G
17 POWDER, FOR SOLUTION ORAL DAILY PRN
Status: DISCONTINUED | OUTPATIENT
Start: 2023-02-17 | End: 2023-02-21 | Stop reason: HOSPADM

## 2023-02-17 RX ORDER — MIDAZOLAM HYDROCHLORIDE 1 MG/ML
2 INJECTION INTRAMUSCULAR; INTRAVENOUS
Status: DISCONTINUED | OUTPATIENT
Start: 2023-02-17 | End: 2023-02-19

## 2023-02-17 RX ORDER — SODIUM CHLORIDE 9 MG/ML
INJECTION, SOLUTION INTRAVENOUS CONTINUOUS PRN
Status: DISCONTINUED | OUTPATIENT
Start: 2023-02-17 | End: 2023-02-17 | Stop reason: SURG

## 2023-02-17 RX ORDER — NOREPINEPHRINE BITARTRATE/D5W 8 MG/250ML
.02-.2 PLASTIC BAG, INJECTION (ML) INTRAVENOUS CONTINUOUS PRN
Status: DISCONTINUED | OUTPATIENT
Start: 2023-02-17 | End: 2023-02-19

## 2023-02-17 RX ORDER — POTASSIUM CHLORIDE 1.5 G/1.77G
40 POWDER, FOR SOLUTION ORAL AS NEEDED
Status: DISCONTINUED | OUTPATIENT
Start: 2023-02-17 | End: 2023-02-21 | Stop reason: HOSPADM

## 2023-02-17 RX ORDER — ACETAMINOPHEN 325 MG/1
650 TABLET ORAL EVERY 4 HOURS PRN
Status: DISCONTINUED | OUTPATIENT
Start: 2023-02-18 | End: 2023-02-21 | Stop reason: HOSPADM

## 2023-02-17 RX ORDER — AMOXICILLIN 250 MG
2 CAPSULE ORAL NIGHTLY
Status: DISCONTINUED | OUTPATIENT
Start: 2023-02-18 | End: 2023-02-21 | Stop reason: HOSPADM

## 2023-02-17 RX ORDER — DEXTROSE MONOHYDRATE 25 G/50ML
10-50 INJECTION, SOLUTION INTRAVENOUS
Status: DISCONTINUED | OUTPATIENT
Start: 2023-02-17 | End: 2023-02-18

## 2023-02-17 RX ORDER — ACETAMINOPHEN 650 MG/1
650 SUPPOSITORY RECTAL EVERY 4 HOURS PRN
Status: DISCONTINUED | OUTPATIENT
Start: 2023-02-18 | End: 2023-02-21 | Stop reason: HOSPADM

## 2023-02-17 RX ORDER — AMIODARONE HYDROCHLORIDE 50 MG/ML
INJECTION, SOLUTION INTRAVENOUS AS NEEDED
Status: DISCONTINUED | OUTPATIENT
Start: 2023-02-17 | End: 2023-02-17 | Stop reason: SURG

## 2023-02-17 RX ORDER — SODIUM CHLORIDE 9 MG/ML
40 INJECTION, SOLUTION INTRAVENOUS AS NEEDED
Status: DISCONTINUED | OUTPATIENT
Start: 2023-02-17 | End: 2023-02-17 | Stop reason: HOSPADM

## 2023-02-17 RX ORDER — NALOXONE HCL 0.4 MG/ML
0.4 VIAL (ML) INJECTION
Status: DISCONTINUED | OUTPATIENT
Start: 2023-02-17 | End: 2023-02-21 | Stop reason: HOSPADM

## 2023-02-17 RX ORDER — CHLORHEXIDINE GLUCONATE 0.12 MG/ML
15 RINSE ORAL ONCE
Status: COMPLETED | OUTPATIENT
Start: 2023-02-17 | End: 2023-02-17

## 2023-02-17 RX ORDER — ACETAMINOPHEN 650 MG/1
650 SUPPOSITORY RECTAL EVERY 4 HOURS
Status: ACTIVE | OUTPATIENT
Start: 2023-02-17 | End: 2023-02-18

## 2023-02-17 RX ORDER — CEFAZOLIN SODIUM 2 G/100ML
2 INJECTION, SOLUTION INTRAVENOUS EVERY 8 HOURS
Status: COMPLETED | OUTPATIENT
Start: 2023-02-17 | End: 2023-02-19

## 2023-02-17 RX ORDER — MORPHINE SULFATE 2 MG/ML
1 INJECTION, SOLUTION INTRAMUSCULAR; INTRAVENOUS EVERY 4 HOURS PRN
Status: DISCONTINUED | OUTPATIENT
Start: 2023-02-17 | End: 2023-02-21 | Stop reason: HOSPADM

## 2023-02-17 RX ORDER — MIDAZOLAM HYDROCHLORIDE 1 MG/ML
1 INJECTION INTRAMUSCULAR; INTRAVENOUS
Status: DISCONTINUED | OUTPATIENT
Start: 2023-02-17 | End: 2023-02-17 | Stop reason: HOSPADM

## 2023-02-17 RX ORDER — PANTOPRAZOLE SODIUM 40 MG/1
40 TABLET, DELAYED RELEASE ORAL EVERY MORNING
Status: DISCONTINUED | OUTPATIENT
Start: 2023-02-18 | End: 2023-02-21 | Stop reason: HOSPADM

## 2023-02-17 RX ORDER — MAGNESIUM SULFATE HEPTAHYDRATE 40 MG/ML
2 INJECTION, SOLUTION INTRAVENOUS EVERY 8 HOURS
Status: DISPENSED | OUTPATIENT
Start: 2023-02-17 | End: 2023-02-18

## 2023-02-17 RX ORDER — IBUPROFEN 600 MG/1
1 TABLET ORAL
Status: DISCONTINUED | OUTPATIENT
Start: 2023-02-17 | End: 2023-02-18

## 2023-02-17 RX ORDER — LIDOCAINE HYDROCHLORIDE 20 MG/ML
INJECTION, SOLUTION INFILTRATION; PERINEURAL AS NEEDED
Status: DISCONTINUED | OUTPATIENT
Start: 2023-02-17 | End: 2023-02-17 | Stop reason: SURG

## 2023-02-17 RX ORDER — KETOROLAC TROMETHAMINE 30 MG/ML
15 INJECTION, SOLUTION INTRAMUSCULAR; INTRAVENOUS EVERY 6 HOURS PRN
Status: DISCONTINUED | OUTPATIENT
Start: 2023-02-17 | End: 2023-02-21 | Stop reason: HOSPADM

## 2023-02-17 RX ADMIN — HYDROCODONE BITARTRATE AND ACETAMINOPHEN 2 TABLET: 5; 325 TABLET ORAL at 18:10

## 2023-02-17 RX ADMIN — CYCLOBENZAPRINE 10 MG: 10 TABLET, FILM COATED ORAL at 23:33

## 2023-02-17 RX ADMIN — ROCURONIUM BROMIDE 20 MG: 50 INJECTION INTRAVENOUS at 08:14

## 2023-02-17 RX ADMIN — SODIUM CHLORIDE: 9 INJECTION, SOLUTION INTRAVENOUS at 07:19

## 2023-02-17 RX ADMIN — METHADONE HYDROCHLORIDE 10 MG: 10 INJECTION, SOLUTION INTRAMUSCULAR; INTRAVENOUS; SUBCUTANEOUS at 07:34

## 2023-02-17 RX ADMIN — MORPHINE SULFATE 2 MG: 2 INJECTION, SOLUTION INTRAMUSCULAR; INTRAVENOUS at 15:55

## 2023-02-17 RX ADMIN — NITROGLYCERIN 200 MCG: 20 INJECTION INTRAVENOUS at 09:27

## 2023-02-17 RX ADMIN — CYANOCOBALAMIN 1000 MCG: 1000 INJECTION, SOLUTION INTRAMUSCULAR; SUBCUTANEOUS at 18:21

## 2023-02-17 RX ADMIN — ACETAMINOPHEN 1000 MG: 10 INJECTION, SOLUTION INTRAVENOUS at 11:48

## 2023-02-17 RX ADMIN — ROCURONIUM BROMIDE 30 MG: 50 INJECTION INTRAVENOUS at 08:44

## 2023-02-17 RX ADMIN — OXYCODONE 10 MG: 5 TABLET ORAL at 21:14

## 2023-02-17 RX ADMIN — CEFAZOLIN SODIUM 2 G: 2 INJECTION, SOLUTION INTRAVENOUS at 09:54

## 2023-02-17 RX ADMIN — ALBUMIN (HUMAN) 250 ML: 12.5 INJECTION, SOLUTION INTRAVENOUS at 12:25

## 2023-02-17 RX ADMIN — METOCLOPRAMIDE 10 MG: 5 INJECTION, SOLUTION INTRAMUSCULAR; INTRAVENOUS at 23:33

## 2023-02-17 RX ADMIN — AMIODARONE HYDROCHLORIDE 1 MG/MIN: 1.8 INJECTION, SOLUTION INTRAVENOUS at 12:07

## 2023-02-17 RX ADMIN — CHLORHEXIDINE GLUCONATE 1 APPLICATION: 500 CLOTH TOPICAL at 06:04

## 2023-02-17 RX ADMIN — CEFAZOLIN SODIUM 2 G: 2 INJECTION, SOLUTION INTRAVENOUS at 07:29

## 2023-02-17 RX ADMIN — CHLORHEXIDINE GLUCONATE 15 ML: 1.2 SOLUTION ORAL at 06:03

## 2023-02-17 RX ADMIN — SODIUM CHLORIDE 30 ML/HR: 9 INJECTION, SOLUTION INTRAVENOUS at 11:51

## 2023-02-17 RX ADMIN — CEFAZOLIN SODIUM 2 G: 2 INJECTION, SOLUTION INTRAVENOUS at 16:20

## 2023-02-17 RX ADMIN — LIDOCAINE HYDROCHLORIDE 2 MG/MIN: 4 INJECTION, SOLUTION INTRAVENOUS at 15:16

## 2023-02-17 RX ADMIN — ALBUMIN (HUMAN) 250 ML: 12.5 INJECTION, SOLUTION INTRAVENOUS at 13:10

## 2023-02-17 RX ADMIN — FOLIC ACID 1 MG: 5 INJECTION, SOLUTION INTRAMUSCULAR; INTRAVENOUS; SUBCUTANEOUS at 20:51

## 2023-02-17 RX ADMIN — PROPOFOL 40 MCG/KG/MIN: 10 INJECTION, EMULSION INTRAVENOUS at 08:03

## 2023-02-17 RX ADMIN — ALBUMIN (HUMAN) 250 ML: 12.5 INJECTION, SOLUTION INTRAVENOUS at 21:15

## 2023-02-17 RX ADMIN — MAGNESIUM SULFATE HEPTAHYDRATE 2 G: 40 INJECTION, SOLUTION INTRAVENOUS at 15:26

## 2023-02-17 RX ADMIN — CHLORHEXIDINE GLUCONATE 0.12% ORAL RINSE 15 ML: 1.2 LIQUID ORAL at 20:03

## 2023-02-17 RX ADMIN — MAGNESIUM SULFATE HEPTAHYDRATE 2 G: 2 INJECTION, SOLUTION INTRAVENOUS at 20:03

## 2023-02-17 RX ADMIN — PROPOFOL 50 MG: 10 INJECTION, EMULSION INTRAVENOUS at 11:05

## 2023-02-17 RX ADMIN — MUPIROCIN 1 APPLICATION: 20 OINTMENT TOPICAL at 20:03

## 2023-02-17 RX ADMIN — LIDOCAINE HYDROCHLORIDE 80 MG: 20 INJECTION, SOLUTION INFILTRATION; PERINEURAL at 06:44

## 2023-02-17 RX ADMIN — DEXMEDETOMIDINE HYDROCHLORIDE 0.8 MCG/KG/HR: 4 INJECTION, SOLUTION INTRAVENOUS at 12:58

## 2023-02-17 RX ADMIN — ALBUMIN (HUMAN) 250 ML: 12.5 INJECTION, SOLUTION INTRAVENOUS at 17:43

## 2023-02-17 RX ADMIN — SODIUM CHLORIDE: 9 INJECTION, SOLUTION INTRAVENOUS at 06:32

## 2023-02-17 RX ADMIN — NOREPINEPHRINE BITARTRATE 32 MCG: 1 INJECTION, SOLUTION, CONCENTRATE INTRAVENOUS at 09:10

## 2023-02-17 RX ADMIN — NICARDIPINE HYDROCHLORIDE 5 MG/HR: 2.5 INJECTION, SOLUTION INTRAVENOUS at 07:41

## 2023-02-17 RX ADMIN — POTASSIUM CHLORIDE 20 MEQ: 29.8 INJECTION, SOLUTION INTRAVENOUS at 16:43

## 2023-02-17 RX ADMIN — AMIODARONE HYDROCHLORIDE 1 MG/MIN: 1.8 INJECTION, SOLUTION INTRAVENOUS at 23:33

## 2023-02-17 RX ADMIN — MIDAZOLAM 1 MG: 1 INJECTION INTRAMUSCULAR; INTRAVENOUS at 06:23

## 2023-02-17 RX ADMIN — FAMOTIDINE 20 MG: 10 INJECTION INTRAVENOUS at 06:07

## 2023-02-17 RX ADMIN — DEXMEDETOMIDINE 1 MCG/KG/HR: 100 INJECTION, SOLUTION, CONCENTRATE INTRAVENOUS at 07:21

## 2023-02-17 RX ADMIN — PROTAMINE SULFATE 300 MG: 10 INJECTION, SOLUTION INTRAVENOUS at 09:25

## 2023-02-17 RX ADMIN — AMIODARONE HYDROCHLORIDE 150 MG: 50 INJECTION, SOLUTION INTRAVENOUS at 09:07

## 2023-02-17 RX ADMIN — ALBUMIN (HUMAN) 250 ML: 12.5 INJECTION, SOLUTION INTRAVENOUS at 15:29

## 2023-02-17 RX ADMIN — ALPRAZOLAM 0.25 MG: 0.25 TABLET ORAL at 23:33

## 2023-02-17 RX ADMIN — NITROGLYCERIN 100 MCG: 20 INJECTION INTRAVENOUS at 09:37

## 2023-02-17 RX ADMIN — MAGNESIUM SULFATE HEPTAHYDRATE 2 G: 500 INJECTION, SOLUTION INTRAMUSCULAR; INTRAVENOUS at 09:07

## 2023-02-17 RX ADMIN — METHYLPREDNISOLONE SODIUM SUCCINATE 125 MG: 125 INJECTION, POWDER, FOR SOLUTION INTRAMUSCULAR; INTRAVENOUS at 10:55

## 2023-02-17 RX ADMIN — METHADONE HYDROCHLORIDE 10 MG: 10 INJECTION, SOLUTION INTRAMUSCULAR; INTRAVENOUS; SUBCUTANEOUS at 06:36

## 2023-02-17 RX ADMIN — PHENYLEPHRINE HYDROCHLORIDE 100 MCG: 10 INJECTION, SOLUTION INTRAVENOUS at 08:00

## 2023-02-17 RX ADMIN — HEPARIN SODIUM 27000 UNITS: 1000 INJECTION, SOLUTION INTRAVENOUS; SUBCUTANEOUS at 07:55

## 2023-02-17 RX ADMIN — ROCURONIUM BROMIDE 50 MG: 50 INJECTION INTRAVENOUS at 06:47

## 2023-02-17 RX ADMIN — INSULIN HUMAN 2 UNITS/HR: 1 INJECTION, SOLUTION INTRAVENOUS at 14:24

## 2023-02-17 RX ADMIN — METOPROLOL TARTRATE 12.5 MG: 25 TABLET, FILM COATED ORAL at 06:02

## 2023-02-17 RX ADMIN — NITROGLYCERIN 200 MCG: 20 INJECTION INTRAVENOUS at 09:33

## 2023-02-17 RX ADMIN — MORPHINE SULFATE 1 MG: 2 INJECTION, SOLUTION INTRAMUSCULAR; INTRAVENOUS at 20:23

## 2023-02-17 RX ADMIN — AMIODARONE HYDROCHLORIDE 150 MG: 1.5 INJECTION, SOLUTION INTRAVENOUS at 13:06

## 2023-02-17 RX ADMIN — PROPOFOL 200 MG: 10 INJECTION, EMULSION INTRAVENOUS at 06:45

## 2023-02-17 RX ADMIN — AMIODARONE HYDROCHLORIDE 1 MG/MIN: 1.8 INJECTION, SOLUTION INTRAVENOUS at 17:59

## 2023-02-17 RX ADMIN — AMINOCAPROIC ACID 10 G: 250 INJECTION, SOLUTION INTRAVENOUS at 09:30

## 2023-02-17 RX ADMIN — POTASSIUM PHOSPHATE, MONOBASIC POTASSIUM PHOSPHATE, DIBASIC 21 MMOL: 224; 236 INJECTION, SOLUTION, CONCENTRATE INTRAVENOUS at 18:18

## 2023-02-17 RX ADMIN — AMINOCAPROIC ACID 10 G: 250 INJECTION, SOLUTION INTRAVENOUS at 07:39

## 2023-02-17 RX ADMIN — ACETAMINOPHEN 1000 MG: 10 INJECTION, SOLUTION INTRAVENOUS at 20:03

## 2023-02-17 RX ADMIN — CALCIUM GLUCONATE 2 G: 20 INJECTION, SOLUTION INTRAVENOUS at 13:31

## 2023-02-17 NOTE — ANESTHESIA PROCEDURE NOTES
Arterial Line      Patient reassessed immediately prior to procedure    Patient location during procedure: OR  Start time: 2/17/2023 6:40 AM  Stop Time:2/17/2023 6:44 AM       Performed By   Anesthesiologist: oLyd Jerome MD   Preanesthetic Checklist  Completed: patient identified, risks and benefits discussed, surgical consent, pre-op evaluation and timeout performed  Arterial Line Prep    Sterile Tech: cap, gloves and mask  Prep: alcohol swabs and ChloraPrep  Patient monitoring: blood pressure monitoring, continuous pulse oximetry and EKG  Arterial Line Procedure   Laterality:left  Location:  radial artery  Catheter size: 20 G   Guidance: ultrasound guided  PROCEDURE NOTE/ULTRASOUND INTERPRETATION.  Using ultrasound guidance the potential vascular sites for insertion of the catheter were visualized to determine the patency of the vessel to be used for vascular access.  After selecting the appropriate site for insertion, the needle was visualized under ultrasound being inserted into the ulnar artery, followed by ultrasound confirmation of wire and catheter placement. There were no abnormalities seen on ultrasound; an image was taken; and the patient tolerated the procedure with no complications.   Number of attempts: 1  Successful placement: yes Images: still images not obtained  Post Assessment   Dressing Type: occlusive dressing applied and secured with tape.   Complications no   Patient Tolerance: patient tolerated the procedure well with no apparent complications  Additional Notes  Ultrasound Interpretation:  Using ultrasound guidance the potential vascular sites for insertion of the catheter were visualized to determine the patency of the vessel to be used for vascular access.  After selecting the appropriate site for insertion, the needle was visualized under ultrasound being inserted into the vessel, followed by ultrasound confirmation of wire and catheter placement.  There were no abnormalities seen  on ultrasound; an image was taken/ and the patient tolerated the procedure with no complications.

## 2023-02-17 NOTE — ANESTHESIA PROCEDURE NOTES
Airway  Date/Time: 2/17/2023 6:52 AM  Airway not difficult    General Information and Staff    Patient location during procedure: OR  Anesthesiologist: Loyd Jerome MD    Indications and Patient Condition    Preoxygenated: yes  Mask difficulty assessment: 2 - vent by mask + OA or adjuvant +/- NMBA    Final Airway Details  Final airway type: endotracheal airway      Successful airway: ETT  Cuffed: yes   Successful intubation technique: direct laryngoscopy  Facilitating devices/methods: Bougie and anterior pressure/BURP  Endotracheal tube insertion site: oral  Blade: Padgett  Blade size: 3  ETT size (mm): 8.0  Cormack-Lehane Classification: grade IIa - partial view of glottis  Placement verified by: capnometry   Measured from: teeth  ETT/EBT  to teeth (cm): 23  Number of attempts at approach: 2  Assessment: lips, teeth, and gum same as pre-op and atraumatic intubation

## 2023-02-17 NOTE — ANESTHESIA PROCEDURE NOTES
Central Line      Patient reassessed immediately prior to procedure    Patient location during procedure: OR  Start time: 2/17/2023 7:00 AM  Stop Time:2/17/2023 7:15 AM  Indications: vascular access and central pressure monitoring  Staff  Anesthesiologist: Loyd Jerome MD  Preanesthetic Checklist  Completed: patient identified and risks and benefits discussed  Central Line Prep  Sterile Tech:cap, gloves, gown, mask and sterile barriers  Prep: chloraprep  Patient monitoring: blood pressure monitoring, continuous pulse oximetry and EKG  Central Line Procedure  Laterality:right  Location:internal jugular  Catheter Type:double lumen and MAC  Catheter Size:9 Fr  Guidance:ultrasound guided  PROCEDURE NOTE/ULTRASOUND INTERPRETATION.  Using ultrasound guidance the potential vascular sites for insertion of the catheter were visualized to determine the patency of the vessel to be used for vascular access.  After selecting the appropriate site for insertion, the needle was visualized under ultrasound being inserted into the internal jugular vein, followed by ultrasound confirmation of wire and catheter placement. There were no abnormalities seen on ultrasound; an image was taken; and the patient tolerated the procedure with no complications. Images: still images not obtained  Assessment  Post procedure:biopatch applied, line sutured, occlusive dressing applied and secured with tape  Assessement:blood return through all ports and Dino Test  Additional Notes  Ultrasound Interpretation:  Using ultrasound guidance the potential vascular sites for insertion of the catheter were visualized to determine the patency of the vessel to be used for vascular access.  After selecting the appropriate site for insertion, the needle was visualized under ultrasound being inserted into the vessel, followed by ultrasound confirmation of wire and catheter placement.  There were no abnormalities seen on ultrasound; an image was taken/ and  the patient tolerated the procedure well    On first attempt, kirk test showed arterial placement of 20g angiocath. Ultrasound revealed small artery underneath internal jugular vein although carotid was also visualized medial to jugular vein.  On second attempt kirk test confirmed venous placement of wire and angiocath.

## 2023-02-17 NOTE — ANESTHESIA POSTPROCEDURE EVALUATION
Patient: Regino Fox    Procedure Summary     Date: 02/17/23 Room / Location: [address] CARDIOVASCULAR OPERATING ROOM    Anesthesia Start: 0629 Anesthesia Stop: 1113    Procedure: JEFFERSON STERNOTOMY MITRAL VALVE REPAIR AND PRP (Chest) Diagnosis:       Mitral valve insufficiency, unspecified etiology      (Mitral valve insufficiency, unspecified etiology [I34.0])    Surgeons: Matthew Gann MD Provider: Loyd Jerome MD    Anesthesia Type: general, [place], CVL, PAC ASA Status: 4          Anesthesia Type: general, Shilpa, CVL, PAC    Vitals  Vitals Value Taken Time   BP 94/68 02/17/23 1501   Temp 37.1 °C (98.78 °F) 02/17/23 1519   Pulse 89 02/17/23 1519   Resp 12 02/17/23 1430   SpO2 98 % 02/17/23 1519   Vitals shown include unvalidated device data.        Post Anesthesia Care and Evaluation    Patient location during evaluation: bedside  Pain management: adequate    Airway patency: patent  Anesthetic complications: No anesthetic complications    Cardiovascular status: acceptable  Respiratory status: acceptable  Hydration status: acceptable

## 2023-02-17 NOTE — ANESTHESIA PROCEDURE NOTES
Diagnostic intraoperative [facility] with 2D and 3d imaging and follow up color Doppler study    Procedure Performed: diagnostic intraoperative [facility] with 2D and 3d imaging and follow up color Doppler study       Start Time:  2/17/2023 7:40 AM       End Time:   2/17/2023 8:34 AM      General Procedure Information  Diagnostic Indications for Echo:  assessment of surgical repair  Physician Requesting Echo: Matthew Gann MD  Location performed:  OR  Intubated  Bite block placed  Heart visualized  Probe Insertion:  Easy  Modalities:  2D only, color flow mapping, continuous wave Doppler and pulse wave Doppler    Echocardiographic and Doppler Measurements    Ventricles    Right Ventricle:  Cavity size dilated.  Global function normal.    Left Ventricle:  Cavity size dilated.  Global Function mildly impaired.  Ejection Fraction 45%.          Valves    Aortic Valve:  Annulus normal.  Stenosis not present.  Regurgitation moderate.      Mitral Valve:  Annulus dilated.  Stenosis not present.  Regurgitation severe.  Leaflet motions prolapsed.  Specific leaflet segments with abnormal motions are described in the following comments:       A2A3    Tricuspid Valve:  Annulus normal.  Regurgitation mild.        Aorta    Aortic Arch:  Size normal.    Descending Aorta:  Size normal.        Atria      Left Atrium:  Size dilated.  Left atrial appendage normal.                  Anesthesia Information  Performed Personally      Echocardiogram Comments:       Diagnosis: non-rheumatic mitral regurgitation.  Eccentric wall-hugging mitral regurgitation jet, posteriorly directed.  Enormous billowing anterior leaflet, with prolapse, mostly at A2 and A3 near medial commissure.  There is also posterior leaflet prolapse above annulus but the anterior leaflet prolapses above posterior causing posterior directed jet.  Moderate aortic insufficiency, central jet.    Postoperatively, no mitral regurgitation, mean gradient through mitral valve was 2 mm Hg,  annuloplasty ring in place

## 2023-02-17 NOTE — CONSULTS
Subjective     REASON FOR CONSULTATION: 1/ mild thrombocytopenia after mitral valve replacement, pt has had sepsis before and had DIC AND PLAT COUNT OF 20 THOUSAND 3 YEARS AGO, CHRONIC LOW LEVEL THROMBOCYTOPENIA.    2. AS A CHILD MULTIPLE HOSPITAL ADMISSIONS FOR REPETITIVE INFECTIONS: QUESTION COMBINED IMMUNODEFICIENCY.    3. OSTEOPOROSIS AS A CHILD AND FRACTURED BONES: WHAT KIND OF GENETIC BONE DISORDER IN A PATIENT WITH MARFAN LIKE SYNDROME?.    4. QUESTION RHEUMATIC FEVER . HEART MURMUR PRESENT ONLY  AT AGE 11 AFTER MULTIPLE INFECTIONS.       Provide an opinion on any further workup or treatment                             REQUESTING PHYSICIAN:   Matthew Gann MD       Attending     Since 2/17/2023     [phone]          RECORDS OBTAINED:  Records of the patients history including those obtained from the referring provider were reviewed and summarized in detail.      History of Present Illness   On 02/17/2023 this 37-year-old male has been admitted to the hospital the day before to proceed with mitral valve replacement therapy by Dr. Gann that has been accomplished this morning. We have been consulted because the patient has mild thrombocytopenia. It seems according to the patient's mother with whom I have had an extensive discussion today that he has had thrombocytopenia in the past on multiple occasions, the most severe one 3 years ago when he developed septicemia as a complication of a tooth abscess and ended up unconscious at home and taken to the hospital with septic syndrome requiring multiple blood products administration, mechanical intubation with resuscitation and aggressive antibiotic therapy with resolution.     The patient has had in laboratory testing done before and available and posted below mild degree of variable thrombocytopenia.     Talking to the patient's mother as well the patient had multiple infections as an infant until he was 5 years old with multiple admissions to the  hospital with respiratory infections of all sorts. He was in and out of the hospital all the time. Besides this a murmur was detected in his heart at age 11 and when I mentioned rheumatic fever the patient's mother opened her eyes and said that she probably was that he had this condition then. Obviously raises the question if the murmur is related to a genetic defect or things of this nature or was acquired condition. In any event, he has undergone the replacement of this.     The patient's mother also mentions the fact that the patient was diagnosed with osteoporosis as a child having multiple bone fractures with no major trauma to trigger the phenomenon. This is not enough to call this in my knowledge, osteogenesis imperfecta. 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     Obviously the patient is intubated, mechanically ventilated. He is on multiple medicines and he has been just in the recovery area after his heart operation. He does not provide any other information to me.     On further questioning to the mother if she knows if he had any spleen enlargement before, she is not aware of any of these things.      Past Medical History:   Diagnosis Date   • Asthma    • Bicuspid aortic valve    • Broken ankle, left, sequela     HX   • CHF (congestive heart failure) (HCC)    • Congenital heart disease    • Dyspnea on exertion    • Heart murmur    • Heart valve disease    • History of migraine    • Hypertension     QUIT TAKING MEDS AGE 19 DUE TO INSURANCE REASONS   • Marfan syndrome    • Mitral valve prolapse    • Scoliosis    • TIA (transient ischemic attack)     HX        Past Surgical History:   Procedure Laterality Date   • FEMUR SURGERY Right     X2 FOR FRACTURE AGE 11   • LYMPH NODE DISSECTION      NECK   • TRANSESOPHAGEAL ECHOCARDIOGRAM ([facility]) N/A 08/11/2022    Procedure: TRANSESOPHAGEAL ECHOCARDIOGRAM;   "Surgeon: ADRIANNA Bernard MD;  Location: [facility] ENDOSCOPY;  Service: Cardiology;  Laterality: N/A;        No current facility-administered medications on file prior to encounter.     No current outpatient medications on file prior to encounter.        ALLERGIES:  No Known Allergies     Social History     Socioeconomic History   • Marital status: Single   Tobacco Use   • Smoking status: Former     Types: Cigarettes     Quit date: 2020     Years since quitting: 3.1   • Smokeless tobacco: Never   • Tobacco comments:     MORE OF SOCIAL SMOKER FOR 8 YEARS   Vaping Use   • Vaping Use: Every day   • Substances: Nicotine, Flavoring   • Devices: Disposable   Substance and Sexual Activity   • Alcohol use: Yes     Comment: rarely   • Drug use: Never   • Sexual activity: Defer        Family History   Problem Relation Age of Onset   • Aortic aneurysm Mother    • Heart attack Mother    • No Known Problems Father    • Malig Hyperthermia Neg Hx         Review of Systems   Unable to perform ROS: Intubated        Objective     Vitals:    02/17/23 0559 02/17/23 1113   BP: 120/81 100/61   BP Location: Right arm    Patient Position: Lying    Pulse: 63 85   Resp: 16 12   Temp: 98.8 °F (37.1 °C)    TempSrc: Oral    SpO2: 97% 99%   Weight: 83 kg (183 lb)    Height: 198.1 cm (78\")      Current Status 1/18/2022   ECOG score 0       Physical Exam  Constitutional:       Appearance: He is ill-appearing.   HENT:      Head: Normocephalic.   Eyes:      General: No scleral icterus.  Cardiovascular:      Rate and Rhythm: Normal rate and regular rhythm.      Pulses: Normal pulses.      Heart sounds: Normal heart sounds. No murmur heard.  Pulmonary:      Effort: Pulmonary effort is normal.      Breath sounds: Normal breath sounds.   Abdominal:      General: There is no distension.      Palpations: Abdomen is soft. There is no mass.      Tenderness: There is no abdominal tenderness. There is no guarding or rebound.      Hernia: No hernia is present. "   Musculoskeletal:      Right lower leg: No edema.      Left lower leg: No edema.      Comments: VERY LONG FINGERS IN HIS HANDS   Lymphadenopathy:      Cervical: No cervical adenopathy.   Skin:     General: Skin is warm and dry.           RECENT LABS:  Hematology WBC   Date Value Ref Range Status   02/17/2023 5.72 3.40 - 10.80 10*3/mm3 Final     RBC   Date Value Ref Range Status   02/17/2023 4.27 4.14 - 5.80 10*6/mm3 Final     Hemoglobin   Date Value Ref Range Status   02/17/2023 12.5 (L) 13.0 - 17.7 g/dL Final     Hematocrit   Date Value Ref Range Status   02/17/2023 37.1 (L) 37.5 - 51.0 % Final     Platelets   Date Value Ref Range Status   02/17/2023 125 (L) 140 - 450 10*3/mm3 Final        CBC Auto Differential  Order: [id number] - Part of Panel Order [id number]   Status: Final result      Visible to patient: No (scheduled for 2/18/2023  2:02 AM)     Specimen Information: Blood    0 Result Notes            Component  Ref Range & Units 11:18  (2/17/23) 09:45  (2/17/23) 2 d ago  (2/15/23) 2 mo ago  (12/10/22) 6 mo ago  (8/20/22) 1 yr ago  (1/18/22) 1 yr ago  (1/18/22)   WBC  3.40 - 10.80 10*3/mm3 5.72  3.45  6.92  7.34  6.66   5.68    RBC  4.14 - 5.80 10*6/mm3 4.27  4.13 Low   5.30  5.27  5.35   5.65    Hemoglobin  13.0 - 17.7 g/dL 12.5 Low   12.4 Low   15.8  16.0  15.8   16.9    Hematocrit  37.5 - 51.0 % 37.1 Low   36.5 Low   46.8  45.6  45.8   48.2    MCV  79.0 - 97.0 fL 86.9  88.4  88.3  86.5  85.6   85.3    MCH  26.6 - 33.0 pg 29.3  30.0  29.8  30.4  29.5   29.9    MCHC  31.5 - 35.7 g/dL 33.7  34.0  33.8  35.1  34.5   35.1    RDW  12.3 - 15.4 % 13.0  12.9  12.9  12.8  12.9   12.9    RDW-SD  37.0 - 54.0 fl 40.6  41.4  41.5  40.2  39.9   39.8    MPV  6.0 - 12.0 fL 11.5  11.2  12.5 High   12.5 High   12.4 High    12.2 High     Platelets  140 - 450 10*3/mm3 125 Low   89 Low   145  137 Low   144  116 Low   133 Low     Neutrophil %  42.7 - 76.0 % 93.0 High    66.1  58.1  55.1   65.4    Lymphocyte %  19.6 - 45.3 %  4.4 Low    19.1 Low   29.4  30.8   23.4    Monocyte %  5.0 - 12.0 % 0.9 Low    9.8  7.9  9.3   8.3    Eosinophil %  0.3 - 6.2 % 0.3   3.3  3.5  3.8   1.8           Assessment & Plan     1.On 02/17/2023 the patient has very mild degree of thrombocytopenia, 125,000 and 89,000 but reviewing the platelet count during the last 3 years there is always a fluctuation in the number most of the time. It raises the question if this patient has some chronic phenomenon more than acute phenomenon just exacerbated by his recent heart operation. The surgical site is completely dry. The catheter and all the lines are completely dry. He has no clinical bleeding and this will be further investigated with an immature platelet fraction. I doubt that LDH is going to be of benefit at this point after such a major intervention. It is going to be some degree of hemolysis anyway and LDH is not going to provide any information. I think it is worth it to do a B12 and folic acid levels.     Eventually in the future when the patient is better we would like to do at least a CT scan of the abdomen to evaluate spleen and liver and see if there is any evidence of chronic splenomegaly. The clinical examination makes me to think that maybe indeed the spleen is mildly enlarged but I am not compromising with that physical finding.     Therefore, the platelet count will be watched in absence of any other intervention. Obviously with this kind of number and no bleeding there is no need for blood products.    2.The patient according to the mother had multiple admissions to the hospital as an infant to the point that he used to live in the hospital most of the time in and out and when I mentioned issues pertinent to rheumatic fever she opened her eyes and said likely he had this condition. In any event, this patient is now 37 years old and obviously raises the question given the morphological alterations if this patient has some sort of immunodeficiency and  severe combined immunodeficiency. I think it is even worth it to check a level if immunoglobulin G, A and M for baseline.     The patient's white count, white count differential were normal.     3.The other issue that this patient has had is multiple fractures when he was a young age with no major trauma to trigger this. The patient's mother states that he was called osteoporotic then and raises the question about the nature of this and if he has some form of congenital metabolic bone disorder of some sort not bad enough to call this osteogenesis imperfecta but enough to trigger this phenomenon. I think it is worth it to at least to do a vitamin D, magnesium, phosphorus level and a PTH.      4.On 02/17/2023 this patient was further reviewed in regard to so many other issues. It caught my attention his very long fingers in his hand. He had hyperelasticity according to the mother when he was a young child. 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 Raises the question if he has Marfanoid issues and eventually he needs to be seen by genetics in regard to analysis of this situation. This could be eventually scheduled.     His mother was actually very appreciative of my conversation with her and the interaction in regard to these other issues that are not precisely pertinent to his thrombocytopenia but had issues that needed to be addressed in some way or the other. 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  5.Further reviewing this patient he has a very low vitamin D level that will require to have correction orally once that he is able to receive food and medication by mouth. The patient has profound hypophosphatemia. This could have a lot to do with issues in regard to metabolism and furthermore this degree of hypophosphatemia can lead into  hemolytic anemia because of lack of energy in the red cell membrane to keep integrity of the red corpuscle. He will require aggressive phosphate replacement therapy as soon as now. Furthermore the patient has very significant folate deficiency and his level of vitamin B12 is very marginal. I will initiate replacement therapy for this.     All these issues are very striking. His PTH level is also elevated and this probably announced the need for this patient eventually to be seen also at Endocrinology. They do not do inpatient hospital visits anymore.      DURING THE VISIT WITH THE PATIENT TODAY , PATIENT HAD FACE MASK, I HAD PROPER PROTECTIVE EQUIPMENT, AND I DID HAND HYGIENE WITH SOAP AND WATER BEFORE AND AFTER THE VISIT.

## 2023-02-17 NOTE — ANESTHESIA PROCEDURE NOTES
[facility]      Patient reassessed immediately prior to procedure    Patient location during procedure: OR  Start time: 2/17/2023 7:15 AM  Stop Time:2/17/2023 7:20 AM  Staff  Anesthesiologist: Loyd Jerome MD  Central Line Prep  Sterile Tech:cap, gloves and mask  Prep: chloraprep  Central Line Procedure  Laterality:right  Location:internal jugular  Catheter Type:[facility]-Les  Assessment  Patient Tolerance:patient tolerated the procedure well with no apparent complications

## 2023-02-18 ENCOUNTER — APPOINTMENT (OUTPATIENT)
Dept: GENERAL RADIOLOGY | Facility: HOSPITAL | Age: 38
DRG: 220 | End: 2023-02-18
Payer: MEDICAID

## 2023-02-18 LAB
ALBUMIN SERPL-MCNC: 4.1 G/DL (ref 3.5–5.2)
ALP SERPL-CCNC: 50 U/L (ref 39–117)
ALT SERPL W P-5'-P-CCNC: 20 U/L (ref 1–41)
ANION GAP SERPL CALCULATED.3IONS-SCNC: 10.4 MMOL/L (ref 5–15)
ANION GAP SERPL CALCULATED.3IONS-SCNC: 7 MMOL/L (ref 5–15)
ANISOCYTOSIS BLD QL: ABNORMAL
AST SERPL-CCNC: 84 U/L (ref 1–40)
BASOPHILS # BLD AUTO: 0.05 10*3/MM3 (ref 0–0.2)
BASOPHILS NFR BLD AUTO: 0.2 % (ref 0–1.5)
BH BB BLOOD EXPIRATION DATE: NORMAL
BH BB BLOOD TYPE BARCODE: 6200
BH BB BLOOD TYPE BARCODE: 7300
BH BB DISPENSE STATUS: NORMAL
BH BB PRODUCT CODE: NORMAL
BH BB UNIT NUMBER: NORMAL
BILIRUB CONJ SERPL-MCNC: 0.2 MG/DL (ref 0–0.3)
BILIRUB INDIRECT SERPL-MCNC: 0.3 MG/DL
BILIRUB SERPL-MCNC: 0.5 MG/DL (ref 0–1.2)
BUN SERPL-MCNC: 10 MG/DL (ref 6–20)
BUN SERPL-MCNC: 9 MG/DL (ref 6–20)
BUN/CREAT SERPL: 10.9 (ref 7–25)
BUN/CREAT SERPL: 9.7 (ref 7–25)
CA-I BLD-MCNC: 4.9 MG/DL (ref 4.6–5.4)
CA-I SERPL ISE-MCNC: 1.22 MMOL/L (ref 1.15–1.35)
CALCIUM SPEC-SCNC: 7.9 MG/DL (ref 8.6–10.5)
CALCIUM SPEC-SCNC: 8.3 MG/DL (ref 8.6–10.5)
CHLORIDE SERPL-SCNC: 102 MMOL/L (ref 98–107)
CHLORIDE SERPL-SCNC: 107 MMOL/L (ref 98–107)
CLOSURE TME COLL+ADP BLD: 104 SECONDS (ref 52–122)
CLOSURE TME COLL+EPINEP BLD: 235 SECONDS (ref 68–148)
CO2 SERPL-SCNC: 23 MMOL/L (ref 22–29)
CO2 SERPL-SCNC: 24.6 MMOL/L (ref 22–29)
CREAT SERPL-MCNC: 0.92 MG/DL (ref 0.76–1.27)
CREAT SERPL-MCNC: 0.93 MG/DL (ref 0.76–1.27)
DEPRECATED RDW RBC AUTO: 40.4 FL (ref 37–54)
EGFRCR SERPLBLD CKD-EPI 2021: 107.1 ML/MIN/1.73
EGFRCR SERPLBLD CKD-EPI 2021: 108.5 ML/MIN/1.73
EOSINOPHIL # BLD AUTO: 0 10*3/MM3 (ref 0–0.4)
EOSINOPHIL NFR BLD AUTO: 0 % (ref 0.3–6.2)
ERYTHROCYTE [DISTWIDTH] IN BLOOD BY AUTOMATED COUNT: 13 % (ref 12.3–15.4)
GLUCOSE BLDC GLUCOMTR-MCNC: 109 MG/DL (ref 70–130)
GLUCOSE BLDC GLUCOMTR-MCNC: 112 MG/DL (ref 70–130)
GLUCOSE BLDC GLUCOMTR-MCNC: 122 MG/DL (ref 70–130)
GLUCOSE BLDC GLUCOMTR-MCNC: 129 MG/DL (ref 70–130)
GLUCOSE BLDC GLUCOMTR-MCNC: 131 MG/DL (ref 70–130)
GLUCOSE BLDC GLUCOMTR-MCNC: 136 MG/DL (ref 70–130)
GLUCOSE BLDC GLUCOMTR-MCNC: 138 MG/DL (ref 70–130)
GLUCOSE BLDC GLUCOMTR-MCNC: 148 MG/DL (ref 70–130)
GLUCOSE BLDC GLUCOMTR-MCNC: 155 MG/DL (ref 70–130)
GLUCOSE SERPL-MCNC: 127 MG/DL (ref 65–99)
GLUCOSE SERPL-MCNC: 152 MG/DL (ref 65–99)
HCT VFR BLD AUTO: 32.5 % (ref 37.5–51)
HGB BLD-MCNC: 11.2 G/DL (ref 13–17.7)
INR PPP: 1.46 (ref 0.9–1.1)
LYMPHOCYTES # BLD AUTO: 0.5 10*3/MM3 (ref 0.7–3.1)
LYMPHOCYTES # BLD MANUAL: 0 10*3/MM3 (ref 0.7–3.1)
LYMPHOCYTES NFR BLD AUTO: 1.6 % (ref 19.6–45.3)
LYMPHOCYTES NFR BLD MANUAL: 1.1 % (ref 5–12)
MAGNESIUM SERPL-MCNC: 2.5 MG/DL (ref 1.6–2.6)
MCH RBC QN AUTO: 29.9 PG (ref 26.6–33)
MCHC RBC AUTO-ENTMCNC: 34.5 G/DL (ref 31.5–35.7)
MCV RBC AUTO: 86.7 FL (ref 79–97)
MONOCYTES # BLD AUTO: 1.28 10*3/MM3 (ref 0.1–0.9)
MONOCYTES # BLD: 0.31 10*3/MM3 (ref 0.1–0.9)
MONOCYTES NFR BLD AUTO: 4.2 % (ref 5–12)
NEUTROPHILS # BLD AUTO: 27.47 10*3/MM3 (ref 1.7–7)
NEUTROPHILS NFR BLD AUTO: 28.58 10*3/MM3 (ref 1.7–7)
NEUTROPHILS NFR BLD AUTO: 93.4 % (ref 42.7–76)
NEUTROPHILS NFR BLD MANUAL: 98.9 % (ref 42.7–76)
PHOSPHATE SERPL-MCNC: 3.5 MG/DL (ref 2.5–4.5)
PHOSPHATE SERPL-MCNC: 3.8 MG/DL (ref 2.5–4.5)
PLAT MORPH BLD: NORMAL
PLATELET # BLD AUTO: 141 10*3/MM3 (ref 140–450)
PMV BLD AUTO: 11.7 FL (ref 6–12)
POTASSIUM SERPL-SCNC: 4.5 MMOL/L (ref 3.5–5.2)
POTASSIUM SERPL-SCNC: 5.3 MMOL/L (ref 3.5–5.2)
PROT SERPL-MCNC: 5.7 G/DL (ref 6–8.5)
PROTHROMBIN TIME: 17.9 SECONDS (ref 11.7–14.2)
QT INTERVAL: 433 MS
RBC # BLD AUTO: 3.75 10*6/MM3 (ref 4.14–5.8)
SODIUM SERPL-SCNC: 137 MMOL/L (ref 136–145)
SODIUM SERPL-SCNC: 137 MMOL/L (ref 136–145)
UNIT  ABO: NORMAL
UNIT  RH: NORMAL
VARIANT LYMPHS NFR BLD MANUAL: 0 % (ref 19.6–45.3)
WBC MORPH BLD: NORMAL
WBC NRBC COR # BLD: 30.58 10*3/MM3 (ref 3.4–10.8)

## 2023-02-18 PROCEDURE — 86900 BLOOD TYPING SEROLOGIC ABO: CPT

## 2023-02-18 PROCEDURE — 25010000002 AMIODARONE IN DEXTROSE 5% 360-4.14 MG/200ML-% SOLUTION: Performed by: PHYSICIAN ASSISTANT

## 2023-02-18 PROCEDURE — 83735 ASSAY OF MAGNESIUM: CPT | Performed by: THORACIC SURGERY (CARDIOTHORACIC VASCULAR SURGERY)

## 2023-02-18 PROCEDURE — 93005 ELECTROCARDIOGRAM TRACING: CPT | Performed by: INTERNAL MEDICINE

## 2023-02-18 PROCEDURE — 25010000002 METOCLOPRAMIDE PER 10 MG: Performed by: NURSE PRACTITIONER

## 2023-02-18 PROCEDURE — 25010000002 ONDANSETRON PER 1 MG: Performed by: NURSE PRACTITIONER

## 2023-02-18 PROCEDURE — 25010000002 CEFAZOLIN IN DEXTROSE 2-4 GM/100ML-% SOLUTION: Performed by: NURSE PRACTITIONER

## 2023-02-18 PROCEDURE — 99232 SBSQ HOSP IP/OBS MODERATE 35: CPT | Performed by: INTERNAL MEDICINE

## 2023-02-18 PROCEDURE — 99233 SBSQ HOSP IP/OBS HIGH 50: CPT | Performed by: INTERNAL MEDICINE

## 2023-02-18 PROCEDURE — 97162 PT EVAL MOD COMPLEX 30 MIN: CPT

## 2023-02-18 PROCEDURE — P9041 ALBUMIN (HUMAN),5%, 50ML: HCPCS | Performed by: NURSE PRACTITIONER

## 2023-02-18 PROCEDURE — 85576 BLOOD PLATELET AGGREGATION: CPT | Performed by: INTERNAL MEDICINE

## 2023-02-18 PROCEDURE — 25010000002 LIDOCAINE PER 10 MG: Performed by: NURSE PRACTITIONER

## 2023-02-18 PROCEDURE — 80076 HEPATIC FUNCTION PANEL: CPT | Performed by: INTERNAL MEDICINE

## 2023-02-18 PROCEDURE — 80048 BASIC METABOLIC PNL TOTAL CA: CPT | Performed by: THORACIC SURGERY (CARDIOTHORACIC VASCULAR SURGERY)

## 2023-02-18 PROCEDURE — 93010 ELECTROCARDIOGRAM REPORT: CPT | Performed by: INTERNAL MEDICINE

## 2023-02-18 PROCEDURE — 85025 COMPLETE CBC W/AUTO DIFF WBC: CPT | Performed by: THORACIC SURGERY (CARDIOTHORACIC VASCULAR SURGERY)

## 2023-02-18 PROCEDURE — 25010000002 ALBUMIN HUMAN 5% PER 50 ML: Performed by: THORACIC SURGERY (CARDIOTHORACIC VASCULAR SURGERY)

## 2023-02-18 PROCEDURE — 25010000002 IMMUNE GLOBULIN (HUMAN) 20 GM/200ML SOLUTION: Performed by: INTERNAL MEDICINE

## 2023-02-18 PROCEDURE — 82330 ASSAY OF CALCIUM: CPT | Performed by: THORACIC SURGERY (CARDIOTHORACIC VASCULAR SURGERY)

## 2023-02-18 PROCEDURE — 84100 ASSAY OF PHOSPHORUS: CPT | Performed by: INTERNAL MEDICINE

## 2023-02-18 PROCEDURE — 25010000002 IMMUNE GLOBULIN (HUMAN) 10 GM/100ML SOLUTION: Performed by: INTERNAL MEDICINE

## 2023-02-18 PROCEDURE — 25010000002 FUROSEMIDE PER 20 MG: Performed by: NURSE PRACTITIONER

## 2023-02-18 PROCEDURE — 25010000002 MAGNESIUM SULFATE 2 GM/50ML SOLUTION: Performed by: NURSE PRACTITIONER

## 2023-02-18 PROCEDURE — 97530 THERAPEUTIC ACTIVITIES: CPT

## 2023-02-18 PROCEDURE — 25010000002 ALBUMIN HUMAN 5% PER 50 ML: Performed by: NURSE PRACTITIONER

## 2023-02-18 PROCEDURE — 25010000002 CALCIUM GLUCONATE-NACL 1-0.675 GM/50ML-% SOLUTION: Performed by: NURSE PRACTITIONER

## 2023-02-18 PROCEDURE — 82962 GLUCOSE BLOOD TEST: CPT

## 2023-02-18 PROCEDURE — 63710000001 DIPHENHYDRAMINE PER 50 MG: Performed by: INTERNAL MEDICINE

## 2023-02-18 PROCEDURE — 80048 BASIC METABOLIC PNL TOTAL CA: CPT | Performed by: NURSE PRACTITIONER

## 2023-02-18 PROCEDURE — 71045 X-RAY EXAM CHEST 1 VIEW: CPT

## 2023-02-18 PROCEDURE — 25010000002 KETOROLAC TROMETHAMINE PER 15 MG: Performed by: NURSE PRACTITIONER

## 2023-02-18 PROCEDURE — 99024 POSTOP FOLLOW-UP VISIT: CPT | Performed by: THORACIC SURGERY (CARDIOTHORACIC VASCULAR SURGERY)

## 2023-02-18 PROCEDURE — 84100 ASSAY OF PHOSPHORUS: CPT | Performed by: NURSE PRACTITIONER

## 2023-02-18 PROCEDURE — P9041 ALBUMIN (HUMAN),5%, 50ML: HCPCS | Performed by: THORACIC SURGERY (CARDIOTHORACIC VASCULAR SURGERY)

## 2023-02-18 PROCEDURE — 86901 BLOOD TYPING SEROLOGIC RH(D): CPT

## 2023-02-18 PROCEDURE — 25010000002 ENOXAPARIN PER 10 MG: Performed by: NURSE PRACTITIONER

## 2023-02-18 RX ORDER — DIPHENHYDRAMINE HCL 25 MG
25 CAPSULE ORAL ONCE
Status: COMPLETED | OUTPATIENT
Start: 2023-02-18 | End: 2023-02-18

## 2023-02-18 RX ORDER — SCOLOPAMINE TRANSDERMAL SYSTEM 1 MG/1
1 PATCH, EXTENDED RELEASE TRANSDERMAL
Status: DISCONTINUED | OUTPATIENT
Start: 2023-02-18 | End: 2023-02-19

## 2023-02-18 RX ORDER — GUAIFENESIN 600 MG/1
1200 TABLET, EXTENDED RELEASE ORAL EVERY 12 HOURS SCHEDULED
Status: DISCONTINUED | OUTPATIENT
Start: 2023-02-18 | End: 2023-02-21 | Stop reason: HOSPADM

## 2023-02-18 RX ORDER — ACETAMINOPHEN 325 MG/1
650 TABLET ORAL ONCE
Status: COMPLETED | OUTPATIENT
Start: 2023-02-18 | End: 2023-02-18

## 2023-02-18 RX ORDER — FUROSEMIDE 10 MG/ML
40 INJECTION INTRAMUSCULAR; INTRAVENOUS ONCE
Status: COMPLETED | OUTPATIENT
Start: 2023-02-18 | End: 2023-02-18

## 2023-02-18 RX ORDER — NICOTINE POLACRILEX 4 MG
15 LOZENGE BUCCAL
Status: DISCONTINUED | OUTPATIENT
Start: 2023-02-18 | End: 2023-02-21 | Stop reason: HOSPADM

## 2023-02-18 RX ORDER — IBUPROFEN 600 MG/1
1 TABLET ORAL
Status: DISCONTINUED | OUTPATIENT
Start: 2023-02-18 | End: 2023-02-21 | Stop reason: HOSPADM

## 2023-02-18 RX ORDER — CALCIUM GLUCONATE 20 MG/ML
2 INJECTION, SOLUTION INTRAVENOUS ONCE
Status: COMPLETED | OUTPATIENT
Start: 2023-02-18 | End: 2023-02-18

## 2023-02-18 RX ORDER — DEXTROSE MONOHYDRATE 25 G/50ML
25 INJECTION, SOLUTION INTRAVENOUS
Status: DISCONTINUED | OUTPATIENT
Start: 2023-02-18 | End: 2023-02-21 | Stop reason: HOSPADM

## 2023-02-18 RX ORDER — INSULIN LISPRO 100 [IU]/ML
0-9 INJECTION, SOLUTION INTRAVENOUS; SUBCUTANEOUS
Status: DISCONTINUED | OUTPATIENT
Start: 2023-02-18 | End: 2023-02-20

## 2023-02-18 RX ORDER — DIPHENHYDRAMINE HYDROCHLORIDE 50 MG/ML
50 INJECTION INTRAMUSCULAR; INTRAVENOUS ONCE AS NEEDED
Status: DISCONTINUED | OUTPATIENT
Start: 2023-02-18 | End: 2023-02-21 | Stop reason: HOSPADM

## 2023-02-18 RX ORDER — GABAPENTIN 100 MG/1
200 CAPSULE ORAL EVERY 12 HOURS SCHEDULED
Status: DISCONTINUED | OUTPATIENT
Start: 2023-02-18 | End: 2023-02-18

## 2023-02-18 RX ORDER — FAMOTIDINE 10 MG/ML
20 INJECTION, SOLUTION INTRAVENOUS ONCE AS NEEDED
Status: DISCONTINUED | OUTPATIENT
Start: 2023-02-18 | End: 2023-02-21 | Stop reason: HOSPADM

## 2023-02-18 RX ADMIN — PANTOPRAZOLE SODIUM 40 MG: 40 TABLET, DELAYED RELEASE ORAL at 06:52

## 2023-02-18 RX ADMIN — AMIODARONE HYDROCHLORIDE 1 MG/MIN: 1.8 INJECTION, SOLUTION INTRAVENOUS at 05:40

## 2023-02-18 RX ADMIN — OXYCODONE 10 MG: 5 TABLET ORAL at 04:01

## 2023-02-18 RX ADMIN — MUPIROCIN 1 APPLICATION: 20 OINTMENT TOPICAL at 08:32

## 2023-02-18 RX ADMIN — MUPIROCIN 1 APPLICATION: 20 OINTMENT TOPICAL at 20:30

## 2023-02-18 RX ADMIN — ENOXAPARIN SODIUM 40 MG: 100 INJECTION SUBCUTANEOUS at 18:11

## 2023-02-18 RX ADMIN — GUAIFENESIN 1200 MG: 600 TABLET, EXTENDED RELEASE ORAL at 20:30

## 2023-02-18 RX ADMIN — SCOPALAMINE 1 PATCH: 1 PATCH, EXTENDED RELEASE TRANSDERMAL at 07:53

## 2023-02-18 RX ADMIN — LIDOCAINE HYDROCHLORIDE 2 MG/MIN: 4 INJECTION, SOLUTION INTRAVENOUS at 06:58

## 2023-02-18 RX ADMIN — OXYCODONE 10 MG: 5 TABLET ORAL at 21:47

## 2023-02-18 RX ADMIN — MAGNESIUM SULFATE HEPTAHYDRATE 2 G: 2 INJECTION, SOLUTION INTRAVENOUS at 04:42

## 2023-02-18 RX ADMIN — HYDROCODONE BITARTRATE AND ACETAMINOPHEN 2 TABLET: 5; 325 TABLET ORAL at 17:02

## 2023-02-18 RX ADMIN — FOLIC ACID 1 MG: 5 INJECTION, SOLUTION INTRAMUSCULAR; INTRAVENOUS; SUBCUTANEOUS at 12:17

## 2023-02-18 RX ADMIN — KETOROLAC TROMETHAMINE 15 MG: 30 INJECTION, SOLUTION INTRAMUSCULAR; INTRAVENOUS at 08:32

## 2023-02-18 RX ADMIN — IMMUNE GLOBULIN (HUMAN) 20 G: 10 INJECTION INTRAVENOUS; SUBCUTANEOUS at 15:28

## 2023-02-18 RX ADMIN — DIPHENHYDRAMINE HYDROCHLORIDE 25 MG: 25 CAPSULE ORAL at 15:49

## 2023-02-18 RX ADMIN — METOCLOPRAMIDE 10 MG: 5 INJECTION, SOLUTION INTRAMUSCULAR; INTRAVENOUS at 06:52

## 2023-02-18 RX ADMIN — ASPIRIN 81 MG: 81 TABLET, COATED ORAL at 08:32

## 2023-02-18 RX ADMIN — CHLORHEXIDINE GLUCONATE 0.12% ORAL RINSE 15 ML: 1.2 LIQUID ORAL at 08:32

## 2023-02-18 RX ADMIN — IMMUNE GLOBULIN (HUMAN) 10 G: 10 INJECTION INTRAVENOUS; SUBCUTANEOUS at 15:29

## 2023-02-18 RX ADMIN — CEFAZOLIN SODIUM 2 G: 2 INJECTION, SOLUTION INTRAVENOUS at 00:12

## 2023-02-18 RX ADMIN — GUAIFENESIN 1200 MG: 600 TABLET, EXTENDED RELEASE ORAL at 08:31

## 2023-02-18 RX ADMIN — ALBUMIN HUMAN 250 ML: 0.05 INJECTION, SOLUTION INTRAVENOUS at 04:42

## 2023-02-18 RX ADMIN — CYCLOBENZAPRINE 10 MG: 10 TABLET, FILM COATED ORAL at 20:30

## 2023-02-18 RX ADMIN — ONDANSETRON 4 MG: 2 INJECTION INTRAMUSCULAR; INTRAVENOUS at 00:20

## 2023-02-18 RX ADMIN — CALCIUM GLUCONATE 2 G: 20 INJECTION, SOLUTION INTRAVENOUS at 07:52

## 2023-02-18 RX ADMIN — FUROSEMIDE 40 MG: 10 INJECTION, SOLUTION INTRAMUSCULAR; INTRAVENOUS at 07:52

## 2023-02-18 RX ADMIN — DOCUSATE SODIUM 50MG AND SENNOSIDES 8.6MG 2 TABLET: 8.6; 5 TABLET, FILM COATED ORAL at 20:30

## 2023-02-18 RX ADMIN — ACETAMINOPHEN 1000 MG: 10 INJECTION, SOLUTION INTRAVENOUS at 04:01

## 2023-02-18 RX ADMIN — CHLORHEXIDINE GLUCONATE 0.12% ORAL RINSE 15 ML: 1.2 LIQUID ORAL at 21:06

## 2023-02-18 RX ADMIN — CEFAZOLIN SODIUM 2 G: 2 INJECTION, SOLUTION INTRAVENOUS at 17:01

## 2023-02-18 RX ADMIN — ACETAMINOPHEN 650 MG: 325 TABLET ORAL at 15:50

## 2023-02-18 RX ADMIN — CEFAZOLIN SODIUM 2 G: 2 INJECTION, SOLUTION INTRAVENOUS at 09:50

## 2023-02-18 NOTE — PROGRESS NOTES
Subjective  1/ mild thrombocytopenia after mitral valve replacement, pt has had sepsis before and had DIC AND PLAT COUNT OF 20 THOUSAND 3 YEARS AGO, CHRONIC LOW LEVEL THROMBOCYTOPENIA.     2. AS A CHILD MULTIPLE HOSPITAL ADMISSIONS FOR REPETITIVE INFECTIONS: QUESTION COMBINED IMMUNODEFICIENCY.     3. OSTEOPOROSIS AS A CHILD AND FRACTURED BONES: WHAT KIND OF GENETIC BONE DISORDER IN A PATIENT WITH MARFAN LIKE SYNDROME?.     4. QUESTION RHEUMATIC FEVER . HEART MURMUR PRESENT ONLY  AT AGE 11 AFTER MULTIPLE INFECTIONS.        History of Present Illness  On 02/17/2023 this 37-year-old male has been admitted to the hospital the day before to proceed with mitral valve replacement therapy by Dr. Gann that has been accomplished this morning. We have been consulted because the patient has mild thrombocytopenia. It seems according to the patient's mother with whom I have had an extensive discussion today that he has had thrombocytopenia in the past on multiple occasions, the most severe one 3 years ago when he developed septicemia as a complication of a tooth abscess and ended up unconscious at home and taken to the hospital with septic syndrome requiring multiple blood products administration, mechanical intubation with resuscitation and aggressive antibiotic therapy with resolution.      The patient has had in laboratory testing done before and available and posted below mild degree of variable thrombocytopenia.      Talking to the patient's mother as well the patient had multiple infections as an infant until he was 5 years old with multiple admissions to the hospital with respiratory infections of all sorts. He was in and out of the hospital all the time. Besides this a murmur was detected in his heart at age 11 and when I mentioned rheumatic fever the patient's mother opened her eyes and said that she probably was that he had this condition then. Obviously raises the question if the murmur is related to a genetic  defect or things of this nature or was acquired condition. In any event, he has undergone the replacement of this.      The patient's mother also mentions the fact that the patient was diagnosed with osteoporosis as a child having multiple bone fractures with no major trauma to trigger the phenomenon. This is not enough to call this in my knowledge, osteogenesis imperfecta. 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   On 02/18/2023 the patient was further reviewed. Since yesterday he has been extubated and now he is sitting in a recliner chair. He has not had any external bleeding. I discussed the case with Matthew Gann MD, who mentioned that before the patient's surgery was completed he bled, bled and bled through surgical sites in multiple areas. He received multiple blood products finally stopping bleeding. Raises the question about platelet dysfunction. His PT and PTT before the surgery were normal.     Today besides this the patient has been able to talk, he has expected pain, he has no nausea or vomiting, his vital signs are stable. His phosphorus has been rechecked after phosphorus infusion yesterday, now is normal but this will require to be checked on daily basis. He eventually required the initiation of vitamin D that also was low.    ·   Past Medical History, Past Surgical History, Social History, Family History have been reviewed and are without significant changes except as mentioned.    Review of Systems   Constitutional: Positive for activity change and fatigue.   HENT: Negative.    Respiratory: Positive for cough and shortness of breath.    Cardiovascular: Negative.    Gastrointestinal: Negative.    Genitourinary: Negative.    Musculoskeletal: Negative.    Skin: Negative.    Neurological: Negative.    Hematological: Negative.    Psychiatric/Behavioral: Negative.         Medications:  The current  medication list was reviewed in the EMR    ALLERGIES:  No Known Allergies    Objective      Vitals:    02/18/23 0800 02/18/23 0900 02/18/23 1100 02/18/23 1200   BP: 113/69 107/76 103/66 98/65   BP Location: Right arm Right arm Right arm Right arm   Patient Position: Sitting Sitting Sitting Sitting   Pulse: 88 88 88 88   Resp: 16 16 16 18   Temp: 96.6 °F (35.9 °C) 95.7 °F (35.4 °C) 97.2 °F (36.2 °C) 97.3 °F (36.3 °C)   TempSrc: Core Core Bladder Bladder   SpO2: 100% 100% 99% 98%   Weight:       Height:         Current Status 1/18/2022   ECOG score 0       Physical Exam  Constitutional:       Appearance: He is ill-appearing.   Eyes:      General: No scleral icterus.  Cardiovascular:      Rate and Rhythm: Normal rate and regular rhythm.      Pulses: Normal pulses.      Heart sounds: Normal heart sounds. No murmur heard.    No gallop.   Pulmonary:      Effort: Pulmonary effort is normal. No respiratory distress.      Breath sounds: Normal breath sounds. No wheezing or rales.   Abdominal:      General: Abdomen is flat. There is no distension.      Tenderness: There is no abdominal tenderness. There is no guarding or rebound.   Musculoskeletal:      Right lower leg: No edema.      Left lower leg: No edema.   Lymphadenopathy:      Cervical: No cervical adenopathy.   Skin:     General: Skin is warm and dry.   Neurological:      Mental Status: He is alert. Mental status is at baseline.           RECENT LABS:  Hematology WBC   Date Value Ref Range Status   02/18/2023 30.58 (C) 3.40 - 10.80 10*3/mm3 Final     RBC   Date Value Ref Range Status   02/18/2023 3.75 (L) 4.14 - 5.80 10*6/mm3 Final     Hemoglobin   Date Value Ref Range Status   02/18/2023 11.2 (L) 13.0 - 17.7 g/dL Final     Hematocrit   Date Value Ref Range Status   02/18/2023 32.5 (L) 37.5 - 51.0 % Final     Platelets   Date Value Ref Range Status   02/18/2023 141 140 - 450 10*3/mm3 Final          Assessment & Plan    On 02/17/2023 the patient has very mild degree of  thrombocytopenia, 125,000 and 89,000 but reviewing the platelet count during the last 3 years there is always a fluctuation in the number most of the time. It raises the question if this patient has some chronic phenomenon more than acute phenomenon just exacerbated by his recent heart operation. The surgical site is completely dry. The catheter and all the lines are completely dry. He has no clinical bleeding and this will be further investigated with an immature platelet fraction. I doubt that LDH is going to be of benefit at this point after such a major intervention. It is going to be some degree of hemolysis anyway and LDH is not going to provide any information. I think it is worth it to do a B12 and folic acid levels.      Eventually in the future when the patient is better we would like to do at least a CT scan of the abdomen to evaluate spleen and liver and see if there is any evidence of chronic splenomegaly. The clinical examination makes me to think that maybe indeed the spleen is mildly enlarged but I am not compromising with that physical finding.      Therefore, the platelet count will be watched in absence of any other intervention. Obviously with this kind of number and no bleeding there is no need for blood products.   On further questioning and discussion with Matthew Gann MD, on 02/18/2023 he documented that after completion of the surgery the patient continued bleeding, bleeding and oozing from multiple sites. He received multiple blood products, finally the bleeding stopped. That raises the question if the patient has some sort of narrative factor that provides him with coagulation issues. I think these numbers eventually will need to be rechecked in a baseline situation in the future. Today he has no external bleeding at the surgical site and none of the lines are bleeding. He has no external bleeding anywhere. I think it is worth it to check platelet aggregation studies.    ·   2.  The  patient according to the mother had multiple admissions to the hospital as an infant to the point that he used to live in the hospital most of the time in and out and when I mentioned issues pertinent to rheumatic fever she opened her eyes and said likely he had this condition. In any event, this patient is now 37 years old and obviously raises the question given the morphological alterations if this patient has some sort of immunodeficiency and severe combined immunodeficiency. I think it is even worth it to check a level if immunoglobulin G, A and M for baseline.      The patient's white count, white count differential were normal.    On 02/18/2023 it is obvious that the patient has had multiple infections as a child. 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 I think he will benefit from immunoglobulin dose especially now that he is postsurgical time.    ·   3.  The other issue that this patient has had is multiple fractures when he was a young age with no major trauma to trigger this. The patient's mother states that he was called osteoporotic then and raises the question about the nature of this and if he has some form of congenital metabolic bone disorder of some sort not bad enough to call this osteogenesis imperfecta but enough to trigger this phenomenon. I think it is worth it to at least to do a vitamin D, magnesium, phosphorus level and a PTH.   On 02/18/2023 it is very obvious that the patient had hypophosphatemia, also low level of vitamin D. His phosphate was replaced yesterday. The phosphate level today is normal. He will require eventually initiation of vitamin D supplementation. This will require to be reassessed as well in the future. His PTH was minimally elevated and I think this could obey also to the fact that he has some element of hypocalcemia.     ·      4.  On 02/17/2023  this patient was further reviewed in regard to so many other issues. It caught my attention his very long fingers in his hand. He had hyperelasticity according to the mother when he was a young child. 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 Raises the question if he has Marfanoid issues and eventually he needs to be seen by genetics in regard to analysis of this situation. This could be eventually scheduled.      His mother was actually very appreciative of my conversation with her and the interaction in regard to these other issues that are not precisely pertinent to his thrombocytopenia but had issues that needed to be addressed in some way or the other. 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  On 02/18/2023 the patient eventually will require real global assessment with endocrinological workup and genetics referral.     ·   5.  Further reviewing this patient he has a very low vitamin D level that will require to have correction orally once that he is able to receive food and medication by mouth. The patient has profound hypophosphatemia. This could have a lot to do with issues in regard to metabolism and furthermore this degree of hypophosphatemia can lead into hemolytic anemia because of lack of energy in the red cell membrane to keep integrity of the red corpuscle. He will require aggressive phosphate replacement therapy as soon as now. Furthermore the patient has very significant folate deficiency and his level of vitamin B12 is very marginal. I will initiate replacement therapy for this.      All these issues are very striking. His PTH level is also elevated and this probably announced the need for this patient eventually to be seen also at Endocrinology. They do not do inpatient hospital visits anymore.   On 02/18/2023 the patient  received yesterday during the surgical intervention Solu-Medrol IV. Today his white count is 30,000, I think this is a reflection of steroid stimulus into the release of marginal population of neutrophils into the peripheral blood. Therefore this per se is not an indicator of infection or anything else, neither leukemia. The hemoglobin remains stable, the platelet count is 141,000 today.    On 02/18/2023 I discussed the case with Matthew Gann MD.         DURING THE VISIT WITH THE PATIENT TODAY , PATIENT HAD FACE MASK, I HAD PROPER PROTECTIVE EQUIPMENT, AND I DID HAND HYGIENE WITH SOAP AND WATER BEFORE AND AFTER THE VISIT.                   2/18/2023      CC:

## 2023-02-18 NOTE — PROGRESS NOTES
" LOS: 1 day   Patient Care Team:  Donal Hutchison MD as PCP - General (Internal Medicine)  ADRIANNA Bernard MD as Consulting Physician (Cardiology)    Chief Complaint: post op    Subjective:  Symptoms:  He reports chest pain.  No shortness of breath.    Diet:  Poor intake.  He reports  nausea and vomiting.    Activity level: Impaired due to pain.    Pain:  He complains of pain that is moderate.  Pain is partially controlled.      Vital Signs  Temp:  [96.1 °F (35.6 °C)-98.8 °F (37.1 °C)] 97.2 °F (36.2 °C)  Heart Rate:  [80-89] 88  Resp:  [12-18] 16  BP: ()/(42-84) 103/72  Arterial Line BP: ()/(46-72) 91/63  FiO2 (%):  [39 %-99 %] 39 %  Body mass index is 21.09 kg/m².    Intake/Output Summary (Last 24 hours) at 2/18/2023 0659  Last data filed at 2/18/2023 0600  Gross per 24 hour   Intake 5846.3 ml   Output 4620 ml   Net 1226.3 ml     I/O this shift:  In: 1410 [P.O.:180; I.V.:730; IV Piggyback:500]  Out: 725 [Urine:515; Emesis/NG output:100; Chest Tube:110]    Chest tube drainage last 8 hours: 50        02/17/23  0559 02/18/23  0600   Weight: 83 kg (183 lb) 82.8 kg (182 lb 8.7 oz)         Objective:  General Appearance:  Comfortable and in no acute distress.    Vital signs: (most recent): Blood pressure 103/72, pulse 88, temperature 97.2 °F (36.2 °C), resp. rate 16, height 198.1 cm (78\"), weight 82.8 kg (182 lb 8.7 oz), SpO2 100 %.  Vital signs are normal.  No fever.    Output: Producing urine and no stool output.    Lungs:  Normal effort and normal respiratory rate.  There are rales and decreased breath sounds.    Heart: Normal rate.  Regular rhythm.  (100% AV paced. SR/SB in 50s underlying)  Abdomen: Abdomen is soft.  Hypoactive bowel sounds.     Pulses: Distal pulses are intact.    Neurological: Patient is alert and oriented to person, place and time.    Skin:  Warm and dry.  (Sternal dressing clean, dry, and intact)      Results Review:        WBC WBC   Date Value Ref Range Status   02/18/2023 30.58 (C) " 3.40 - 10.80 10*3/mm3 Final   02/17/2023 27.78 (H) 3.40 - 10.80 10*3/mm3 Final   02/17/2023 19.24 (H) 3.40 - 10.80 10*3/mm3 Final   02/17/2023 5.72 3.40 - 10.80 10*3/mm3 Final   02/17/2023 3.45 3.40 - 10.80 10*3/mm3 Final   02/15/2023 6.92 3.40 - 10.80 10*3/mm3 Final      HGB Hemoglobin   Date Value Ref Range Status   02/18/2023 11.2 (L) 13.0 - 17.7 g/dL Final   02/17/2023 11.8 (L) 13.0 - 17.7 g/dL Final   02/17/2023 12.8 (L) 13.0 - 17.7 g/dL Final   02/17/2023 12.5 (L) 13.0 - 17.7 g/dL Final   02/17/2023 12.4 (L) 13.0 - 17.7 g/dL Final   02/17/2023 11.6 (L) 12.0 - 17.0 g/dL Final   02/17/2023 12.2 12.0 - 17.0 g/dL Final   02/17/2023 13.3 12.0 - 17.0 g/dL Final   02/17/2023 11.9 (L) 12.0 - 17.0 g/dL Final   02/17/2023 12.9 12.0 - 17.0 g/dL Final   02/15/2023 15.8 13.0 - 17.7 g/dL Final      HCT Hematocrit   Date Value Ref Range Status   02/18/2023 32.5 (L) 37.5 - 51.0 % Final   02/17/2023 33.8 (L) 37.5 - 51.0 % Final   02/17/2023 36.3 (L) 37.5 - 51.0 % Final   02/17/2023 37.1 (L) 37.5 - 51.0 % Final   02/17/2023 36.5 (L) 37.5 - 51.0 % Final   02/17/2023 34 (L) 38 - 51 % Final   02/17/2023 36 (L) 38 - 51 % Final   02/17/2023 39 38 - 51 % Final   02/17/2023 35 (L) 38 - 51 % Final   02/17/2023 38 38 - 51 % Final   02/15/2023 46.8 37.5 - 51.0 % Final      Platelets Platelets   Date Value Ref Range Status   02/18/2023 141 140 - 450 10*3/mm3 Final   02/17/2023 140 140 - 450 10*3/mm3 Final   02/17/2023 122 (L) 140 - 450 10*3/mm3 Final   02/17/2023 125 (L) 140 - 450 10*3/mm3 Final   02/17/2023 125 (L) 140 - 450 10*3/mm3 Final   02/17/2023 89 (L) 140 - 450 10*3/mm3 Final   02/15/2023 145 140 - 450 10*3/mm3 Final        PT/INR:    Protime   Date Value Ref Range Status   02/17/2023 17.9 (H) 11.7 - 14.2 Seconds Final   02/17/2023 17.4 (H) 11.7 - 14.2 Seconds Final   02/17/2023 17.9 (H) 12.8 - 15.2 seconds Final     Comment:     Serial Number: [id number]:  [id number]   02/17/2023 18.8 (H) 11.7 - 14.2 Seconds Final   02/15/2023  14.2 11.7 - 14.2 Seconds Final   /  INR   Date Value Ref Range Status   02/17/2023 1.46 (H) 0.90 - 1.10 Final   02/17/2023 1.41 (H) 0.90 - 1.10 Final   02/17/2023 1.5 (H) 0.8 - 1.2 Final   02/17/2023 1.55 (H) 0.90 - 1.10 Final   02/15/2023 1.08 0.90 - 1.10 Final       Sodium Sodium   Date Value Ref Range Status   02/18/2023 137 136 - 145 mmol/L Final   02/17/2023 134 (L) 136 - 145 mmol/L Final   02/17/2023 140 136 - 145 mmol/L Final   02/17/2023 139 136 - 145 mmol/L Final   02/15/2023 138 136 - 145 mmol/L Final      Potassium Potassium   Date Value Ref Range Status   02/18/2023 5.3 (H) 3.5 - 5.2 mmol/L Final   02/17/2023 5.2 3.5 - 5.2 mmol/L Final   02/17/2023 3.8 3.5 - 5.2 mmol/L Final   02/17/2023 4.2 3.5 - 5.2 mmol/L Final     Comment:     Slight hemolysis detected by analyzer. Results may be affected.   02/15/2023 4.2 3.5 - 5.2 mmol/L Final      Chloride Chloride   Date Value Ref Range Status   02/18/2023 107 98 - 107 mmol/L Final   02/17/2023 106 98 - 107 mmol/L Final   02/17/2023 110 (H) 98 - 107 mmol/L Final   02/17/2023 109 (H) 98 - 107 mmol/L Final   02/15/2023 104 98 - 107 mmol/L Final      Bicarbonate CO2   Date Value Ref Range Status   02/18/2023 23.0 22.0 - 29.0 mmol/L Final   02/17/2023 21.8 (L) 22.0 - 29.0 mmol/L Final   02/17/2023 22.0 22.0 - 29.0 mmol/L Final   02/17/2023 22.0 22.0 - 29.0 mmol/L Final   02/15/2023 28.2 22.0 - 29.0 mmol/L Final      BUN BUN   Date Value Ref Range Status   02/18/2023 9 6 - 20 mg/dL Final   02/17/2023 9 6 - 20 mg/dL Final   02/17/2023 9 6 - 20 mg/dL Final   02/17/2023 10 6 - 20 mg/dL Final   02/15/2023 11 6 - 20 mg/dL Final      Creatinine Creatinine   Date Value Ref Range Status   02/18/2023 0.93 0.76 - 1.27 mg/dL Final   02/17/2023 0.94 0.76 - 1.27 mg/dL Final   02/17/2023 0.93 0.76 - 1.27 mg/dL Final   02/17/2023 0.98 0.76 - 1.27 mg/dL Final   02/15/2023 1.01 0.76 - 1.27 mg/dL Final      Calcium Calcium   Date Value Ref Range Status   02/18/2023 7.9 (L) 8.6 - 10.5  mg/dL Final   02/17/2023 7.9 (L) 8.6 - 10.5 mg/dL Final   02/17/2023 8.6 8.6 - 10.5 mg/dL Final   02/17/2023 8.0 (L) 8.6 - 10.5 mg/dL Final   02/15/2023 9.1 8.6 - 10.5 mg/dL Final      Magnesium Magnesium   Date Value Ref Range Status   02/18/2023 2.5 1.6 - 2.6 mg/dL Final   02/17/2023 3.0 (H) 1.6 - 2.6 mg/dL Final   02/17/2023 2.0 1.6 - 2.6 mg/dL Final   02/17/2023 2.5 1.6 - 2.6 mg/dL Final   02/15/2023 2.0 1.6 - 2.6 mg/dL Final          aspirin, 81 mg, Oral, Daily  calcium gluconate, 2 g, Intravenous, Once  ceFAZolin, 2 g, Intravenous, Q8H  chlorhexidine, 15 mL, Mouth/Throat, Q12H  enoxaparin, 40 mg, Subcutaneous, Daily  folic acid (FOLVITE) IVPB, 1 mg, Intravenous, Daily  furosemide, 40 mg, Intravenous, Once  magnesium sulfate, 2 g, Intravenous, Q8H  metoclopramide, 10 mg, Intravenous, Q6H  metoprolol tartrate, 12.5 mg, Oral, Q12H  mupirocin, , Each Nare, BID  pantoprazole, 40 mg, Intravenous, Once   Followed by  pantoprazole, 40 mg, Oral, QAM  senna-docusate sodium, 2 tablet, Oral, Nightly      amiodarone, 1 mg/min, Last Rate: 1 mg/min (02/18/23 0540)  clevidipine, 2-32 mg/hr  dexmedetomidine, 0.2-1.5 mcg/kg/hr, Last Rate: Stopped (02/17/23 2877)  DOPamine, 2-20 mcg/kg/min  EPINEPHrine, 0.02-0.1 mcg/kg/min  insulin, 0-100 Units/hr, Last Rate: 0.6 Units/hr (02/18/23 0649)  lidocaine cardiac, 2 mg/min, Last Rate: 2 mg/min (02/18/23 0658)  milrinone, 0.25-0.375 mcg/kg/min  niCARdipine, 5-15 mg/hr, Last Rate: Stopped (02/17/23 1130)  nitroglycerin, 5-200 mcg/min  norepinephrine, 0.02-0.2 mcg/kg/min, Last Rate: 0.03 mcg/kg/min (02/18/23 0300)  phenylephrine, 0.2-2 mcg/kg/min, Last Rate: Stopped (02/17/23 2100)  propofol, 5-50 mcg/kg/min, Last Rate: Stopped (02/17/23 1519)  sodium chloride, 30 mL/hr, Last Rate: 30 mL/hr (02/17/23 1151)        Patient Active Problem List   Diagnosis Code   • VHD (valvular heart disease) I38   • Marfan syndrome Q87.40   • Severe mitral regurgitation I34.0   • Bicuspid aortic valve Q23.1    • Dyspnea on effort R06.09   • Chest pain, atypical R07.89   • Mitral valve insufficiency, unspecified etiology I34.0       Assessment & Plan   - Severe mitral valve regurgitation s/p MV repair POD#1 Kaykayni  - Marfan syndrome  - bicuspid aortic valve with mild insufficiency  - ITP; hematology consulted  - post op anemia--expected acute blood loss  - leukocytosis--reactive/intraoperative steroids    Looks good this morning, sitting up in the chair  With nausea this morning--will add scopolamine patch  On 0.03 of levophed, replete calcium and wean gtt as able  CI at 0300 was 2.6. Will recheck on now and if stable than discontinue swan  With a lot of ventricular ectopy yesterday immediately after surgery. Placed on amiodarone and lidocaine gtts with improvement. RN reports minimal ectopy overnight. Will decrease amio to 05 and stop lidocaine. He remains AV paced at this time, SR in the 50s underlying  On 3L NC--wean as able  Platelet count stable this morning. Appreciate input from hematology  Mobilize/encourage pulmonary toilet--add mucinex  CXR with vascular congestions. CVP 16--gentle IV diuresis  Stable right PTX. Still with intermittent air leak  Possible transfer to stepdown this afternoon if able to wean gtt  Continue routine care      Lindsey Jung, GUILLERMINA  02/18/23  06:59 EST

## 2023-02-18 NOTE — OP NOTE
Operative Note    Date of Dictation: 02/18/23    Date of Procedure: 02/17/23    Referring Physician: Wilmar Bernard MD    Preoperative diagnosis:   1.  Severe mitral regurgitation with prolapse  2.  Degenerative mitral valve disease  3.  Patent foramen ovale  4.  Mild aortic regurgitation  5.  Dyspnea of exertion  6.  Thrombocytopenia and presumably ITP  7.  Rule out hypogammaglobulinemia  8.  Presumed Marfan syndrome but no genetic testing    Postoperative diagnosis:   Same    Procedure:   1.  Complex mitral valve repair with a 42 mm Medtronic flexible band and chordal repair of A2 segment (4-0 [place]ne x2) (CPT code 49994)  2.  Primary closure of patent foramen ovale (CPT code 32531)    Surgeon: Matthew Gann MD     Assistants: LISA De La Fuente Assistant: Epi Cochran PA-C was responsible for performing the following activities: Retraction, Suction, Irrigation, Suturing, Closing, Placing Dressing and all aspects of the surgical case and their skilled assistance was necessary for the success of this case.    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 The antibiotics and B blockers were given in the STS required window.  Counseling was given about diet, alcohol and tobacco use as needed.  End-of-life options were discussed          Description of the procedure:     The patient was placed supine on the operative table. Anesthesia was given and lines placed. The patient was prepped and draped using the usual sterile technique. A median sternotomy was performed with a scalpel and the layers carried down to  the sternum using the electrocautery. The sternum was split in the midline using a vertical oscillating saw. Hemostasis was achieved. A Malvin retractor was placed and the anterior mediastinum was exposed. The pericardium was opened and edges tacked to the wound.  300 units of IV heparin was given to maintain the ACT over 400.  4-0 Prolene cannulation sutures were placed in the ascending aorta and right atrium. Small tip cannulas were placed and aorto right atrial cardiopulmonary bypass was started. Cardioplegia cannulas were placed and then the ascending aorta was clamped. One liter of cold blood cardioplegia was given in an antegrade fashion to achieve diastolic arrest and further doses every 15 minutes thereafter also using retrograde infusion.  The left atrium was opened the right interatrial groove and incision was extended below each cava.  Self retracting blades were placed.  A large PFO was detected and was also detected in the JEFFERSON.  I closed the defect using a double layer of 4-0 Prolene continuous suture.  I systematically evaluated the mitral valve and I identified prolapse of the P2 segment with ruptured chordae.  The valve was myxomatous with a large anterior leaflet.  In my opinion it was repairable.    MV REPAIR:  The mitral valve was systematically evaluated to identify the etiology using valve hooks. The problem was Prolapse of the anterior leaflet. Then, 2.0 Ethibond suture were placed in a plicating fashion From trigone to trigone around the annulus. A ring was selected by measuring the inter-trigonal distance and the AP distance of the anterior leaflet. The sutures were passed through the42 mm ATS flexible band (Medtronic) and the knots secured. The valve was tested by injecting saline and no residual leak was seen. Then the atrium was closed using a 3.0 prolene running sutures, the chamber de-aired and suture tied down.  Of note, I used the core knot device to secure the knots.  The patient  was systemically rewarmed, I gave the warm dose of cardioplegia and then completion I remove the aortic root slang with steep suction on the aortic vent. The left pleural space was suctioned and the lungs ventilated. The heart was paced till regular atrial rhythm resumed. I allowed the heart to eject  and I de-aired the left heart chambers under JEFFERSON guidance and once hemodynamics were acceptable, then the CPB was discontinued and the venous and cardioplegia cannulas removed. The matching dose of protamine was given and the aortic cannula removed as well. AV temporary wires and pleural and mediastinal chest tubes were placed and the wound sprayed with platelet rich plasma. The perioperative JEFFERSON showed the ring well seated without leaks. The sternum was closed with single and double wires and soft tissue in layers of reabsorbable material. The wounds were covered with sterile dressings.       Specimen removed: None    CPB time: 81 minutes    Aortic clamp time: 53 minutes    Complications:  none           Disposition: Cardiovascular recovery room           Condition: Critical but stable.

## 2023-02-18 NOTE — CONSULTS
Date of Consultation: 23    Referral Provider: Matthew Gann MD     Reason for Consultation: Postoperative ventricular tachycardia.    Encounter Provider: Christiano David MD    Group of Service: [facility]     Patient Name: Regino Fox    :1985    Chief complaint: Elective mitral valve repair.    History of Present Illness:      This is a 37 year-old male who is normally followed by Dr. Wilmar Bernard from cardiology.  The patient had known mitral regurgitation, and had developed progressive dyspnea on exertion and fatigue.  He was found to have severe eccentric mitral regurgitation.  He also has a known bicuspid aortic valve and a history of either a TIA or small stroke in the past.  He was admitted for an elective mitral valve repair with Dr. Gann.  He underwent a successful surgery earlier today.  Postoperatively, he has had multiple runs of ventricular tachycardia, some appear to be polymorphic in nature.  He is now on a lidocaine drip, as well as an amiodarone drip.  His rhythm is better controlled.  He is currently in sinus rhythm with occasional pacing.  He had just been extubated when I went to see him, and he was still very somnolent and groggy from this.    Past Medical History:   Diagnosis Date   • Asthma    • Bicuspid aortic valve    • Broken ankle, left, sequela     HX   • CHF (congestive heart failure) (HCC)    • Congenital heart disease    • Dyspnea on exertion    • Heart murmur    • Heart valve disease    • History of migraine    • Hypertension     QUIT TAKING MEDS AGE 19 DUE TO INSURANCE REASONS   • Marfan syndrome    • Mitral valve prolapse    • Scoliosis    • TIA (transient ischemic attack)     HX         Past Surgical History:   Procedure Laterality Date   • FEMUR SURGERY Right     X2 FOR FRACTURE AGE 11   • LYMPH NODE DISSECTION      NECK   • TRANSESOPHAGEAL ECHOCARDIOGRAM (JEFFERSON) N/A 2022    Procedure: TRANSESOPHAGEAL ECHOCARDIOGRAM;  Surgeon:  "ADRIANNA Bernard MD;  Location: [facility];  Service: Cardiology;  Laterality: N/A;         No Known Allergies      No current facility-administered medications on file prior to encounter.     No current outpatient medications on file prior to encounter.         Social History     Socioeconomic History   • Marital status: Single   Tobacco Use   • Smoking status: Former     Types: Cigarettes     Quit date: 2020     Years since quitting: 3.1   • Smokeless tobacco: Never   • Tobacco comments:     MORE OF SOCIAL SMOKER FOR 8 YEARS   Vaping Use   • Vaping Use: Every day   • Substances: Nicotine, Flavoring   • Devices: Disposable   Substance and Sexual Activity   • Alcohol use: Yes     Comment: rarely   • Drug use: Never   • Sexual activity: Defer         Family History   Problem Relation Age of Onset   • Aortic aneurysm Mother    • Heart attack Mother    • No Known Problems Father    • Malig Hyperthermia Neg Hx        REVIEW OF SYSTEMS:   The patient is fairly somnolent from his open heart surgery.  A review of systems was not possible.     Objective:     Vitals:    02/17/23 1600 02/17/23 1700 02/17/23 1800 02/17/23 1900   BP: 109/84 95/77 98/70 102/71   BP Location: Right arm (P) Right arm Right arm    Patient Position: Lying (P) Lying Lying    Pulse: 85 85 85 82   Resp: 16 (P) 16 18 16   Temp: 98.8 °F (37.1 °C) 98.6 °F (37 °C) 98.2 °F (36.8 °C) 98.1 °F (36.7 °C)   TempSrc: Core (P) Core Core Core   SpO2: 97% 97% 98% 98%   Weight:       Height:         Body mass index is 21.15 kg/m².  Flowsheet Rows    Flowsheet Row First Filed Value   Admission Height 198.1 cm (78\") Documented at 02/17/2023 0559   Admission Weight 83 kg (183 lb) Documented at 02/17/2023 0559           General:    No acute distress, alert but somnolent (just recently extubated)                   Head:    Normocephalic, atraumatic.   Eyes:          Conjunctivae and sclerae normal, no icterus, PERRLA   Throat:   No oral lesions, no thrush, oral " mucosa moist.    Neck:   Supple, trachea midline.   Lungs:     Clear to auscultation bilaterally     Heart:    Regular rhythm and normal rate.  No murmurs, gallops, or rubs noted.   Abdomen:     Soft, non-tender, non-distended, positive bowel sounds.    Extremities:   No clubbing, cyanosis, or edema.     Pulses:   Pulses palpable and equal bilaterally.    Skin:   No bleeding or rash.   Neuro:   Non-focal.     Psychiatric:   Somnolent         Lab Review:                Results from last 7 days   Lab Units 02/17/23  1521   SODIUM mmol/L 140   POTASSIUM mmol/L 3.8   CHLORIDE mmol/L 110*   CO2 mmol/L 22.0   BUN mg/dL 9   CREATININE mg/dL 0.93   GLUCOSE mg/dL 169*   CALCIUM mg/dL 8.6         Results from last 7 days   Lab Units 02/17/23  1521   WBC 10*3/mm3 19.24*   HEMOGLOBIN g/dL 12.8*   HEMATOCRIT % 36.3*   PLATELETS 10*3/mm3 122*     Results from last 7 days   Lab Units 02/17/23  1118 02/17/23  0952 02/17/23  0945 02/15/23  0948   INR  1.41* 1.5* 1.55* 1.08   APTT seconds 31.4  --  31.3 28.7     Results from last 7 days   Lab Units 02/15/23  0948   CHOLESTEROL mg/dL 146     Results from last 7 days   Lab Units 02/17/23  1521   MAGNESIUM mg/dL 2.0     Results from last 7 days   Lab Units 02/15/23  0948   CHOLESTEROL mg/dL 146   TRIGLYCERIDES mg/dL 71   HDL CHOL mg/dL 47   LDL CHOL mg/dL 85       EKG (reviewed by me personally): Normal sinus rhythm, normal EKG.      Assessment:   1.  Symptomatic severe mitral regurgitation, status post mitral valve repair on 2/17/2023 by Dr. Gann  2.  Marfan syndrome  3.  Bicuspid aortic valve with mild aortic insufficiency  4.  History of either TIA or small stroke  5.  Postoperative ventricular tachycardia  6.  Chronic mild thrombocytopenia    Plan:       Again, his rhythm has come down quite a bit.  I did review some of the telemetry, and there were several episodes of polymorphic ventricular tachycardia.  He evidently was having quite a bit of VT when he first came out of the  operating room.  He is now on an amiodarone drip at 1 mg/min, as well as a lidocaine drip at 2 mg/min.  I think this should be fine for the lidocaine as his preoperative ejection fraction was normal.  He should be able to clear this adequately from his liver.  He also has already had magnesium replacement, which I agree with completely.    Continue current regimen and routine postoperative care.  We will continue to follow postoperatively.    Thank you very much for this consult.    Hesham David MD

## 2023-02-18 NOTE — PLAN OF CARE
Goal Outcome Evaluation:  Plan of Care Reviewed With: patient           Outcome Evaluation: Patient is a 36 yo male who is s/p MVR. PMHx inlcudes Marfan Syndrome. He lives with a friend in a second floor apartment and is ind at baseline. Today he presents with decreased activity tolerance, strength, and overall functional decline post op. He was [place] for STS and ambulated 30ft with HHA requiring minAx2. Pt also completed cardiac protocol x10 reps. He will continue to benefit from skilled PT to address functional deficits. Plan return home at [place].

## 2023-02-18 NOTE — PROGRESS NOTES
LOS: 1 day   Patient Care Team:  Donal Hutchison MD as PCP - General (Internal Medicine)  ADRIANNA Bernard MD as Consulting Physician (Cardiology)    Chief Complaint: Follow-up mitral valve repair, Marfan syndrome, postoperative ventricular tachycardia.    Interval History: Rhythm has calmed down significantly.  No further VT this morning.  Drips have been adjusted.  He feels weak, but no other acute events.  He is in sinus bradycardia under the pacemaker.    Vital Signs:  Temp:  [95.7 °F (35.4 °C)-98.8 °F (37.1 °C)] 97.3 °F (36.3 °C)  Heart Rate:  [81-88] 88  Resp:  [12-18] 18  BP: ()/(42-84) 98/65  Arterial Line BP: ()/(46-72) 99/59  FiO2 (%):  [39 %] 39 %    Intake/Output Summary (Last 24 hours) at 2/18/2023 1546  Last data filed at 2/18/2023 1200  Gross per 24 hour   Intake 4832.6 ml   Output 3640 ml   Net 1192.6 ml       Physical Exam:   General Appearance:    No acute distress, alert and oriented x4   Lungs:     Clear to auscultation bilaterally     Heart:    Regular rhythm and normal rate.  No murmurs, gallops, or       rubs.   Abdomen:     Soft, nontender, nondistended.    Extremities:   Moves all extremities well.  No clubbing, cyanosis, or edema.     Results Review:    Results from last 7 days   Lab Units 02/18/23  1209   SODIUM mmol/L 137   POTASSIUM mmol/L 4.5   CHLORIDE mmol/L 102   CO2 mmol/L 24.6   BUN mg/dL 10   CREATININE mg/dL 0.92   GLUCOSE mg/dL 127*   CALCIUM mg/dL 8.3*         Results from last 7 days   Lab Units 02/18/23  0310   WBC 10*3/mm3 30.58*   HEMOGLOBIN g/dL 11.2*   HEMATOCRIT % 32.5*   PLATELETS 10*3/mm3 141     Results from last 7 days   Lab Units 02/17/23  2204 02/17/23  1118 02/17/23  0952 02/17/23  0945 02/15/23  0948   INR  1.46* 1.41* 1.5* 1.55* 1.08   APTT seconds  --  31.4  --  31.3 28.7     Results from last 7 days   Lab Units 02/15/23  0948   CHOLESTEROL mg/dL 146     Results from last 7 days   Lab Units 02/18/23  0310   MAGNESIUM mg/dL 2.5     Results from last  7 days   Lab Units 02/15/23  0948   CHOLESTEROL mg/dL 146   TRIGLYCERIDES mg/dL 71   HDL CHOL mg/dL 47   LDL CHOL mg/dL 85       I reviewed the patient's new clinical results.        Assessment:  1.  Symptomatic severe mitral regurgitation, status post mitral valve repair on 2/17/2023 by Dr. Gann.  2.  Marfan syndrome  3.  Bicuspid aortic valve with mild aortic insufficiency  4.  History of either TIA or small stroke  5.  Postoperative ventricular tachycardia  6.  Chronic mild thrombocytopenia secondary to ITP  7.  Expected postoperative anemia  8.  Reactive leukocytosis secondary to intraoperative steroids    Plan:  -Doing much better from a rhythm standpoint.  The ventricular tachycardia has largely resolved.  Amiodarone decreased to 0.5 mg/min.  Lidocaine drip has been stopped.  QT interval okay on EKG.    -Sinus bradycardia underlying pacing.  Continue metoprolol 12.5 mg every 12 hours if blood pressure tolerates.    -Wean Levophed as tolerated.    -Hematology following for thrombocytopenia.  Platelet count 141 today.    -Lasix 40 mg IV given today.  Continue watch volume status.      Christiano David MD  02/18/23  15:46 EST

## 2023-02-18 NOTE — THERAPY EVALUATION
Patient Name: Regino Fox  : 1985    MRN: [id number]                              Today's Date: 2023       Admit Date: 2023    Visit Dx:     ICD-10-CM ICD-9-CM   1. Mitral valve insufficiency, unspecified etiology  I34.0 424.0     Patient Active Problem List   Diagnosis   • VHD (valvular heart disease)   • Marfan syndrome   • Severe mitral regurgitation   • Bicuspid aortic valve   • Dyspnea on effort   • Chest pain, atypical   • Mitral valve insufficiency, unspecified etiology     Past Medical History:   Diagnosis Date   • Asthma    • Bicuspid aortic valve    • Broken ankle, left, sequela     HX   • CHF (congestive heart failure) ([facility])    • Congenital heart disease    • Dyspnea on exertion    • Heart murmur    • Heart valve disease    • History of migraine    • Hypertension     QUIT TAKING MEDS AGE 19 DUE TO INSURANCE REASONS   • Marfan syndrome    • Mitral valve prolapse    • Scoliosis    • TIA (transient ischemic attack)     HX     Past Surgical History:   Procedure Laterality Date   • FEMUR SURGERY Right     X2 FOR FRACTURE AGE 11   • LYMPH NODE DISSECTION      NECK   • TRANSESOPHAGEAL ECHOCARDIOGRAM ([place]) N/A 2022    Procedure: TRANSESOPHAGEAL ECHOCARDIOGRAM;  Surgeon: ADRIANNA Bernard MD;  Location: [facility] ENDOSCOPY;  Service: Cardiology;  Laterality: N/A;      General Information     Row Name 23 1147          Physical Therapy Time and Intention    Document Type evaluation  -CB     Mode of Treatment individual therapy;physical therapy  -CB     Row Name 23 1147          General Information    Patient Profile Reviewed yes  -CB     Prior Level of Function independent:;gait;transfer;bed mobility;work;using stairs  -CB     Existing Precautions/Restrictions fall;cardiac;sternal  -CB     Barriers to Rehab none identified  -CB     Row Name 23 1147          Living Environment    People in Home friend(s)  -CB     Row Name 23 1147          Home Main Entrance    Number  of Stairs, Main Entrance --  second floor apartment  -CB     Row Name 02/18/23 1147          Cognition    Orientation Status (Cognition) oriented x 4  -CB     Row Name 02/18/23 1147          Safety Issues, Functional Mobility    Impairments Affecting Function (Mobility) endurance/activity tolerance;strength;pain;balance  -CB           User Key  (r) = Recorded By, (t) = Taken By, (c) = Cosigned By    Initials Name Provider Type    CB Angela López, PT Physical Therapist               Mobility     Row Name 02/18/23 1147          Bed Mobility    Comment, (Bed Mobility) UIC  -CB     Row Name 02/18/23 1147          Sit-Stand Transfer    Sit-Stand [place] (Transfers) minimum assist (75% patient effort);verbal cues  -CB     Row Name 02/18/23 1147          Gait/Stairs (Locomotion)    [place] Level (Gait) minimum assist (75% patient effort);2 person assist;verbal cues  -CB     Assistive Device (Gait) --  HHA  -CB     Distance in Feet (Gait) 30ft  -CB     Deviations/Abnormal Patterns (Gait) gait speed decreased;antalgic;base of support, narrow;stride length decreased;kianna decreased  -CB     Bilateral Gait Deviations decreased arm swing  -CB           User Key  (r) = Recorded By, (t) = Taken By, (c) = Cosigned By    Initials Name Provider Type    Angela Camacho PT Physical Therapist               Obj/Interventions     Row Name 02/18/23 1149          Range of Motion Comprehensive    General Range of Motion bilateral lower extremity ROM WFL  -CB     Row Name 02/18/23 1149          Strength Comprehensive (MMT)    Comment, General Manual Muscle Testing (MMT) Assessment post op weakness  -CB     Row Name 02/18/23 1149          Motor Skills    Therapeutic Exercise --  cardiac protocol x5 reps  -CB     Row Name 02/18/23 1149          Balance    Balance Assessment standing static balance;standing dynamic balance  -CB     Static Standing Balance minimal assist;verbal cues  -CB     Dynamic Standing Balance minimal  assist;2-person assist;verbal cues  -CB     Position/Device Used, Standing Balance supported  -CB     Balance Interventions sitting;standing;sit to stand;supported;static;dynamic;minimal challenge  -CB     Row Name 02/18/23 1149          Sensory Assessment (Somatosensory)    Sensory Assessment (Somatosensory) sensation intact  -CB           User Key  (r) = Recorded By, (t) = Taken By, (c) = Cosigned By    Initials Name Provider Type    CB Angela López, PT Physical Therapist               Goals/Plan     Row Name 02/18/23 1204          Problem Specific Goal 1 (PT)    Problem Specific Goal 1 (PT) Pt will achieve cardiac level 5  -CB     Time Frame (Problem Specific Goal 1, PT) long-term goal (LTG);1 week  -CB     Row Name 02/18/23 1204          Therapy Assessment/Plan (PT)    Planned Therapy Interventions (PT) balance training;bed mobility training;gait training;home exercise program;patient/family education;strengthening;transfer training;ROM (range of motion);stair training  -CB           User Key  (r) = Recorded By, (t) = Taken By, (c) = Cosigned By    Initials Name Provider Type    Angela Camacho, PT Physical Therapist               Clinical Impression     Row Name 02/18/23 1143          Pain    Pretreatment Pain Rating 3/10  -CB     Posttreatment Pain Rating 7/10  -CB     Pain Location incisional  -CB     Pain Location - chest  -CB     Pain Intervention(s) Repositioned;Ambulation/increased activity  -CB     Row Name 02/18/23 1142          Plan of Care Review    Plan of Care Reviewed With patient  -CB     Outcome Evaluation Patient is a 36 yo male who is s/p MVR. PMHx inlcudes Marfan Syndrome. He lives with a friend in a second floor apartment and is ind at baseline. Today he presents with decreased activity tolerance, strength, and overall functional decline post op. He was Barrera for STS and ambulated 30ft with HHA requiring minAx2. Pt also completed cardiac protocol x10 reps. He will continue to benefit from  skilled PT to address functional deficits. Plan return home at [place].  -CB     Row Name 02/18/23 1149          Therapy Assessment/Plan (PT)    Rehab Potential (PT) good, to achieve stated therapy goals  -CB     Criteria for Skilled Interventions Met (PT) yes  -CB     Therapy Frequency (PT) 6 times/wk  -CB     Row Name 02/18/23 1149          Vital Signs    Posttreatment Heart Rate (beats/min) 88  -CB     Post SpO2 (%) 98  -CB     O2 Delivery Post Treatment room air  -CB     Row Name 02/18/23 1149          Positioning and Restraints    Pre-Treatment Position sitting in chair/recliner  -CB     Post Treatment Position chair  -CB     In Chair notified nsg;reclined;call light within reach;encouraged to call for assist;legs elevated  -CB           User Key  (r) = Recorded By, (t) = Taken By, (c) = Cosigned By    Initials Name Provider Type    Angela Camacho PT Physical Therapist               Outcome Measures     Row Name 02/18/23 1205          How much help from another person do you currently need...    Turning from your back to your side while in flat bed without using bedrails? 3  -CB     Moving from lying on back to sitting on the side of a flat bed without bedrails? 2  -CB     Moving to and from a bed to a chair (including a wheelchair)? 3  -CB     Standing up from a chair using your arms (e.g., wheelchair, bedside chair)? 3  -CB     Climbing 3-5 steps with a railing? 2  -CB     To walk in hospital room? 3  -CB     AM-PAC 6 Clicks Score (PT) 16  -CB     Highest level of mobility 5 --> Static standing  -CB     Row Name 02/18/23 1205          Functional Assessment    Outcome Measure Options AM-PAC 6 Clicks Basic Mobility (PT)  -CB           User Key  (r) = Recorded By, (t) = Taken By, (c) = Cosigned By    Initials Name Provider Type    Angela Camacho PT Physical Therapist                             Physical Therapy Education     Title: PT OT SLP Therapies (Done)     Topic: Physical Therapy (Done)     Point:  Mobility training (Done)     Learning Progress Summary           Patient Acceptance, E,TB, VU,NR by  at 2/18/2023 1206                   Point: Home exercise program (Done)     Learning Progress Summary           Patient Acceptance, E,TB, VU,NR by CB at 2/18/2023 1206                   Point: Body mechanics (Done)     Learning Progress Summary           Patient Acceptance, E,TB, VU,NR by CB at 2/18/2023 1206                   Point: Precautions (Done)     Learning Progress Summary           Patient Acceptance, E,TB, VU,NR by  at 2/18/2023 1206                               User Key     Initials Effective Dates Name Provider Type Discipline     10/22/21 -  Angela López, PT Physical Therapist PT              PT Recommendation and Plan  Planned Therapy Interventions (PT): balance training, bed mobility training, gait training, home exercise program, patient/family education, strengthening, transfer training, ROM (range of motion), stair training  Plan of Care Reviewed With: patient  Outcome Evaluation: Patient is a 36 yo male who is s/p MVR. PMHx inlcudes Marfan Syndrome. He lives with a friend in a second floor apartment and is ind at baseline. Today he presents with decreased activity tolerance, strength, and overall functional decline post op. He was Barrera for STS and ambulated 30ft with HHA requiring minAx2. Pt also completed cardiac protocol x10 reps. He will continue to benefit from skilled PT to address functional deficits. Plan return home at [place].     Time Calculation:    PT Charges     Row Name 02/18/23 1207             Time Calculation    Start Time 1000  -CB      Stop Time 1016  -CB      Time Calculation (min) 16 min  -CB      PT Received On 02/18/23  -CB      PT - Next Appointment 02/20/23  -CB      PT Goal Re-Cert Due Date 02/25/23  -CB         Time Calculation- PT    Total Timed Code Minutes- PT 8 minute(s)  -CB         Timed Charges    70985 - PT Therapeutic Activity Minutes 8  -CB         Total  Minutes    Timed Charges Total Minutes 8  -CB       Total Minutes 8  -CB            User Key  (r) = Recorded By, (t) = Taken By, (c) = Cosigned By    Initials Name Provider Type    CB Angela López, PT Physical Therapist              Therapy Charges for Today     Code Description Service Date Service Provider Modifiers Qty    29203041323  PT THERAPEUTIC ACT EA 15 MIN 2/18/2023 Angela López, PT GP 1    87322892450 HC PT EVAL MOD COMPLEXITY 3 2/18/2023 Angela López, PT GP 1    [id number]  PT THER SUPP EA 15 MIN 2/18/2023 Angela López, PT GP 1          PT G-Codes  Outcome Measure Options: AM-PAC 6 Clicks Basic Mobility (PT)  AM-PAC 6 Clicks Score (PT): 16  PT Discharge Summary  Anticipated Discharge Disposition (PT): home with home health, home with assist    Angela López, TOMMY  2/18/2023

## 2023-02-19 ENCOUNTER — APPOINTMENT (OUTPATIENT)
Dept: GENERAL RADIOLOGY | Facility: HOSPITAL | Age: 38
DRG: 220 | End: 2023-02-19
Payer: MEDICAID

## 2023-02-19 LAB
ANION GAP SERPL CALCULATED.3IONS-SCNC: 6.1 MMOL/L (ref 5–15)
BUN SERPL-MCNC: 11 MG/DL (ref 6–20)
BUN/CREAT SERPL: 12.4 (ref 7–25)
CALCIUM SPEC-SCNC: 8.3 MG/DL (ref 8.6–10.5)
CHLORIDE SERPL-SCNC: 102 MMOL/L (ref 98–107)
CO2 SERPL-SCNC: 27.9 MMOL/L (ref 22–29)
CREAT SERPL-MCNC: 0.89 MG/DL (ref 0.76–1.27)
DEPRECATED RDW RBC AUTO: 40.6 FL (ref 37–54)
EGFRCR SERPLBLD CKD-EPI 2021: 111.8 ML/MIN/1.73
ERYTHROCYTE [DISTWIDTH] IN BLOOD BY AUTOMATED COUNT: 13 % (ref 12.3–15.4)
GLUCOSE BLDC GLUCOMTR-MCNC: 102 MG/DL (ref 70–130)
GLUCOSE BLDC GLUCOMTR-MCNC: 106 MG/DL (ref 70–130)
GLUCOSE BLDC GLUCOMTR-MCNC: 106 MG/DL (ref 70–130)
GLUCOSE BLDC GLUCOMTR-MCNC: 107 MG/DL (ref 70–130)
GLUCOSE SERPL-MCNC: 118 MG/DL (ref 65–99)
HCT VFR BLD AUTO: 30.6 % (ref 37.5–51)
HGB BLD-MCNC: 10.3 G/DL (ref 13–17.7)
MCH RBC QN AUTO: 29.3 PG (ref 26.6–33)
MCHC RBC AUTO-ENTMCNC: 33.7 G/DL (ref 31.5–35.7)
MCV RBC AUTO: 87.2 FL (ref 79–97)
PHOSPHATE SERPL-MCNC: 2 MG/DL (ref 2.5–4.5)
PLATELET # BLD AUTO: 91 10*3/MM3 (ref 140–450)
PMV BLD AUTO: 12.9 FL (ref 6–12)
POTASSIUM SERPL-SCNC: 3.6 MMOL/L (ref 3.5–5.2)
POTASSIUM SERPL-SCNC: 4.3 MMOL/L (ref 3.5–5.2)
QT INTERVAL: 363 MS
QT INTERVAL: 387 MS
RBC # BLD AUTO: 3.51 10*6/MM3 (ref 4.14–5.8)
SODIUM SERPL-SCNC: 136 MMOL/L (ref 136–145)
WBC NRBC COR # BLD: 14.11 10*3/MM3 (ref 3.4–10.8)

## 2023-02-19 PROCEDURE — 93010 ELECTROCARDIOGRAM REPORT: CPT | Performed by: INTERNAL MEDICINE

## 2023-02-19 PROCEDURE — 25010000002 FUROSEMIDE PER 20 MG: Performed by: NURSE PRACTITIONER

## 2023-02-19 PROCEDURE — 71045 X-RAY EXAM CHEST 1 VIEW: CPT

## 2023-02-19 PROCEDURE — 80048 BASIC METABOLIC PNL TOTAL CA: CPT | Performed by: NURSE PRACTITIONER

## 2023-02-19 PROCEDURE — 25010000002 CEFAZOLIN IN DEXTROSE 2-4 GM/100ML-% SOLUTION: Performed by: NURSE PRACTITIONER

## 2023-02-19 PROCEDURE — 25010000002 KETOROLAC TROMETHAMINE PER 15 MG: Performed by: NURSE PRACTITIONER

## 2023-02-19 PROCEDURE — 99024 POSTOP FOLLOW-UP VISIT: CPT | Performed by: THORACIC SURGERY (CARDIOTHORACIC VASCULAR SURGERY)

## 2023-02-19 PROCEDURE — 93005 ELECTROCARDIOGRAM TRACING: CPT | Performed by: NURSE PRACTITIONER

## 2023-02-19 PROCEDURE — 82962 GLUCOSE BLOOD TEST: CPT

## 2023-02-19 PROCEDURE — 93005 ELECTROCARDIOGRAM TRACING: CPT | Performed by: INTERNAL MEDICINE

## 2023-02-19 PROCEDURE — 99232 SBSQ HOSP IP/OBS MODERATE 35: CPT | Performed by: INTERNAL MEDICINE

## 2023-02-19 PROCEDURE — 25010000002 CALCIUM GLUCONATE-NACL 1-0.675 GM/50ML-% SOLUTION: Performed by: NURSE PRACTITIONER

## 2023-02-19 PROCEDURE — 85027 COMPLETE CBC AUTOMATED: CPT | Performed by: NURSE PRACTITIONER

## 2023-02-19 PROCEDURE — 84100 ASSAY OF PHOSPHORUS: CPT | Performed by: INTERNAL MEDICINE

## 2023-02-19 PROCEDURE — 84132 ASSAY OF SERUM POTASSIUM: CPT | Performed by: THORACIC SURGERY (CARDIOTHORACIC VASCULAR SURGERY)

## 2023-02-19 RX ORDER — MELATONIN
1000 DAILY
Status: DISCONTINUED | OUTPATIENT
Start: 2023-02-19 | End: 2023-02-21 | Stop reason: HOSPADM

## 2023-02-19 RX ORDER — FUROSEMIDE 10 MG/ML
40 INJECTION INTRAMUSCULAR; INTRAVENOUS ONCE
Status: COMPLETED | OUTPATIENT
Start: 2023-02-19 | End: 2023-02-19

## 2023-02-19 RX ORDER — POLYETHYLENE GLYCOL 3350 17 G/17G
17 POWDER, FOR SOLUTION ORAL DAILY
Status: DISCONTINUED | OUTPATIENT
Start: 2023-02-19 | End: 2023-02-21 | Stop reason: HOSPADM

## 2023-02-19 RX ORDER — POTASSIUM CHLORIDE 750 MG/1
20 TABLET, FILM COATED, EXTENDED RELEASE ORAL ONCE
Status: COMPLETED | OUTPATIENT
Start: 2023-02-19 | End: 2023-02-19

## 2023-02-19 RX ORDER — TRAMADOL HYDROCHLORIDE 50 MG/1
50 TABLET ORAL EVERY 6 HOURS PRN
Status: DISCONTINUED | OUTPATIENT
Start: 2023-02-19 | End: 2023-02-21 | Stop reason: HOSPADM

## 2023-02-19 RX ORDER — CALCIUM GLUCONATE 20 MG/ML
1 INJECTION, SOLUTION INTRAVENOUS ONCE
Status: COMPLETED | OUTPATIENT
Start: 2023-02-19 | End: 2023-02-19

## 2023-02-19 RX ORDER — FUROSEMIDE 10 MG/ML
20 INJECTION INTRAMUSCULAR; INTRAVENOUS ONCE
Status: DISCONTINUED | OUTPATIENT
Start: 2023-02-19 | End: 2023-02-19

## 2023-02-19 RX ADMIN — KETOROLAC TROMETHAMINE 15 MG: 30 INJECTION, SOLUTION INTRAMUSCULAR; INTRAVENOUS at 10:41

## 2023-02-19 RX ADMIN — POLYETHYLENE GLYCOL 3350 17 G: 17 POWDER, FOR SOLUTION ORAL at 08:40

## 2023-02-19 RX ADMIN — OXYCODONE 10 MG: 5 TABLET ORAL at 01:46

## 2023-02-19 RX ADMIN — DOCUSATE SODIUM 50MG AND SENNOSIDES 8.6MG 2 TABLET: 8.6; 5 TABLET, FILM COATED ORAL at 20:17

## 2023-02-19 RX ADMIN — GUAIFENESIN 1200 MG: 600 TABLET, EXTENDED RELEASE ORAL at 08:25

## 2023-02-19 RX ADMIN — FUROSEMIDE 40 MG: 10 INJECTION, SOLUTION INTRAMUSCULAR; INTRAVENOUS at 08:40

## 2023-02-19 RX ADMIN — CEFAZOLIN SODIUM 2 G: 2 INJECTION, SOLUTION INTRAVENOUS at 00:01

## 2023-02-19 RX ADMIN — ASPIRIN 81 MG: 81 TABLET, COATED ORAL at 08:25

## 2023-02-19 RX ADMIN — CHLORHEXIDINE GLUCONATE 0.12% ORAL RINSE 15 ML: 1.2 LIQUID ORAL at 08:25

## 2023-02-19 RX ADMIN — POTASSIUM CHLORIDE 40 MEQ: 750 TABLET, EXTENDED RELEASE ORAL at 05:53

## 2023-02-19 RX ADMIN — POTASSIUM CHLORIDE 20 MEQ: 750 TABLET, EXTENDED RELEASE ORAL at 08:00

## 2023-02-19 RX ADMIN — HYDROCODONE BITARTRATE AND ACETAMINOPHEN 2 TABLET: 5; 325 TABLET ORAL at 20:17

## 2023-02-19 RX ADMIN — CALCIUM GLUCONATE 1 G: 20 INJECTION, SOLUTION INTRAVENOUS at 08:25

## 2023-02-19 RX ADMIN — CHLORHEXIDINE GLUCONATE 0.12% ORAL RINSE 15 ML: 1.2 LIQUID ORAL at 20:17

## 2023-02-19 RX ADMIN — GUAIFENESIN 1200 MG: 600 TABLET, EXTENDED RELEASE ORAL at 20:17

## 2023-02-19 RX ADMIN — OXYCODONE 10 MG: 5 TABLET ORAL at 05:53

## 2023-02-19 RX ADMIN — CYCLOBENZAPRINE 10 MG: 10 TABLET, FILM COATED ORAL at 08:25

## 2023-02-19 RX ADMIN — METOPROLOL TARTRATE 25 MG: 25 TABLET, FILM COATED ORAL at 08:25

## 2023-02-19 RX ADMIN — MUPIROCIN 1 APPLICATION: 20 OINTMENT TOPICAL at 20:17

## 2023-02-19 RX ADMIN — Medication 1000 UNITS: at 18:29

## 2023-02-19 RX ADMIN — METOPROLOL TARTRATE 25 MG: 25 TABLET, FILM COATED ORAL at 20:17

## 2023-02-19 RX ADMIN — MUPIROCIN 1 APPLICATION: 20 OINTMENT TOPICAL at 08:25

## 2023-02-19 RX ADMIN — POTASSIUM CHLORIDE 40 MEQ: 750 TABLET, EXTENDED RELEASE ORAL at 12:45

## 2023-02-19 RX ADMIN — PANTOPRAZOLE SODIUM 40 MG: 40 TABLET, DELAYED RELEASE ORAL at 05:54

## 2023-02-19 RX ADMIN — FOLIC ACID 1 MG: 5 INJECTION, SOLUTION INTRAMUSCULAR; INTRAVENOUS; SUBCUTANEOUS at 08:26

## 2023-02-19 NOTE — PROGRESS NOTES
LOS: 2 days   Patient Care Team:  Donal Hutchison MD as PCP - General (Internal Medicine)  ADRIANNA Bernard MD as Consulting Physician (Cardiology)    Chief Complaint: Follow-up mitral valve repair, Marfan syndrome, postoperative ventricular tachycardia.    Interval History: Rhythm stable.  He still feels generalized malaise and weak.  No shortness of breath.    Vital Signs:  Temp:  [98.2 °F (36.8 °C)-98.9 °F (37.2 °C)] 98.7 °F (37.1 °C)  Heart Rate:  [82-89] 84  Resp:  [17-18] 18  BP: (100-124)/(55-72) 109/62    Intake/Output Summary (Last 24 hours) at 2/19/2023 1644  Last data filed at 2/19/2023 1509  Gross per 24 hour   Intake 2016.53 ml   Output 2670 ml   Net -653.47 ml       Physical Exam:   General Appearance:    No acute distress, alert and oriented x4   Lungs:     Clear to auscultation bilaterally     Heart:    Regular rhythm and normal rate. +rub   Abdomen:     Soft, nontender, nondistended.    Extremities:   Moves all extremities well.  No clubbing, cyanosis, or edema.     Results Review:    Results from last 7 days   Lab Units 02/19/23  0359   SODIUM mmol/L 136   POTASSIUM mmol/L 3.6   CHLORIDE mmol/L 102   CO2 mmol/L 27.9   BUN mg/dL 11   CREATININE mg/dL 0.89   GLUCOSE mg/dL 118*   CALCIUM mg/dL 8.3*         Results from last 7 days   Lab Units 02/19/23  0359   WBC 10*3/mm3 14.11*   HEMOGLOBIN g/dL 10.3*   HEMATOCRIT % 30.6*   PLATELETS 10*3/mm3 91*     Results from last 7 days   Lab Units 02/17/23  2204 02/17/23  1118 02/17/23  0952 02/17/23  0945 02/15/23  0948   INR  1.46* 1.41* 1.5* 1.55* 1.08   APTT seconds  --  31.4  --  31.3 28.7     Results from last 7 days   Lab Units 02/15/23  0948   CHOLESTEROL mg/dL 146     Results from last 7 days   Lab Units 02/18/23  0310   MAGNESIUM mg/dL 2.5     Results from last 7 days   Lab Units 02/15/23  0948   CHOLESTEROL mg/dL 146   TRIGLYCERIDES mg/dL 71   HDL CHOL mg/dL 47   LDL CHOL mg/dL 85       I reviewed the patient's new clinical results.         Assessment:  1.  Symptomatic severe mitral regurgitation, status post mitral valve repair on 2/17/2023 by Dr. Gann.  2.  Marfan syndrome  3.  Bicuspid aortic valve with mild aortic insufficiency  4.  History of either TIA or small stroke  5.  Postoperative ventricular tachycardia  6.  Chronic mild thrombocytopenia secondary to ITP  7.  Expected postoperative anemia  8.  Reactive leukocytosis secondary to intraoperative steroids  9.  Postoperative pericarditis    Plan:  -I agree that the EKG appears to show pericarditis.  However, he was not having any chest pain when I initially saw him.  I suspect that this will resolve.  Recheck EKG tomorrow.    -Lasix 40 mg IV given this morning.    -Doing much better from a rhythm standpoint.  The ventricular tachycardia has resolved.  Off IV amiodarone.  Metoprolol increased to 25 mg twice a day.    -Hematology following for thrombocytopenia.  Platelet count 91 today.      Christiano David MD  02/19/23  16:44 EST

## 2023-02-19 NOTE — PROGRESS NOTES
" LOS: 2 days   Patient Care Team:  Donal Hutchison MD as PCP - General (Internal Medicine)  ADRIANNA Bernard MD as Consulting Physician (Cardiology)    Chief Complaint: post op    Subjective:  Symptoms:  He reports chest pain.  No shortness of breath.    Diet:  Adequate intake.  No nausea or vomiting.    Activity level: Impaired due to pain.    Pain:  He complains of pain that is mild.  Pain is well controlled.      Vital Signs  Temp:  [95.7 °F (35.4 °C)-98.9 °F (37.2 °C)] 98.9 °F (37.2 °C)  Heart Rate:  [82-89] 89  Resp:  [16-18] 17  BP: ()/(55-76) 115/68  Arterial Line BP: ()/(56-70) 98/59  Body mass index is 21.09 kg/m².    Intake/Output Summary (Last 24 hours) at 2/19/2023 0714  Last data filed at 2/19/2023 0405  Gross per 24 hour   Intake 1636.53 ml   Output 4295 ml   Net -2658.47 ml     No intake/output data recorded.    Chest tube drainage last 8 hours: 100        02/17/23  0559 02/18/23  0600   Weight: 83 kg (183 lb) 82.8 kg (182 lb 8.7 oz)     Objective:  General Appearance:  Comfortable and in no acute distress.    Vital signs: (most recent): Blood pressure 115/68, pulse 89, temperature 98.9 °F (37.2 °C), temperature source Oral, resp. rate 17, height 198.1 cm (78\"), weight 82.8 kg (182 lb 8.7 oz), SpO2 91 %.  Vital signs are normal.  No fever.    Output: Producing urine and no stool output.    Lungs:  Normal effort and normal respiratory rate.  There are rales and decreased breath sounds.    Heart: Normal rate.  Regular rhythm.  Positive for friction rub.  (SR in 80s)  Abdomen: Abdomen is soft.  Hypoactive bowel sounds.     Pulses: Distal pulses are intact.    Neurological: Patient is alert and oriented to person, place and time.    Skin:  Warm and dry.  (Sternal dressing clean, dry, and intact)    Results Review:        WBC WBC   Date Value Ref Range Status   02/19/2023 14.11 (H) 3.40 - 10.80 10*3/mm3 Final   02/18/2023 30.58 (C) 3.40 - 10.80 10*3/mm3 Final   02/17/2023 27.78 (H) 3.40 - 10.80 " 10*3/mm3 Final   02/17/2023 19.24 (H) 3.40 - 10.80 10*3/mm3 Final   02/17/2023 5.72 3.40 - 10.80 10*3/mm3 Final   02/17/2023 3.45 3.40 - 10.80 10*3/mm3 Final      HGB Hemoglobin   Date Value Ref Range Status   02/19/2023 10.3 (L) 13.0 - 17.7 g/dL Final   02/18/2023 11.2 (L) 13.0 - 17.7 g/dL Final   02/17/2023 11.8 (L) 13.0 - 17.7 g/dL Final   02/17/2023 12.8 (L) 13.0 - 17.7 g/dL Final   02/17/2023 12.5 (L) 13.0 - 17.7 g/dL Final   02/17/2023 12.4 (L) 13.0 - 17.7 g/dL Final   02/17/2023 11.6 (L) 12.0 - 17.0 g/dL Final   02/17/2023 12.2 12.0 - 17.0 g/dL Final   02/17/2023 13.3 12.0 - 17.0 g/dL Final   02/17/2023 11.9 (L) 12.0 - 17.0 g/dL Final   02/17/2023 12.9 12.0 - 17.0 g/dL Final      HCT Hematocrit   Date Value Ref Range Status   02/19/2023 30.6 (L) 37.5 - 51.0 % Final   02/18/2023 32.5 (L) 37.5 - 51.0 % Final   02/17/2023 33.8 (L) 37.5 - 51.0 % Final   02/17/2023 36.3 (L) 37.5 - 51.0 % Final   02/17/2023 37.1 (L) 37.5 - 51.0 % Final   02/17/2023 36.5 (L) 37.5 - 51.0 % Final   02/17/2023 34 (L) 38 - 51 % Final   02/17/2023 36 (L) 38 - 51 % Final   02/17/2023 39 38 - 51 % Final   02/17/2023 35 (L) 38 - 51 % Final   02/17/2023 38 38 - 51 % Final      Platelets Platelets   Date Value Ref Range Status   02/19/2023 91 (L) 140 - 450 10*3/mm3 Final   02/18/2023 141 140 - 450 10*3/mm3 Final   02/17/2023 140 140 - 450 10*3/mm3 Final   02/17/2023 122 (L) 140 - 450 10*3/mm3 Final   02/17/2023 125 (L) 140 - 450 10*3/mm3 Final   02/17/2023 125 (L) 140 - 450 10*3/mm3 Final   02/17/2023 89 (L) 140 - 450 10*3/mm3 Final        PT/INR:    Protime   Date Value Ref Range Status   02/17/2023 17.9 (H) 11.7 - 14.2 Seconds Final   02/17/2023 17.4 (H) 11.7 - 14.2 Seconds Final   02/17/2023 17.9 (H) 12.8 - 15.2 seconds Final     Comment:     Serial Number: [id number]:  [id number]   02/17/2023 18.8 (H) 11.7 - 14.2 Seconds Final   /  INR   Date Value Ref Range Status   02/17/2023 1.46 (H) 0.90 - 1.10 Final   02/17/2023 1.41 (H) 0.90 - 1.10  Final   02/17/2023 1.5 (H) 0.8 - 1.2 Final   02/17/2023 1.55 (H) 0.90 - 1.10 Final       Sodium Sodium   Date Value Ref Range Status   02/19/2023 136 136 - 145 mmol/L Final   02/18/2023 137 136 - 145 mmol/L Final   02/18/2023 137 136 - 145 mmol/L Final   02/17/2023 134 (L) 136 - 145 mmol/L Final   02/17/2023 140 136 - 145 mmol/L Final   02/17/2023 139 136 - 145 mmol/L Final      Potassium Potassium   Date Value Ref Range Status   02/19/2023 3.6 3.5 - 5.2 mmol/L Final   02/18/2023 4.5 3.5 - 5.2 mmol/L Final   02/18/2023 5.3 (H) 3.5 - 5.2 mmol/L Final   02/17/2023 5.2 3.5 - 5.2 mmol/L Final   02/17/2023 3.8 3.5 - 5.2 mmol/L Final   02/17/2023 4.2 3.5 - 5.2 mmol/L Final     Comment:     Slight hemolysis detected by analyzer. Results may be affected.      Chloride Chloride   Date Value Ref Range Status   02/19/2023 102 98 - 107 mmol/L Final   02/18/2023 102 98 - 107 mmol/L Final   02/18/2023 107 98 - 107 mmol/L Final   02/17/2023 106 98 - 107 mmol/L Final   02/17/2023 110 (H) 98 - 107 mmol/L Final   02/17/2023 109 (H) 98 - 107 mmol/L Final      Bicarbonate CO2   Date Value Ref Range Status   02/19/2023 27.9 22.0 - 29.0 mmol/L Final   02/18/2023 24.6 22.0 - 29.0 mmol/L Final   02/18/2023 23.0 22.0 - 29.0 mmol/L Final   02/17/2023 21.8 (L) 22.0 - 29.0 mmol/L Final   02/17/2023 22.0 22.0 - 29.0 mmol/L Final   02/17/2023 22.0 22.0 - 29.0 mmol/L Final      BUN BUN   Date Value Ref Range Status   02/19/2023 11 6 - 20 mg/dL Final   02/18/2023 10 6 - 20 mg/dL Final   02/18/2023 9 6 - 20 mg/dL Final   02/17/2023 9 6 - 20 mg/dL Final   02/17/2023 9 6 - 20 mg/dL Final   02/17/2023 10 6 - 20 mg/dL Final      Creatinine Creatinine   Date Value Ref Range Status   02/19/2023 0.89 0.76 - 1.27 mg/dL Final   02/18/2023 0.92 0.76 - 1.27 mg/dL Final   02/18/2023 0.93 0.76 - 1.27 mg/dL Final   02/17/2023 0.94 0.76 - 1.27 mg/dL Final   02/17/2023 0.93 0.76 - 1.27 mg/dL Final   02/17/2023 0.98 0.76 - 1.27 mg/dL Final      Calcium Calcium    Date Value Ref Range Status   02/19/2023 8.3 (L) 8.6 - 10.5 mg/dL Final   02/18/2023 8.3 (L) 8.6 - 10.5 mg/dL Final   02/18/2023 7.9 (L) 8.6 - 10.5 mg/dL Final   02/17/2023 7.9 (L) 8.6 - 10.5 mg/dL Final   02/17/2023 8.6 8.6 - 10.5 mg/dL Final   02/17/2023 8.0 (L) 8.6 - 10.5 mg/dL Final      Magnesium Magnesium   Date Value Ref Range Status   02/18/2023 2.5 1.6 - 2.6 mg/dL Final   02/17/2023 3.0 (H) 1.6 - 2.6 mg/dL Final   02/17/2023 2.0 1.6 - 2.6 mg/dL Final   02/17/2023 2.5 1.6 - 2.6 mg/dL Final          aspirin, 81 mg, Oral, Daily  chlorhexidine, 15 mL, Mouth/Throat, Q12H  enoxaparin, 40 mg, Subcutaneous, Daily  folic acid (FOLVITE) IVPB, 1 mg, Intravenous, Daily  guaiFENesin, 1,200 mg, Oral, Q12H  insulin lispro, 0-9 Units, Subcutaneous, 4x Daily With Meals & Nightly  metoprolol tartrate, 12.5 mg, Oral, Q12H  mupirocin, , Each Nare, BID  pantoprazole, 40 mg, Intravenous, Once   Followed by  pantoprazole, 40 mg, Oral, QAM  Scopolamine, 1 patch, Transdermal, Q72H  senna-docusate sodium, 2 tablet, Oral, Nightly      clevidipine, 2-32 mg/hr  dexmedetomidine, 0.2-1.5 mcg/kg/hr, Last Rate: Stopped (02/17/23 1647)  DOPamine, 2-20 mcg/kg/min  EPINEPHrine, 0.02-0.1 mcg/kg/min  milrinone, 0.25-0.375 mcg/kg/min  niCARdipine, 5-15 mg/hr, Last Rate: Stopped (02/17/23 1130)  nitroglycerin, 5-200 mcg/min  norepinephrine, 0.02-0.2 mcg/kg/min, Last Rate: Stopped (02/18/23 1116)  phenylephrine, 0.2-2 mcg/kg/min, Last Rate: Stopped (02/17/23 2100)  propofol, 5-50 mcg/kg/min, Last Rate: Stopped (02/17/23 1519)  sodium chloride, 30 mL/hr, Last Rate: 30 mL/hr (02/17/23 1151)        Patient Active Problem List   Diagnosis Code   • VHD (valvular heart disease) I38   • Marfan syndrome Q87.40   • Severe mitral regurgitation I34.0   • Bicuspid aortic valve Q23.1   • Dyspnea on effort R06.09   • Chest pain, atypical R07.89   • Mitral valve insufficiency, unspecified etiology I34.0       Assessment & Plan   - Severe mitral valve  regurgitation s/p MV repair POD#2 Azeem  - Marfan syndrome  - bicuspid aortic valve with mild insufficiency  - ITP; hematology following  - post op anemia--expected acute blood loss  - leukocytosis--reactive/intraoperative steroids; trending down    Looks good this morning, sitting up in the chair  Remains in SR with rates in the 80s. Increase beta blocker  CXR with persistent vascular congestion--IV diurese again today. Replete electrolytes  Interval increase in right apical PTX this am. No air leak on exam. Discussed with Dr. Gann will clamp chest tube and repeat CXR. If no change, will likely remove chest tubes this afternoon  Weaned to RA and tolerating   +rub on exam. EKG consistent with possible pericarditis. Will discuss with Dr. Gann  Platelet count down to 91K this morning. Will hold lovenox  Mobilize/aggressive pulmonary toilet  Discontinue central line and adam catheter  Continue routine care    Lindsey Jung, GUILLERMINA  02/19/23  07:14 EST

## 2023-02-19 NOTE — PLAN OF CARE
Goal Outcome Evaluation:      Pain controlled with flexeril. Ambulated x1 in edwards with x2 assist, pt refused ambulation otherwise. Pt educated on importance of ambulation and IS. IS to 2000. Chest tubes, IJ, adam removed per order. Pt due to void. Will continue to monitor.

## 2023-02-19 NOTE — CONSULTS
Met with patient, discussed benefits of cardiac rehab. Provided phase II information along with the contact information for cardiac rehab  at [facility]. Requested for referral to be sent.. Explained if receiving home health would not be able to attend cardiac rehab until finished with home health.

## 2023-02-19 NOTE — PAYOR COMM NOTE
"Regino So (37 y.o. Male)                             ATTENTION; CLINICALS CASE REF #[id number]                          REPLY TO [facility] DEPT  [phone] OR CALL  CARL YEH LPMANDY       Date of Birth   1985    Social Security Number       Address   [address] [place], [place] [address]    Home Phone   [phone]    MRN   [id number]       Holiness   [facility]    Marital Status   Single                            Admission Date   2/17/23    Admission Type   Elective    Admitting Provider   Matthew Gann MD    Attending Provider   Matthew Gann MD    Department, Room/Bed   [facility] CARDIOVASC UNIT, 2224/1       Discharge Date       Discharge Disposition       Discharge Destination                               Attending Provider: Matthew Gann MD    Allergies: No Known Allergies    Isolation: None   Infection: None   Code Status: CPR    Ht: 198.1 cm (78\")   Wt: 85.5 kg (188 lb 9.6 oz)    Admission Cmt: None   Principal Problem: Severe mitral regurgitation [I34.0]                 Active Insurance as of 2/17/2023     Primary Coverage     Payor Plan Insurance Group Employer/Plan Group    ANTHEM MEDICAID ANTHEM MEDICAID [id number]     Payor Plan Address Payor Plan Phone Number Payor Plan Fax Number Effective Dates    [address] [address] [phone]  7/2/2019 - None Entered    [facility] [id number]       Subscriber Name Subscriber Birth Date Member ID       REGINO SO 1985 [id number]                 Emergency Contacts      (Rel.) Home Phone Work Phone Mobile Phone    ARPIT SO (Father) [phone] -- [phone]    Sara So (Mother) [phone] -- [phone]               History & Physical      Lindsey Jung APRN at 02/15/23 1110     Attestation signed by Matthew Gann MD at 02/18/23 1203    I have reviewed this documentation and agree.                      Name: Regino So ADMIT: (Not on " file)   : 1985  PCP: Donal Hutchison MD    MRN: [id number] LOS: 0 days   AGE/SEX: 37 y.o. male  ROOM: Room/bed info not found     Chief Complaint: dyspnea    Subjective      History of Present Illness     Patient is a 37 y.o. male with a history of mitral regurgitation, heart murmur, hypertension and presumably a TIA or small stroke the past who has developed progressive dyspnea with exertion and fatigue. He states that his symptoms have worsened over the last 2 years and specifically the last few months. He also states having some left chest wall discomfort which is unrelated to effort.  He had a 2D echo that showed moderate eccentric mitral regurgitation and a normal ejection function at JEFFERSON follow and showed eccentric mitral valve regurgitation suggesting posterior prolapse. There is history of Marfan's presumably in the mother side of the family. CT of the chest was without thoracic aortic dilation. He was seen by Dr. Gann in surgical consultation last August. At that time Dr. Gann recommended mitral valve repair or replacement with plans for mechanical prosthesis if replacement is necessary.  He reports rare ETOH, he is a former smoker and reports he currently vapes. He denies a history of endocarditis or IV drug use. I am seeing him for the first time today in [facility] where he presents for preoperative testing prior to his upcoming surgery. His father is present for the visit today. Since seeing Dr. Gann he underwent stress test which was without evidence of ischemia. He otherwise denies any changes to his medical record since last being seen.     Past Medical History:   Diagnosis Date   • Asthma    • Bicuspid aortic valve    • Broken ankle, left, sequela     HX   • CHF (congestive heart failure) (HCC)    • Congenital heart disease    • Dyspnea on exertion    • Heart murmur    • Heart valve disease    • History of migraine    • Hypertension     QUIT TAKING MEDS AGE 19 DUE TO INSURANCE REASONS   •  Marfan syndrome    • Mitral valve prolapse    • Scoliosis    • TIA (transient ischemic attack)     HX     Past Surgical History:   Procedure Laterality Date   • FEMUR SURGERY Right     X2 FOR FRACTURE AGE 11   • LYMPH NODE DISSECTION      NECK   • TRANSESOPHAGEAL ECHOCARDIOGRAM (JEFFERSON) N/A 08/11/2022    Procedure: TRANSESOPHAGEAL ECHOCARDIOGRAM;  Surgeon: ADRIANNA Bernard MD;  Location: [facility] ENDOSCOPY;  Service: Cardiology;  Laterality: N/A;     Family History   Problem Relation Age of Onset   • Aortic aneurysm Mother    • Heart attack Mother    • No Known Problems Father    • Malig Hyperthermia Neg Hx      Social History     Tobacco Use   • Smoking status: Former     Types: Cigarettes     Quit date: 2020     Years since quitting: 3.1   • Smokeless tobacco: Never   • Tobacco comments:     MORE OF SOCIAL SMOKER FOR 8 YEARS   Vaping Use   • Vaping Use: Every day   • Substances: Nicotine, Flavoring   • Devices: Disposable   Substance Use Topics   • Alcohol use: Yes     Comment: rarely   • Drug use: Never     No medications prior to admission.     Allergies:  Patient has no known allergies.    Review of Systems   Constitutional: Positive for activity change.   HENT: Negative.    Eyes: Negative.    Respiratory: Positive for chest tightness and shortness of breath.    Cardiovascular: Negative.  Negative for chest pain, palpitations and leg swelling.   Gastrointestinal: Negative for diarrhea, nausea and vomiting.   Endocrine: Negative.    Genitourinary: Negative.    Musculoskeletal: Negative for back pain, gait problem and myalgias.   Skin: Negative for rash and wound.   Allergic/Immunologic: Negative.    Neurological: Negative for dizziness, seizures, syncope, numbness and headaches.   Hematological: Negative.    Psychiatric/Behavioral: Negative.         Objective     Vital Signs  Temp:  [97.6 °F (36.4 °C)] 97.6 °F (36.4 °C)  Heart Rate:  [63] 63  Resp:  [16] 16  BP: (114-116)/(71-72) 114/72  SpO2:  [97 %] 97 %  on   ;    Device (Oxygen Therapy): room air  There is no height or weight on file to calculate BMI.    Physical Exam  Vitals reviewed.   Constitutional:       Appearance: Normal appearance.   HENT:      Head: Normocephalic.      Nose: Nose normal.      Mouth/Throat:      Mouth: Mucous membranes are dry.   Eyes:      Pupils: Pupils are equal, round, and reactive to light.   Cardiovascular:      Rate and Rhythm: Normal rate and regular rhythm.      Heart sounds: Murmur heard.     Friction rub present.   Pulmonary:      Effort: Pulmonary effort is normal.      Breath sounds: Normal breath sounds.   Abdominal:      General: Abdomen is flat.      Palpations: Abdomen is soft.   Musculoskeletal:         General: Normal range of motion.      Cervical back: Normal range of motion and neck supple.      Right lower leg: No edema.      Left lower leg: No edema.   Skin:     General: Skin is warm and dry.      Capillary Refill: Capillary refill takes less than 2 seconds.   Neurological:      General: No focal deficit present.      Mental Status: He is alert and oriented to person, place, and time. Mental status is at baseline.   Psychiatric:         Mood and Affect: Mood normal.         Behavior: Behavior normal.         Thought Content: Thought content normal.         Judgment: Judgment normal.         Results Review:  I reviewed the patient's new clinical results. Lab work/EKG  reviewed. UA negative. Carotid duplex showed normal carotids bilaterally. CXR without evidence of acute processes. COVID-19 screening pending.    WBC WBC   Date Value Ref Range Status   02/15/2023 6.92 3.40 - 10.80 10*3/mm3 Final      HGB Hemoglobin   Date Value Ref Range Status   02/15/2023 15.8 13.0 - 17.7 g/dL Final      HCT Hematocrit   Date Value Ref Range Status   02/15/2023 46.8 37.5 - 51.0 % Final      Platelets Platelets   Date Value Ref Range Status   02/15/2023 145 140 - 450 10*3/mm3 Final        PT/INR:    Protime   Date Value Ref Range Status    02/15/2023 14.2 11.7 - 14.2 Seconds Final   /  INR   Date Value Ref Range Status   02/15/2023 1.08 0.90 - 1.10 Final       Sodium Sodium   Date Value Ref Range Status   02/15/2023 138 136 - 145 mmol/L Final      Potassium Potassium   Date Value Ref Range Status   02/15/2023 4.2 3.5 - 5.2 mmol/L Final      Chloride Chloride   Date Value Ref Range Status   02/15/2023 104 98 - 107 mmol/L Final      Bicarbonate CO2   Date Value Ref Range Status   02/15/2023 28.2 22.0 - 29.0 mmol/L Final      BUN BUN   Date Value Ref Range Status   02/15/2023 11 6 - 20 mg/dL Final      Creatinine Creatinine   Date Value Ref Range Status   02/15/2023 1.01 0.76 - 1.27 mg/dL Final      Calcium Calcium   Date Value Ref Range Status   02/15/2023 9.1 8.6 - 10.5 mg/dL Final      Magnesium Magnesium   Date Value Ref Range Status   02/15/2023 2.0 1.6 - 2.6 mg/dL Final        Assessment & Plan  -Severe mitral valve regurgitation  -Marfan syndrome  -bicuspid aortic valve with mild insufficiency    Plan for mitral valve repair/replacement with Dr. Gann on Friday  Reiterated to patient not to take home medications the morning of surgery  Questions answered with verbalized understanding    GUILLERMINA Yuen  02/15/23  13:02 EST        Electronically signed by Matthew Gann MD at 02/18/23 1203                       Operative/Procedure Notes      Matthew Gann MD at 02/17/23 0737        Operative Note    Date of Dictation: 02/18/23    Date of Procedure: 02/17/23    Referring Physician: Wilmar Bernard MD    Preoperative diagnosis:   1.  Severe mitral regurgitation with prolapse  2.  Degenerative mitral valve disease  3.  Patent foramen ovale  4.  Mild aortic regurgitation  5.  Dyspnea of exertion  6.  Thrombocytopenia and presumably ITP  7.  Rule out hypogammaglobulinemia  8.  Presumed Marfan syndrome but no genetic testing    Postoperative diagnosis:   Same    Procedure:   1.  Complex mitral valve repair with a 42 mm Medtronic  flexible band and chordal repair of A2 segment (4-0 [place] x2) (CPT code 11662)  2.  Primary closure of patent foramen ovale (CPT code 32892)    Surgeon: Matthew Gann MD     Assistants: LISA De La Fuente Assistant: Epi Cochran PA-C was responsible for performing the following activities: Retraction, Suction, Irrigation, Suturing, Closing, Placing Dressing and all aspects of the surgical case and their skilled assistance was necessary for the success of this case.    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 The antibiotics and B blockers were given in the STS required window.  Counseling was given about diet, alcohol and tobacco use as needed.  End-of-life options were discussed          Description of the procedure:     The patient was placed supine on the operative table. Anesthesia was given and lines placed. The patient was prepped and draped using the usual sterile technique. A median sternotomy was performed with a scalpel and the layers carried down to the sternum using the electrocautery. The sternum was split in the midline using a vertical oscillating saw. Hemostasis was achieved. A Malvin retractor was placed and the anterior mediastinum was exposed. The pericardium was opened and edges tacked to the wound.  300 units of IV heparin was given to maintain the ACT over 400.  4-0 Prolene cannulation sutures were placed in the ascending aorta and right atrium. Small tip cannulas were placed and aorto right atrial cardiopulmonary bypass was started. Cardioplegia cannulas were placed and then  the ascending aorta was clamped. One liter of cold blood cardioplegia was given in an antegrade fashion to achieve diastolic arrest and further doses every 15 minutes thereafter also using retrograde infusion.  The left atrium was opened the right interatrial groove and incision was extended below each cava.  Self retracting blades were placed.  A large PFO was detected and was also detected in the JEFFERSON.  I closed the defect using a double layer of 4-0 Prolene continuous suture.  I systematically evaluated the mitral valve and I identified prolapse of the P2 segment with ruptured chordae.  The valve was myxomatous with a large anterior leaflet.  In my opinion it was repairable.    MV REPAIR:  The mitral valve was systematically evaluated to identify the etiology using valve hooks. The problem was Prolapse of the anterior leaflet. Then, 2.0 Ethibond suture were placed in a plicating fashion From trigone to trigone around the annulus. A ring was selected by measuring the inter-trigonal distance and the AP distance of the anterior leaflet. The sutures were passed through the42 mm ATS flexible band ([employer]esp[employer] Post) and the knots secured. The valve was tested by injecting saline and no residual leak was seen. Then the atrium was closed using a 3.0 prolene running sutures, the chamber de-aired and suture tied down.  Of note, I used the core knot device to secure the knots.  The patient was systemically rewarmed, I gave the warm dose of cardioplegia and then completion I remove the aortic root slang with steep suction on the aortic vent. The left pleural space was suctioned and the lungs ventilated. The heart was paced till regular atrial rhythm resumed. I allowed the heart to eject  and I de-aired the left heart chambers under JEFFERSON guidance and once hemodynamics were acceptable, then the CPB was discontinued and the venous and cardioplegia cannulas removed. The matching dose of protamine was given and the aortic cannula removed  "as well. AV temporary wires and pleural and mediastinal chest tubes were placed and the wound sprayed with platelet rich plasma. The perioperative JEFFERSON showed the ring well seated without leaks. The sternum was closed with single and double wires and soft tissue in layers of reabsorbable material. The wounds were covered with sterile dressings.       Specimen removed: None    CPB time: 81 minutes    Aortic clamp time: 53 minutes    Complications:  none           Disposition: Cardiovascular recovery room           Condition: Critical but stable.        Electronically signed by Matthew Gann MD at 02/18/23 1157          Physician Progress Notes (last 72 hours)      Lindsey Jung APRN at 02/19/23 0714           LOS: 2 days   Patient Care Team:  Donal Hutchison MD as PCP - General (Internal Medicine)  ADRIANNA Bernard MD as Consulting Physician (Cardiology)    Chief Complaint: post op    Subjective:  Symptoms:  He reports chest pain.  No shortness of breath.    Diet:  Adequate intake.  No nausea or vomiting.    Activity level: Impaired due to pain.    Pain:  He complains of pain that is mild.  Pain is well controlled.      Vital Signs  Temp:  [95.7 °F (35.4 °C)-98.9 °F (37.2 °C)] 98.9 °F (37.2 °C)  Heart Rate:  [82-89] 89  Resp:  [16-18] 17  BP: ()/(55-76) 115/68  Arterial Line BP: ()/(56-70) 98/59  Body mass index is 21.09 kg/m².    Intake/Output Summary (Last 24 hours) at 2/19/2023 0714  Last data filed at 2/19/2023 0405  Gross per 24 hour   Intake 1636.53 ml   Output 4295 ml   Net -2658.47 ml     No intake/output data recorded.    Chest tube drainage last 8 hours: 100        02/17/23  0559 02/18/23  0600   Weight: 83 kg (183 lb) 82.8 kg (182 lb 8.7 oz)     Objective:  General Appearance:  Comfortable and in no acute distress.    Vital signs: (most recent): Blood pressure 115/68, pulse 89, temperature 98.9 °F (37.2 °C), temperature source Oral, resp. rate 17, height 198.1 cm (78\"), weight 82.8 " kg (182 lb 8.7 oz), SpO2 91 %.  Vital signs are normal.  No fever.    Output: Producing urine and no stool output.    Lungs:  Normal effort and normal respiratory rate.  There are rales and decreased breath sounds.    Heart: Normal rate.  Regular rhythm.  Positive for friction rub.  (SR in 80s)  Abdomen: Abdomen is soft.  Hypoactive bowel sounds.     Pulses: Distal pulses are intact.    Neurological: Patient is alert and oriented to person, place and time.    Skin:  Warm and dry.  (Sternal dressing clean, dry, and intact)    Results Review:        WBC WBC   Date Value Ref Range Status   02/19/2023 14.11 (H) 3.40 - 10.80 10*3/mm3 Final   02/18/2023 30.58 (C) 3.40 - 10.80 10*3/mm3 Final   02/17/2023 27.78 (H) 3.40 - 10.80 10*3/mm3 Final   02/17/2023 19.24 (H) 3.40 - 10.80 10*3/mm3 Final   02/17/2023 5.72 3.40 - 10.80 10*3/mm3 Final   02/17/2023 3.45 3.40 - 10.80 10*3/mm3 Final      HGB Hemoglobin   Date Value Ref Range Status   02/19/2023 10.3 (L) 13.0 - 17.7 g/dL Final   02/18/2023 11.2 (L) 13.0 - 17.7 g/dL Final   02/17/2023 11.8 (L) 13.0 - 17.7 g/dL Final   02/17/2023 12.8 (L) 13.0 - 17.7 g/dL Final   02/17/2023 12.5 (L) 13.0 - 17.7 g/dL Final   02/17/2023 12.4 (L) 13.0 - 17.7 g/dL Final   02/17/2023 11.6 (L) 12.0 - 17.0 g/dL Final   02/17/2023 12.2 12.0 - 17.0 g/dL Final   02/17/2023 13.3 12.0 - 17.0 g/dL Final   02/17/2023 11.9 (L) 12.0 - 17.0 g/dL Final   02/17/2023 12.9 12.0 - 17.0 g/dL Final      HCT Hematocrit   Date Value Ref Range Status   02/19/2023 30.6 (L) 37.5 - 51.0 % Final   02/18/2023 32.5 (L) 37.5 - 51.0 % Final   02/17/2023 33.8 (L) 37.5 - 51.0 % Final   02/17/2023 36.3 (L) 37.5 - 51.0 % Final   02/17/2023 37.1 (L) 37.5 - 51.0 % Final   02/17/2023 36.5 (L) 37.5 - 51.0 % Final   02/17/2023 34 (L) 38 - 51 % Final   02/17/2023 36 (L) 38 - 51 % Final   02/17/2023 39 38 - 51 % Final   02/17/2023 35 (L) 38 - 51 % Final   02/17/2023 38 38 - 51 % Final      Platelets Platelets   Date Value Ref Range Status    02/19/2023 91 (L) 140 - 450 10*3/mm3 Final   02/18/2023 141 140 - 450 10*3/mm3 Final   02/17/2023 140 140 - 450 10*3/mm3 Final   02/17/2023 122 (L) 140 - 450 10*3/mm3 Final   02/17/2023 125 (L) 140 - 450 10*3/mm3 Final   02/17/2023 125 (L) 140 - 450 10*3/mm3 Final   02/17/2023 89 (L) 140 - 450 10*3/mm3 Final        PT/INR:    Protime   Date Value Ref Range Status   02/17/2023 17.9 (H) 11.7 - 14.2 Seconds Final   02/17/2023 17.4 (H) 11.7 - 14.2 Seconds Final   02/17/2023 17.9 (H) 12.8 - 15.2 seconds Final     Comment:     Serial Number: [id number]:  [id number]   02/17/2023 18.8 (H) 11.7 - 14.2 Seconds Final   /  INR   Date Value Ref Range Status   02/17/2023 1.46 (H) 0.90 - 1.10 Final   02/17/2023 1.41 (H) 0.90 - 1.10 Final   02/17/2023 1.5 (H) 0.8 - 1.2 Final   02/17/2023 1.55 (H) 0.90 - 1.10 Final       Sodium Sodium   Date Value Ref Range Status   02/19/2023 136 136 - 145 mmol/L Final   02/18/2023 137 136 - 145 mmol/L Final   02/18/2023 137 136 - 145 mmol/L Final   02/17/2023 134 (L) 136 - 145 mmol/L Final   02/17/2023 140 136 - 145 mmol/L Final   02/17/2023 139 136 - 145 mmol/L Final      Potassium Potassium   Date Value Ref Range Status   02/19/2023 3.6 3.5 - 5.2 mmol/L Final   02/18/2023 4.5 3.5 - 5.2 mmol/L Final   02/18/2023 5.3 (H) 3.5 - 5.2 mmol/L Final   02/17/2023 5.2 3.5 - 5.2 mmol/L Final   02/17/2023 3.8 3.5 - 5.2 mmol/L Final   02/17/2023 4.2 3.5 - 5.2 mmol/L Final     Comment:     Slight hemolysis detected by analyzer. Results may be affected.      Chloride Chloride   Date Value Ref Range Status   02/19/2023 102 98 - 107 mmol/L Final   02/18/2023 102 98 - 107 mmol/L Final   02/18/2023 107 98 - 107 mmol/L Final   02/17/2023 106 98 - 107 mmol/L Final   02/17/2023 110 (H) 98 - 107 mmol/L Final   02/17/2023 109 (H) 98 - 107 mmol/L Final      Bicarbonate CO2   Date Value Ref Range Status   02/19/2023 27.9 22.0 - 29.0 mmol/L Final   02/18/2023 24.6 22.0 - 29.0 mmol/L Final   02/18/2023 23.0 22.0 - 29.0  mmol/L Final   02/17/2023 21.8 (L) 22.0 - 29.0 mmol/L Final   02/17/2023 22.0 22.0 - 29.0 mmol/L Final   02/17/2023 22.0 22.0 - 29.0 mmol/L Final      BUN BUN   Date Value Ref Range Status   02/19/2023 11 6 - 20 mg/dL Final   02/18/2023 10 6 - 20 mg/dL Final   02/18/2023 9 6 - 20 mg/dL Final   02/17/2023 9 6 - 20 mg/dL Final   02/17/2023 9 6 - 20 mg/dL Final   02/17/2023 10 6 - 20 mg/dL Final      Creatinine Creatinine   Date Value Ref Range Status   02/19/2023 0.89 0.76 - 1.27 mg/dL Final   02/18/2023 0.92 0.76 - 1.27 mg/dL Final   02/18/2023 0.93 0.76 - 1.27 mg/dL Final   02/17/2023 0.94 0.76 - 1.27 mg/dL Final   02/17/2023 0.93 0.76 - 1.27 mg/dL Final   02/17/2023 0.98 0.76 - 1.27 mg/dL Final      Calcium Calcium   Date Value Ref Range Status   02/19/2023 8.3 (L) 8.6 - 10.5 mg/dL Final   02/18/2023 8.3 (L) 8.6 - 10.5 mg/dL Final   02/18/2023 7.9 (L) 8.6 - 10.5 mg/dL Final   02/17/2023 7.9 (L) 8.6 - 10.5 mg/dL Final   02/17/2023 8.6 8.6 - 10.5 mg/dL Final   02/17/2023 8.0 (L) 8.6 - 10.5 mg/dL Final      Magnesium Magnesium   Date Value Ref Range Status   02/18/2023 2.5 1.6 - 2.6 mg/dL Final   02/17/2023 3.0 (H) 1.6 - 2.6 mg/dL Final   02/17/2023 2.0 1.6 - 2.6 mg/dL Final   02/17/2023 2.5 1.6 - 2.6 mg/dL Final          aspirin, 81 mg, Oral, Daily  chlorhexidine, 15 mL, Mouth/Throat, Q12H  enoxaparin, 40 mg, Subcutaneous, Daily  folic acid (FOLVITE) IVPB, 1 mg, Intravenous, Daily  guaiFENesin, 1,200 mg, Oral, Q12H  insulin lispro, 0-9 Units, Subcutaneous, 4x Daily With Meals & Nightly  metoprolol tartrate, 12.5 mg, Oral, Q12H  mupirocin, , Each Nare, BID  pantoprazole, 40 mg, Intravenous, Once   Followed by  pantoprazole, 40 mg, Oral, QAM  Scopolamine, 1 patch, Transdermal, Q72H  senna-docusate sodium, 2 tablet, Oral, Nightly      clevidipine, 2-32 mg/hr  dexmedetomidine, 0.2-1.5 mcg/kg/hr, Last Rate: Stopped (02/17/23 2607)  DOPamine, 2-20 mcg/kg/min  EPINEPHrine, 0.02-0.1 mcg/kg/min  milrinone, 0.25-0.375  mcg/kg/min  niCARdipine, 5-15 mg/hr, Last Rate: Stopped (02/17/23 1130)  nitroglycerin, 5-200 mcg/min  norepinephrine, 0.02-0.2 mcg/kg/min, Last Rate: Stopped (02/18/23 1116)  phenylephrine, 0.2-2 mcg/kg/min, Last Rate: Stopped (02/17/23 2100)  propofol, 5-50 mcg/kg/min, Last Rate: Stopped (02/17/23 1519)  sodium chloride, 30 mL/hr, Last Rate: 30 mL/hr (02/17/23 1151)        Patient Active Problem List   Diagnosis Code   • VHD (valvular heart disease) I38   • Marfan syndrome Q87.40   • Severe mitral regurgitation I34.0   • Bicuspid aortic valve Q23.1   • Dyspnea on effort R06.09   • Chest pain, atypical R07.89   • Mitral valve insufficiency, unspecified etiology I34.0       Assessment & Plan   - Severe mitral valve regurgitation s/p MV repair POD#2 Azeem  - Marfan syndrome  - bicuspid aortic valve with mild insufficiency  - ITP; hematology following  - post op anemia--expected acute blood loss  - leukocytosis--reactive/intraoperative steroids; trending down    Looks good this morning, sitting up in the chair  Remains in SR with rates in the 80s. Increase beta blocker  CXR with persistent vascular congestion--IV diurese again today. Replete electrolytes  Interval increase in right apical PTX this am. No air leak on exam. Discussed with Dr. Gann will clamp chest tube and repeat CXR. If no change, will likely remove chest tubes this afternoon  Weaned to RA and tolerating   +rub on exam. EKG consistent with possible pericarditis. Will discuss with Dr. Gann  Platelet count down to 91K this morning. Will hold lovenox  Mobilize/aggressive pulmonary toilet  Discontinue central line and adam catheter  Continue routine care    GUILLERMINA Yuen  02/19/23  07:14 EST    Electronically signed by Lindsey Jung APRN at 02/19/23 0816     Christiano David MD at 02/18/23 1546           LOS: 1 day   Patient Care Team:  Donal Hutchison MD as PCP - General (Internal Medicine)  ADRIANNA Bernard MD as Consulting  Physician (Cardiology)    Chief Complaint: Follow-up mitral valve repair, Marfan syndrome, postoperative ventricular tachycardia.    Interval History: Rhythm has calmed down significantly.  No further VT this morning.  Drips have been adjusted.  He feels weak, but no other acute events.  He is in sinus bradycardia under the pacemaker.    Vital Signs:  Temp:  [95.7 °F (35.4 °C)-98.8 °F (37.1 °C)] 97.3 °F (36.3 °C)  Heart Rate:  [81-88] 88  Resp:  [12-18] 18  BP: ()/(42-84) 98/65  Arterial Line BP: ()/(46-72) 99/59  FiO2 (%):  [39 %] 39 %    Intake/Output Summary (Last 24 hours) at 2/18/2023 1546  Last data filed at 2/18/2023 1200  Gross per 24 hour   Intake 4832.6 ml   Output 3640 ml   Net 1192.6 ml       Physical Exam:   General Appearance:    No acute distress, alert and oriented x4   Lungs:     Clear to auscultation bilaterally     Heart:    Regular rhythm and normal rate.  No murmurs, gallops, or       rubs.   Abdomen:     Soft, nontender, nondistended.    Extremities:   Moves all extremities well.  No clubbing, cyanosis, or edema.     Results Review:    Results from last 7 days   Lab Units 02/18/23  1209   SODIUM mmol/L 137   POTASSIUM mmol/L 4.5   CHLORIDE mmol/L 102   CO2 mmol/L 24.6   BUN mg/dL 10   CREATININE mg/dL 0.92   GLUCOSE mg/dL 127*   CALCIUM mg/dL 8.3*         Results from last 7 days   Lab Units 02/18/23  0310   WBC 10*3/mm3 30.58*   HEMOGLOBIN g/dL 11.2*   HEMATOCRIT % 32.5*   PLATELETS 10*3/mm3 141     Results from last 7 days   Lab Units 02/17/23  2204 02/17/23  1118 02/17/23  0952 02/17/23  0945 02/15/23  0948   INR  1.46* 1.41* 1.5* 1.55* 1.08   APTT seconds  --  31.4  --  31.3 28.7     Results from last 7 days   Lab Units 02/15/23  0948   CHOLESTEROL mg/dL 146     Results from last 7 days   Lab Units 02/18/23  0310   MAGNESIUM mg/dL 2.5     Results from last 7 days   Lab Units 02/15/23  0948   CHOLESTEROL mg/dL 146   TRIGLYCERIDES mg/dL 71   HDL CHOL mg/dL 47   LDL CHOL mg/dL 85        I reviewed the patient's new clinical results.        Assessment:  1.  Symptomatic severe mitral regurgitation, status post mitral valve repair on 2/17/2023 by Dr. Gann.  2.  Marfan syndrome  3.  Bicuspid aortic valve with mild aortic insufficiency  4.  History of either TIA or small stroke  5.  Postoperative ventricular tachycardia  6.  Chronic mild thrombocytopenia secondary to ITP  7.  Expected postoperative anemia  8.  Reactive leukocytosis secondary to intraoperative steroids    Plan:  -Doing much better from a rhythm standpoint.  The ventricular tachycardia has largely resolved.  Amiodarone decreased to 0.5 mg/min.  Lidocaine drip has been stopped.  QT interval okay on EKG.    -Sinus bradycardia underlying pacing.  Continue metoprolol 12.5 mg every 12 hours if blood pressure tolerates.    -Wean Levophed as tolerated.    -Hematology following for thrombocytopenia.  Platelet count 141 today.    -Lasix 40 mg IV given today.  Continue watch volume status.      Christiano David MD  02/18/23  15:46 EST        Electronically signed by Christiano David MD at 02/18/23 1609     Vicente Whitley MD at 02/18/23 1332            Subjective  1/ mild thrombocytopenia after mitral valve replacement, pt has had sepsis before and had DIC AND PLAT COUNT OF 20 THOUSAND 3 YEARS AGO, CHRONIC LOW LEVEL THROMBOCYTOPENIA.     2. AS A CHILD MULTIPLE HOSPITAL ADMISSIONS FOR REPETITIVE INFECTIONS: QUESTION COMBINED IMMUNODEFICIENCY.     3. OSTEOPOROSIS AS A CHILD AND FRACTURED BONES: WHAT KIND OF GENETIC BONE DISORDER IN A PATIENT WITH MARFAN LIKE SYNDROME?.     4. QUESTION RHEUMATIC FEVER . HEART MURMUR PRESENT ONLY  AT AGE 11 AFTER MULTIPLE INFECTIONS.        History of Present Illness  On 02/17/2023 this 37-year-old male has been admitted to the hospital the day before to proceed with mitral valve replacement therapy by Dr. Gann that has been accomplished this morning. We have been consulted because the patient has mild  thrombocytopenia. It seems according to the patient's mother with whom I have had an extensive discussion today that he has had thrombocytopenia in the past on multiple occasions, the most severe one 3 years ago when he developed septicemia as a complication of a tooth abscess and ended up unconscious at home and taken to the hospital with septic syndrome requiring multiple blood products administration, mechanical intubation with resuscitation and aggressive antibiotic therapy with resolution.      The patient has had in laboratory testing done before and available and posted below mild degree of variable thrombocytopenia.      Talking to the patient's mother as well the patient had multiple infections as an infant until he was 5 years old with multiple admissions to the hospital with respiratory infections of all sorts. He was in and out of the hospital all the time. Besides this a murmur was detected in his heart at age 11 and when I mentioned rheumatic fever the patient's mother opened her eyes and said that she probably was that he had this condition then. Obviously raises the question if the murmur is related to a genetic defect or things of this nature or was acquired condition. In any event, he has undergone the replacement of this.      The patient's mother also mentions the fact that the patient was diagnosed with osteoporosis as a child having multiple bone fractures with no major trauma to trigger the phenomenon. This is not enough to call this in my knowledge, osteogenesis imperfecta. 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   On 02/18/2023 the patient was further reviewed. Since yesterday he has been extubated and now he is sitting in a recliner chair. He has not had any external bleeding. I discussed the case with Matthew Gann MD, who mentioned that before the patient's surgery was completed he  bled, bled and bled through surgical sites in multiple areas. He received multiple blood products finally stopping bleeding. Raises the question about platelet dysfunction. His PT and PTT before the surgery were normal.     Today besides this the patient has been able to talk, he has expected pain, he has no nausea or vomiting, his vital signs are stable. His phosphorus has been rechecked after phosphorus infusion yesterday, now is normal but this will require to be checked on daily basis. He eventually required the initiation of vitamin D that also was low.    ·   Past Medical History, Past Surgical History, Social History, Family History have been reviewed and are without significant changes except as mentioned.    Review of Systems   Constitutional: Positive for activity change and fatigue.   HENT: Negative.    Respiratory: Positive for cough and shortness of breath.    Cardiovascular: Negative.    Gastrointestinal: Negative.    Genitourinary: Negative.    Musculoskeletal: Negative.    Skin: Negative.    Neurological: Negative.    Hematological: Negative.    Psychiatric/Behavioral: Negative.         Medications:  The current medication list was reviewed in the EMR    ALLERGIES:  No Known Allergies    Objective      Vitals:    02/18/23 0800 02/18/23 0900 02/18/23 1100 02/18/23 1200   BP: 113/69 107/76 103/66 98/65   BP Location: Right arm Right arm Right arm Right arm   Patient Position: Sitting Sitting Sitting Sitting   Pulse: 88 88 88 88   Resp: 16 16 16 18   Temp: 96.6 °F (35.9 °C) 95.7 °F (35.4 °C) 97.2 °F (36.2 °C) 97.3 °F (36.3 °C)   TempSrc: Core Core Bladder Bladder   SpO2: 100% 100% 99% 98%   Weight:       Height:         Current Status 1/18/2022   ECOG score 0       Physical Exam  Constitutional:       Appearance: He is ill-appearing.   Eyes:      General: No scleral icterus.  Cardiovascular:      Rate and Rhythm: Normal rate and regular rhythm.      Pulses: Normal pulses.      Heart sounds: Normal heart  sounds. No murmur heard.    No gallop.   Pulmonary:      Effort: Pulmonary effort is normal. No respiratory distress.      Breath sounds: Normal breath sounds. No wheezing or rales.   Abdominal:      General: Abdomen is flat. There is no distension.      Tenderness: There is no abdominal tenderness. There is no guarding or rebound.   Musculoskeletal:      Right lower leg: No edema.      Left lower leg: No edema.   Lymphadenopathy:      Cervical: No cervical adenopathy.   Skin:     General: Skin is warm and dry.   Neurological:      Mental Status: He is alert. Mental status is at baseline.           RECENT LABS:  Hematology WBC   Date Value Ref Range Status   02/18/2023 30.58 (C) 3.40 - 10.80 10*3/mm3 Final     RBC   Date Value Ref Range Status   02/18/2023 3.75 (L) 4.14 - 5.80 10*6/mm3 Final     Hemoglobin   Date Value Ref Range Status   02/18/2023 11.2 (L) 13.0 - 17.7 g/dL Final     Hematocrit   Date Value Ref Range Status   02/18/2023 32.5 (L) 37.5 - 51.0 % Final     Platelets   Date Value Ref Range Status   02/18/2023 141 140 - 450 10*3/mm3 Final          Assessment & Plan    On 02/17/2023 the patient has very mild degree of thrombocytopenia, 125,000 and 89,000 but reviewing the platelet count during the last 3 years there is always a fluctuation in the number most of the time. It raises the question if this patient has some chronic phenomenon more than acute phenomenon just exacerbated by his recent heart operation. The surgical site is completely dry. The catheter and all the lines are completely dry. He has no clinical bleeding and this will be further investigated with an immature platelet fraction. I doubt that LDH is going to be of benefit at this point after such a major intervention. It is going to be some degree of hemolysis anyway and LDH is not going to provide any information. I think it is worth it to do a B12 and folic acid levels.      Eventually in the future when the patient is better we would  like to do at least a CT scan of the abdomen to evaluate spleen and liver and see if there is any evidence of chronic splenomegaly. The clinical examination makes me to think that maybe indeed the spleen is mildly enlarged but I am not compromising with that physical finding.      Therefore, the platelet count will be watched in absence of any other intervention. Obviously with this kind of number and no bleeding there is no need for blood products.   On further questioning and discussion with Matthew Gann MD, on 02/18/2023 he documented that after completion of the surgery the patient continued bleeding, bleeding and oozing from multiple sites. He received multiple blood products, finally the bleeding stopped. That raises the question if the patient has some sort of narrative factor that provides him with coagulation issues. I think these numbers eventually will need to be rechecked in a baseline situation in the future. Today he has no external bleeding at the surgical site and none of the lines are bleeding. He has no external bleeding anywhere. I think it is worth it to check platelet aggregation studies.    ·   2.  The patient according to the mother had multiple admissions to the hospital as an infant to the point that he used to live in the hospital most of the time in and out and when I mentioned issues pertinent to rheumatic fever she opened her eyes and said likely he had this condition. In any event, this patient is now 37 years old and obviously raises the question given the morphological alterations if this patient has some sort of immunodeficiency and severe combined immunodeficiency. I think it is even worth it to check a level if immunoglobulin G, A and M for baseline.      The patient's white count, white count differential were normal.    On 02/18/2023 it is obvious that the patient has had multiple infections as a child. 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 I think he will benefit from immunoglobulin dose especially now that he is postsurgical time.    ·   3.  The other issue that this patient has had is multiple fractures when he was a young age with no major trauma to trigger this. The patient's mother states that he was called osteoporotic then and raises the question about the nature of this and if he has some form of congenital metabolic bone disorder of some sort not bad enough to call this osteogenesis imperfecta but enough to trigger this phenomenon. I think it is worth it to at least to do a vitamin D, magnesium, phosphorus level and a PTH.   On 02/18/2023 it is very obvious that the patient had hypophosphatemia, also low level of vitamin D. His phosphate was replaced yesterday. The phosphate level today is normal. He will require eventually initiation of vitamin D supplementation. This will require to be reassessed as well in the future. His PTH was minimally elevated and I think this could obey also to the fact that he has some element of hypocalcemia.     ·      4.  On 02/17/2023 this patient was further reviewed in regard to so many other issues. It caught my attention his very long fingers in his hand. He had hyperelasticity according to the mother when he was a young child. 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 Raises the question if he has Marfanoid issues and eventually he needs to be seen by genetics in regard to analysis of this situation. This could be eventually scheduled.      His mother was actually very appreciative of my conversation with her and the interaction in regard to these other issues that are not precisely pertinent to his thrombocytopenia but had issues that needed to be addressed in some way or the other. 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  On 02/18/2023 the patient eventually will require real global assessment with endocrinological workup and genetics referral.     ·   5.  Further reviewing this patient he has a very low vitamin D level that will require to have correction orally once that he is able to receive food and medication by mouth. The patient has profound hypophosphatemia. This could have a lot to do with issues in regard to metabolism and furthermore this degree of hypophosphatemia can lead into hemolytic anemia because of lack of energy in the red cell membrane to keep integrity of the red corpuscle. He will require aggressive phosphate replacement therapy as soon as now. Furthermore the patient has very significant folate deficiency and his level of vitamin B12 is very marginal. I will initiate replacement therapy for this.      All these issues are very striking. His PTH level is also elevated and this probably announced the need for this patient eventually to be seen also at Endocrinology. They do not do inpatient hospital visits anymore.   On 02/18/2023 the patient received yesterday during the surgical intervention Solu-Medrol IV. Today his white count is 30,000, I think this is a reflection of steroid stimulus into the release of marginal population of neutrophils into the peripheral blood. Therefore this per se is not an indicator of infection or anything else, neither leukemia. The hemoglobin remains stable, the platelet count is 141,000 today.    On 02/18/2023 I discussed the case with Matthew Gann MD.         DURING THE VISIT WITH THE PATIENT TODAY , PATIENT HAD FACE MASK, I HAD PROPER PROTECTIVE EQUIPMENT, AND I DID HAND HYGIENE WITH SOAP AND WATER BEFORE AND AFTER THE VISIT.                  2/18/2023      CC:            Electronically signed by Vicente Whitley MD at 02/18/23 2085     Lindsey Jung APRN at 02/18/23 5517            "LOS: 1 day   Patient Care Team:  Donal Hutchison MD as PCP - General (Internal Medicine)  ADRIANNA Bernard MD as Consulting Physician (Cardiology)    Chief Complaint: post op    Subjective:  Symptoms:  He reports chest pain.  No shortness of breath.    Diet:  Poor intake.  He reports  nausea and vomiting.    Activity level: Impaired due to pain.    Pain:  He complains of pain that is moderate.  Pain is partially controlled.      Vital Signs  Temp:  [96.1 °F (35.6 °C)-98.8 °F (37.1 °C)] 97.2 °F (36.2 °C)  Heart Rate:  [80-89] 88  Resp:  [12-18] 16  BP: ()/(42-84) 103/72  Arterial Line BP: ()/(46-72) 91/63  FiO2 (%):  [39 %-99 %] 39 %  Body mass index is 21.09 kg/m².    Intake/Output Summary (Last 24 hours) at 2/18/2023 0659  Last data filed at 2/18/2023 0600  Gross per 24 hour   Intake 5846.3 ml   Output 4620 ml   Net 1226.3 ml     I/O this shift:  In: 1410 [P.O.:180; I.V.:730; IV Piggyback:500]  Out: 725 [Urine:515; Emesis/NG output:100; Chest Tube:110]    Chest tube drainage last 8 hours: 50        02/17/23  0559 02/18/23  0600   Weight: 83 kg (183 lb) 82.8 kg (182 lb 8.7 oz)         Objective:  General Appearance:  Comfortable and in no acute distress.    Vital signs: (most recent): Blood pressure 103/72, pulse 88, temperature 97.2 °F (36.2 °C), resp. rate 16, height 198.1 cm (78\"), weight 82.8 kg (182 lb 8.7 oz), SpO2 100 %.  Vital signs are normal.  No fever.    Output: Producing urine and no stool output.    Lungs:  Normal effort and normal respiratory rate.  There are rales and decreased breath sounds.    Heart: Normal rate.  Regular rhythm.  (100% AV paced. SR/SB in 50s underlying)  Abdomen: Abdomen is soft.  Hypoactive bowel sounds.     Pulses: Distal pulses are intact.    Neurological: Patient is alert and oriented to person, place and time.    Skin:  Warm and dry.  (Sternal dressing clean, dry, and intact)      Results Review:        WBC WBC   Date Value Ref Range Status   02/18/2023 30.58 (C) " 3.40 - 10.80 10*3/mm3 Final   02/17/2023 27.78 (H) 3.40 - 10.80 10*3/mm3 Final   02/17/2023 19.24 (H) 3.40 - 10.80 10*3/mm3 Final   02/17/2023 5.72 3.40 - 10.80 10*3/mm3 Final   02/17/2023 3.45 3.40 - 10.80 10*3/mm3 Final   02/15/2023 6.92 3.40 - 10.80 10*3/mm3 Final      HGB Hemoglobin   Date Value Ref Range Status   02/18/2023 11.2 (L) 13.0 - 17.7 g/dL Final   02/17/2023 11.8 (L) 13.0 - 17.7 g/dL Final   02/17/2023 12.8 (L) 13.0 - 17.7 g/dL Final   02/17/2023 12.5 (L) 13.0 - 17.7 g/dL Final   02/17/2023 12.4 (L) 13.0 - 17.7 g/dL Final   02/17/2023 11.6 (L) 12.0 - 17.0 g/dL Final   02/17/2023 12.2 12.0 - 17.0 g/dL Final   02/17/2023 13.3 12.0 - 17.0 g/dL Final   02/17/2023 11.9 (L) 12.0 - 17.0 g/dL Final   02/17/2023 12.9 12.0 - 17.0 g/dL Final   02/15/2023 15.8 13.0 - 17.7 g/dL Final      HCT Hematocrit   Date Value Ref Range Status   02/18/2023 32.5 (L) 37.5 - 51.0 % Final   02/17/2023 33.8 (L) 37.5 - 51.0 % Final   02/17/2023 36.3 (L) 37.5 - 51.0 % Final   02/17/2023 37.1 (L) 37.5 - 51.0 % Final   02/17/2023 36.5 (L) 37.5 - 51.0 % Final   02/17/2023 34 (L) 38 - 51 % Final   02/17/2023 36 (L) 38 - 51 % Final   02/17/2023 39 38 - 51 % Final   02/17/2023 35 (L) 38 - 51 % Final   02/17/2023 38 38 - 51 % Final   02/15/2023 46.8 37.5 - 51.0 % Final      Platelets Platelets   Date Value Ref Range Status   02/18/2023 141 140 - 450 10*3/mm3 Final   02/17/2023 140 140 - 450 10*3/mm3 Final   02/17/2023 122 (L) 140 - 450 10*3/mm3 Final   02/17/2023 125 (L) 140 - 450 10*3/mm3 Final   02/17/2023 125 (L) 140 - 450 10*3/mm3 Final   02/17/2023 89 (L) 140 - 450 10*3/mm3 Final   02/15/2023 145 140 - 450 10*3/mm3 Final        PT/INR:    Protime   Date Value Ref Range Status   02/17/2023 17.9 (H) 11.7 - 14.2 Seconds Final   02/17/2023 17.4 (H) 11.7 - 14.2 Seconds Final   02/17/2023 17.9 (H) 12.8 - 15.2 seconds Final     Comment:     Serial Number: [id number]:  [id number]   02/17/2023 18.8 (H) 11.7 - 14.2 Seconds Final   02/15/2023  14.2 11.7 - 14.2 Seconds Final   /  INR   Date Value Ref Range Status   02/17/2023 1.46 (H) 0.90 - 1.10 Final   02/17/2023 1.41 (H) 0.90 - 1.10 Final   02/17/2023 1.5 (H) 0.8 - 1.2 Final   02/17/2023 1.55 (H) 0.90 - 1.10 Final   02/15/2023 1.08 0.90 - 1.10 Final       Sodium Sodium   Date Value Ref Range Status   02/18/2023 137 136 - 145 mmol/L Final   02/17/2023 134 (L) 136 - 145 mmol/L Final   02/17/2023 140 136 - 145 mmol/L Final   02/17/2023 139 136 - 145 mmol/L Final   02/15/2023 138 136 - 145 mmol/L Final      Potassium Potassium   Date Value Ref Range Status   02/18/2023 5.3 (H) 3.5 - 5.2 mmol/L Final   02/17/2023 5.2 3.5 - 5.2 mmol/L Final   02/17/2023 3.8 3.5 - 5.2 mmol/L Final   02/17/2023 4.2 3.5 - 5.2 mmol/L Final     Comment:     Slight hemolysis detected by analyzer. Results may be affected.   02/15/2023 4.2 3.5 - 5.2 mmol/L Final      Chloride Chloride   Date Value Ref Range Status   02/18/2023 107 98 - 107 mmol/L Final   02/17/2023 106 98 - 107 mmol/L Final   02/17/2023 110 (H) 98 - 107 mmol/L Final   02/17/2023 109 (H) 98 - 107 mmol/L Final   02/15/2023 104 98 - 107 mmol/L Final      Bicarbonate CO2   Date Value Ref Range Status   02/18/2023 23.0 22.0 - 29.0 mmol/L Final   02/17/2023 21.8 (L) 22.0 - 29.0 mmol/L Final   02/17/2023 22.0 22.0 - 29.0 mmol/L Final   02/17/2023 22.0 22.0 - 29.0 mmol/L Final   02/15/2023 28.2 22.0 - 29.0 mmol/L Final      BUN BUN   Date Value Ref Range Status   02/18/2023 9 6 - 20 mg/dL Final   02/17/2023 9 6 - 20 mg/dL Final   02/17/2023 9 6 - 20 mg/dL Final   02/17/2023 10 6 - 20 mg/dL Final   02/15/2023 11 6 - 20 mg/dL Final      Creatinine Creatinine   Date Value Ref Range Status   02/18/2023 0.93 0.76 - 1.27 mg/dL Final   02/17/2023 0.94 0.76 - 1.27 mg/dL Final   02/17/2023 0.93 0.76 - 1.27 mg/dL Final   02/17/2023 0.98 0.76 - 1.27 mg/dL Final   02/15/2023 1.01 0.76 - 1.27 mg/dL Final      Calcium Calcium   Date Value Ref Range Status   02/18/2023 7.9 (L) 8.6 - 10.5  mg/dL Final   02/17/2023 7.9 (L) 8.6 - 10.5 mg/dL Final   02/17/2023 8.6 8.6 - 10.5 mg/dL Final   02/17/2023 8.0 (L) 8.6 - 10.5 mg/dL Final   02/15/2023 9.1 8.6 - 10.5 mg/dL Final      Magnesium Magnesium   Date Value Ref Range Status   02/18/2023 2.5 1.6 - 2.6 mg/dL Final   02/17/2023 3.0 (H) 1.6 - 2.6 mg/dL Final   02/17/2023 2.0 1.6 - 2.6 mg/dL Final   02/17/2023 2.5 1.6 - 2.6 mg/dL Final   02/15/2023 2.0 1.6 - 2.6 mg/dL Final          aspirin, 81 mg, Oral, Daily  calcium gluconate, 2 g, Intravenous, Once  ceFAZolin, 2 g, Intravenous, Q8H  chlorhexidine, 15 mL, Mouth/Throat, Q12H  enoxaparin, 40 mg, Subcutaneous, Daily  folic acid (FOLVITE) IVPB, 1 mg, Intravenous, Daily  furosemide, 40 mg, Intravenous, Once  magnesium sulfate, 2 g, Intravenous, Q8H  metoclopramide, 10 mg, Intravenous, Q6H  metoprolol tartrate, 12.5 mg, Oral, Q12H  mupirocin, , Each Nare, BID  pantoprazole, 40 mg, Intravenous, Once   Followed by  pantoprazole, 40 mg, Oral, QAM  senna-docusate sodium, 2 tablet, Oral, Nightly      amiodarone, 1 mg/min, Last Rate: 1 mg/min (02/18/23 0540)  clevidipine, 2-32 mg/hr  dexmedetomidine, 0.2-1.5 mcg/kg/hr, Last Rate: Stopped (02/17/23 1267)  DOPamine, 2-20 mcg/kg/min  EPINEPHrine, 0.02-0.1 mcg/kg/min  insulin, 0-100 Units/hr, Last Rate: 0.6 Units/hr (02/18/23 0649)  lidocaine cardiac, 2 mg/min, Last Rate: 2 mg/min (02/18/23 0658)  milrinone, 0.25-0.375 mcg/kg/min  niCARdipine, 5-15 mg/hr, Last Rate: Stopped (02/17/23 1130)  nitroglycerin, 5-200 mcg/min  norepinephrine, 0.02-0.2 mcg/kg/min, Last Rate: 0.03 mcg/kg/min (02/18/23 0300)  phenylephrine, 0.2-2 mcg/kg/min, Last Rate: Stopped (02/17/23 2100)  propofol, 5-50 mcg/kg/min, Last Rate: Stopped (02/17/23 1519)  sodium chloride, 30 mL/hr, Last Rate: 30 mL/hr (02/17/23 1151)        Patient Active Problem List   Diagnosis Code   • VHD (valvular heart disease) I38   • Marfan syndrome Q87.40   • Severe mitral regurgitation I34.0   • Bicuspid aortic valve Q23.1    • Dyspnea on effort R06.09   • Chest pain, atypical R07.89   • Mitral valve insufficiency, unspecified etiology I34.0       Assessment & Plan   - Severe mitral valve regurgitation s/p MV repair POD#1 Kaykayni  - Marfan syndrome  - bicuspid aortic valve with mild insufficiency  - ITP; hematology consulted  - post op anemia--expected acute blood loss  - leukocytosis--reactive/intraoperative steroids    Looks good this morning, sitting up in the chair  With nausea this morning--will add scopolamine patch  On 0.03 of levophed, replete calcium and wean gtt as able  CI at 0300 was 2.6. Will recheck on now and if stable than discontinue swan  With a lot of ventricular ectopy yesterday immediately after surgery. Placed on amiodarone and lidocaine gtts with improvement. RN reports minimal ectopy overnight. Will decrease amio to 05 and stop lidocaine. He remains AV paced at this time, SR in the 50s underlying  On 3L NC--wean as able  Platelet count stable this morning. Appreciate input from hematology  Mobilize/encourage pulmonary toilet--add mucinex  CXR with vascular congestions. CVP 16--gentle IV diuresis  Stable right PTX. Still with intermittent air leak  Possible transfer to stepdown this afternoon if able to wean gtt  Continue routine care      GUILLERMINA Yuen  02/18/23  06:59 EST    Electronically signed by Lindsey Jung APRN at 02/18/23 3007

## 2023-02-19 NOTE — PROGRESS NOTES
Subjective  1/ mild thrombocytopenia after mitral valve replacement, pt has had sepsis before and had DIC AND PLAT COUNT OF 20 THOUSAND 3 YEARS AGO, CHRONIC LOW LEVEL THROMBOCYTOPENIA.     2. AS A CHILD MULTIPLE HOSPITAL ADMISSIONS FOR REPETITIVE INFECTIONS: QUESTION COMBINED IMMUNODEFICIENCY.     3. OSTEOPOROSIS AS A CHILD AND FRACTURED BONES: WHAT KIND OF GENETIC BONE DISORDER IN A PATIENT WITH MARFAN LIKE SYNDROME?.     4. QUESTION RHEUMATIC FEVER . HEART MURMUR PRESENT ONLY  AT AGE 11 AFTER MULTIPLE INFECTIONS.        History of Present Illness  On 02/17/2023 this 37-year-old male has been admitted to the hospital the day before to proceed with mitral valve replacement therapy by Dr. Gann that has been accomplished this morning. We have been consulted because the patient has mild thrombocytopenia. It seems according to the patient's mother with whom I have had an extensive discussion today that he has had thrombocytopenia in the past on multiple occasions, the most severe one 3 years ago when he developed septicemia as a complication of a tooth abscess and ended up unconscious at home and taken to the hospital with septic syndrome requiring multiple blood products administration, mechanical intubation with resuscitation and aggressive antibiotic therapy with resolution.      The patient has had in laboratory testing done before and available and posted below mild degree of variable thrombocytopenia.      Talking to the patient's mother as well the patient had multiple infections as an infant until he was 5 years old with multiple admissions to the hospital with respiratory infections of all sorts. He was in and out of the hospital all the time. Besides this a murmur was detected in his heart at age 11 and when I mentioned rheumatic fever the patient's mother opened her eyes and said that she probably was that he had this condition then. Obviously raises the question if the murmur is related to a genetic  defect or things of this nature or was acquired condition. In any event, he has undergone the replacement of this.      The patient's mother also mentions the fact that the patient was diagnosed with osteoporosis as a child having multiple bone fractures with no major trauma to trigger the phenomenon. This is not enough to call this in my knowledge, osteogenesis imperfecta. 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   On 02/18/2023 the patient was further reviewed. Since yesterday he has been extubated and now he is sitting in a recliner chair. He has not had any external bleeding. I discussed the case with Matthew Gann MD, who mentioned that before the patient's surgery was completed he bled, bled and bled through surgical sites in multiple areas. He received multiple blood products finally stopping bleeding. Raises the question about platelet dysfunction. His PT and PTT before the surgery were normal.     Today besides this the patient has been able to talk, he has expected pain, he has no nausea or vomiting, his vital signs are stable. His phosphorus has been rechecked after phosphorus infusion yesterday, now is normal but this will require to be checked on daily basis. He eventually required the initiation of vitamin D that also was low.  On 02/19/2023 the patient has been transferred to telemetry floor. He feels better. He has been able to have a little bit of lunch today, no nausea or vomiting, no abdominal pain, no external bleeding. His site of IV access in the neck is clear. His surgical site in the chest remains completely dry. He has not had any nausea or vomiting. No swelling in lower extremities.        Past Medical History, Past Surgical History, Social History, Family History have been reviewed and are without significant changes except as mentioned.    Review of Systems   Constitutional: Positive  for fatigue.   HENT: Negative.    Respiratory: Positive for cough and shortness of breath.    Cardiovascular: Negative.    Gastrointestinal: Negative.    Genitourinary: Negative.    Musculoskeletal: Negative.    Skin: Negative.    Neurological: Negative.    Hematological: Negative.    Psychiatric/Behavioral: Negative.         Medications:  The current medication list was reviewed in the EMR    ALLERGIES:  No Known Allergies    Objective      Vitals:    02/18/23 2353 02/19/23 0405 02/19/23 0700 02/19/23 0755   BP: 124/72 115/68  108/65   BP Location: Left arm Left arm  Left arm   Patient Position: Sitting Sitting  Sitting   Pulse: 85 89  86   Resp: 18 17 18   Temp:    98.9 °F (37.2 °C)   TempSrc:    Oral   SpO2: 95% 91%  90%   Weight:   85.5 kg (188 lb 9.6 oz)    Height:         Current Status 1/18/2022   ECOG score 0       Physical Exam  Constitutional:       Appearance: He is ill-appearing.   HENT:      Head: Normocephalic.   Eyes:      General: No scleral icterus.  Cardiovascular:      Rate and Rhythm: Normal rate and regular rhythm.      Pulses: Normal pulses.      Heart sounds: Normal heart sounds.   Pulmonary:      Effort: No respiratory distress.      Breath sounds: No wheezing or rales.   Abdominal:      General: Abdomen is flat. There is no distension.      Tenderness: There is no abdominal tenderness. There is no guarding.   Musculoskeletal:      Right lower leg: No edema.      Left lower leg: No edema.   Lymphadenopathy:      Cervical: No cervical adenopathy.   Skin:     General: Skin is warm and dry.      Coloration: Skin is not jaundiced.   Neurological:      Mental Status: He is alert. Mental status is at baseline.           RECENT LABS:  Hematology WBC   Date Value Ref Range Status   02/19/2023 14.11 (H) 3.40 - 10.80 10*3/mm3 Final     RBC   Date Value Ref Range Status   02/19/2023 3.51 (L) 4.14 - 5.80 10*6/mm3 Final     Hemoglobin   Date Value Ref Range Status   02/19/2023 10.3 (L) 13.0 - 17.7 g/dL  Final     Hematocrit   Date Value Ref Range Status   02/19/2023 30.6 (L) 37.5 - 51.0 % Final     Platelets   Date Value Ref Range Status   02/19/2023 91 (L) 140 - 450 10*3/mm3 Final          Assessment & Plan    On 02/17/2023 the patient has very mild degree of thrombocytopenia, 125,000 and 89,000 but reviewing the platelet count during the last 3 years there is always a fluctuation in the number most of the time. It raises the question if this patient has some chronic phenomenon more than acute phenomenon just exacerbated by his recent heart operation. The surgical site is completely dry. The catheter and all the lines are completely dry. He has no clinical bleeding and this will be further investigated with an immature platelet fraction. I doubt that LDH is going to be of benefit at this point after such a major intervention. It is going to be some degree of hemolysis anyway and LDH is not going to provide any information. I think it is worth it to do a B12 and folic acid levels.      Eventually in the future when the patient is better we would like to do at least a CT scan of the abdomen to evaluate spleen and liver and see if there is any evidence of chronic splenomegaly. The clinical examination makes me to think that maybe indeed the spleen is mildly enlarged but I am not compromising with that physical finding.      Therefore, the platelet count will be watched in absence of any other intervention. Obviously with this kind of number and no bleeding there is no need for blood products.   On further questioning and discussion with Matthew Gann MD, on 02/18/2023 he documented that after completion of the surgery the patient continued bleeding, bleeding and oozing from multiple sites. He received multiple blood products, finally the bleeding stopped. That raises the question if the patient has some sort of narrative factor that provides him with coagulation issues. I think these numbers eventually will need  to be rechecked in a baseline situation in the future. Today he has no external bleeding at the surgical site and none of the lines are bleeding. He has no external bleeding anywhere. I think it is worth it to check platelet aggregation studies.  On 02/19/2023 the patient has not had any external bleeding of any nature, his hemoglobin is relatively stable, platelet count with minimal drop, white count is stable. His platelet aggregation studies were normal.    I think it will be worth it to repeat some coagulation labs baseline tomorrow including PT, PTT, fibrinogen and thrombin time. Also will do a IPF and will produce also given the minor drop in the platelet count an immature platelet fraction.        2.  The patient according to the mother had multiple admissions to the hospital as an infant to the point that he used to live in the hospital most of the time in and out and when I mentioned issues pertinent to rheumatic fever she opened her eyes and said likely he had this condition. In any event, this patient is now 37 years old and obviously raises the question given the morphological alterations if this patient has some sort of immunodeficiency and severe combined immunodeficiency. I think it is even worth it to check a level if immunoglobulin G, A and M for baseline.      The patient's white count, white count differential were normal.    On 02/18/2023 it is obvious that the patient has had multiple infections as a child. 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 I think he will benefit from immunoglobulin dose especially now that he is postsurgical time.  On 02/19/2023 I think it is worth it again to repeat his phosphorus level on 02/20/2023. He already has been replaced with sodium phosphate IV.        3.  The other issue that this patient has had is multiple fractures when he was a  young age with no major trauma to trigger this. The patient's mother states that he was called osteoporotic then and raises the question about the nature of this and if he has some form of congenital metabolic bone disorder of some sort not bad enough to call this osteogenesis imperfecta but enough to trigger this phenomenon. I think it is worth it to at least to do a vitamin D, magnesium, phosphorus level and a PTH.   On 02/18/2023 it is very obvious that the patient had hypophosphatemia, also low level of vitamin D. His phosphate was replaced yesterday. The phosphate level today is normal. He will require eventually initiation of vitamin D supplementation. This will require to be reassessed as well in the future. His PTH was minimally elevated and I think this could obey also to the fact that he has some element of hypocalcemia.          4.  On 02/17/2023 this patient was further reviewed in regard to so many other issues. It caught my attention his very long fingers in his hand. He had hyperelasticity according to the mother when he was a young child. 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 Raises the question if he has Marfanoid issues and eventually he needs to be seen by genetics in regard to analysis of this situation. This could be eventually scheduled.      His mother was actually very appreciative of my conversation with her and the interaction in regard to these other issues that are not precisely pertinent to his thrombocytopenia but had issues that needed to be addressed in some way or the other. 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  On 02/18/2023 the patient eventually will require real global assessment with endocrinological workup and genetics referral.   On 02/19/2023 I eventually will do a referral for general genetics  when I see him back in the office in a few weeks.         5.  Further reviewing this patient he has a very low vitamin D level that will require to have correction orally once that he is able to receive food and medication by mouth. The patient has profound hypophosphatemia. This could have a lot to do with issues in regard to metabolism and furthermore this degree of hypophosphatemia can lead into hemolytic anemia because of lack of energy in the red cell membrane to keep integrity of the red corpuscle. He will require aggressive phosphate replacement therapy as soon as now. Furthermore the patient has very significant folate deficiency and his level of vitamin B12 is very marginal. I will initiate replacement therapy for this.      All these issues are very striking. His PTH level is also elevated and this probably announced the need for this patient eventually to be seen also at Endocrinology. They do not do inpatient hospital visits anymore.   On 02/18/2023 the patient received yesterday during the surgical intervention Solu-Medrol IV. Today his white count is 30,000, I think this is a reflection of steroid stimulus into the release of marginal population of neutrophils into the peripheral blood. Therefore this per se is not an indicator of infection or anything else, neither leukemia. The hemoglobin remains stable, the platelet count is 141,000 today.    On 02/18/2023 I discussed the case with Matthew Gann MD.    On 02/19/2023 the patient will be now that he is receiving food by mouth to initiate vitamin D supplement at a dose of 1000 units a day.         DURING THE VISIT WITH THE PATIENT TODAY , PATIENT HAD FACE MASK, I HAD PROPER PROTECTIVE EQUIPMENT, AND I DID HAND HYGIENE WITH SOAP AND WATER BEFORE AND AFTER THE VISIT.                   2/19/2023      CC:

## 2023-02-20 ENCOUNTER — APPOINTMENT (OUTPATIENT)
Dept: GENERAL RADIOLOGY | Facility: HOSPITAL | Age: 38
DRG: 220 | End: 2023-02-20
Payer: MEDICAID

## 2023-02-20 ENCOUNTER — APPOINTMENT (OUTPATIENT)
Dept: CT IMAGING | Facility: HOSPITAL | Age: 38
DRG: 220 | End: 2023-02-20
Payer: MEDICAID

## 2023-02-20 LAB
ANION GAP SERPL CALCULATED.3IONS-SCNC: 5.2 MMOL/L (ref 5–15)
APTT PPP: 34.7 SECONDS (ref 22.7–35.4)
BUN SERPL-MCNC: 11 MG/DL (ref 6–20)
BUN/CREAT SERPL: 12 (ref 7–25)
CALCIUM SPEC-SCNC: 8.9 MG/DL (ref 8.6–10.5)
CHLORIDE SERPL-SCNC: 103 MMOL/L (ref 98–107)
CO2 SERPL-SCNC: 30.8 MMOL/L (ref 22–29)
CREAT SERPL-MCNC: 0.92 MG/DL (ref 0.76–1.27)
DEPRECATED RDW RBC AUTO: 41 FL (ref 37–54)
EGFRCR SERPLBLD CKD-EPI 2021: 108.5 ML/MIN/1.73
ERYTHROCYTE [DISTWIDTH] IN BLOOD BY AUTOMATED COUNT: 12.8 % (ref 12.3–15.4)
FIBRINOGEN PPP-MCNC: 481 MG/DL (ref 219–464)
FSP PPP LA-ACNC: ABNORMAL
GLUCOSE BLDC GLUCOMTR-MCNC: 136 MG/DL (ref 70–130)
GLUCOSE BLDC GLUCOMTR-MCNC: 93 MG/DL (ref 70–130)
GLUCOSE SERPL-MCNC: 101 MG/DL (ref 65–99)
HCT VFR BLD AUTO: 31.9 % (ref 37.5–51)
HGB BLD-MCNC: 10.6 G/DL (ref 13–17.7)
MCH RBC QN AUTO: 29.3 PG (ref 26.6–33)
MCHC RBC AUTO-ENTMCNC: 33.2 G/DL (ref 31.5–35.7)
MCV RBC AUTO: 88.1 FL (ref 79–97)
PHOSPHATE SERPL-MCNC: 1.9 MG/DL (ref 2.5–4.5)
PLATELET # BLD AUTO: 100 10*3/MM3 (ref 140–450)
PLATELET # BLD AUTO: 100 10*3/MM3 (ref 140–450)
PLATELETS.RETICULATED NFR BLD AUTO: 14.9 % (ref 0.9–6.5)
PMV BLD AUTO: 12.8 FL (ref 6–12)
POTASSIUM SERPL-SCNC: 5 MMOL/L (ref 3.5–5.2)
QT INTERVAL: 342 MS
RBC # BLD AUTO: 3.62 10*6/MM3 (ref 4.14–5.8)
SODIUM SERPL-SCNC: 139 MMOL/L (ref 136–145)
THROMBIN TIME: 14.9 SECONDS (ref 15.7–20.4)
WBC NRBC COR # BLD: 10.52 10*3/MM3 (ref 3.4–10.8)

## 2023-02-20 PROCEDURE — 74150 CT ABDOMEN W/O CONTRAST: CPT

## 2023-02-20 PROCEDURE — 84100 ASSAY OF PHOSPHORUS: CPT | Performed by: INTERNAL MEDICINE

## 2023-02-20 PROCEDURE — 85055 RETICULATED PLATELET ASSAY: CPT | Performed by: INTERNAL MEDICINE

## 2023-02-20 PROCEDURE — 99232 SBSQ HOSP IP/OBS MODERATE 35: CPT | Performed by: NURSE PRACTITIONER

## 2023-02-20 PROCEDURE — 99231 SBSQ HOSP IP/OBS SF/LOW 25: CPT | Performed by: INTERNAL MEDICINE

## 2023-02-20 PROCEDURE — 85362 FIBRIN DEGRADATION PRODUCTS: CPT | Performed by: INTERNAL MEDICINE

## 2023-02-20 PROCEDURE — 80048 BASIC METABOLIC PNL TOTAL CA: CPT | Performed by: NURSE PRACTITIONER

## 2023-02-20 PROCEDURE — 85730 THROMBOPLASTIN TIME PARTIAL: CPT | Performed by: INTERNAL MEDICINE

## 2023-02-20 PROCEDURE — 99024 POSTOP FOLLOW-UP VISIT: CPT | Performed by: THORACIC SURGERY (CARDIOTHORACIC VASCULAR SURGERY)

## 2023-02-20 PROCEDURE — 82962 GLUCOSE BLOOD TEST: CPT

## 2023-02-20 PROCEDURE — 85670 THROMBIN TIME PLASMA: CPT | Performed by: INTERNAL MEDICINE

## 2023-02-20 PROCEDURE — 93005 ELECTROCARDIOGRAM TRACING: CPT | Performed by: NURSE PRACTITIONER

## 2023-02-20 PROCEDURE — 85027 COMPLETE CBC AUTOMATED: CPT | Performed by: NURSE PRACTITIONER

## 2023-02-20 PROCEDURE — 97530 THERAPEUTIC ACTIVITIES: CPT

## 2023-02-20 PROCEDURE — 85384 FIBRINOGEN ACTIVITY: CPT | Performed by: INTERNAL MEDICINE

## 2023-02-20 PROCEDURE — 71046 X-RAY EXAM CHEST 2 VIEWS: CPT

## 2023-02-20 RX ORDER — FUROSEMIDE 40 MG/1
40 TABLET ORAL
Status: DISCONTINUED | OUTPATIENT
Start: 2023-02-20 | End: 2023-02-20

## 2023-02-20 RX ORDER — POTASSIUM CHLORIDE 750 MG/1
20 TABLET, FILM COATED, EXTENDED RELEASE ORAL DAILY
Status: DISCONTINUED | OUTPATIENT
Start: 2023-02-21 | End: 2023-02-20

## 2023-02-20 RX ORDER — FUROSEMIDE 40 MG/1
40 TABLET ORAL DAILY
Status: DISCONTINUED | OUTPATIENT
Start: 2023-02-20 | End: 2023-02-21

## 2023-02-20 RX ORDER — POTASSIUM CHLORIDE 750 MG/1
20 TABLET, FILM COATED, EXTENDED RELEASE ORAL 2 TIMES DAILY WITH MEALS
Status: DISCONTINUED | OUTPATIENT
Start: 2023-02-20 | End: 2023-02-20

## 2023-02-20 RX ORDER — FUROSEMIDE 40 MG/1
40 TABLET ORAL DAILY
Status: DISCONTINUED | OUTPATIENT
Start: 2023-02-21 | End: 2023-02-20

## 2023-02-20 RX ORDER — FUROSEMIDE 40 MG/1
40 TABLET ORAL DAILY
Status: DISCONTINUED | OUTPATIENT
Start: 2023-02-20 | End: 2023-02-20

## 2023-02-20 RX ORDER — POTASSIUM CHLORIDE 750 MG/1
20 TABLET, FILM COATED, EXTENDED RELEASE ORAL DAILY
Status: DISCONTINUED | OUTPATIENT
Start: 2023-02-20 | End: 2023-02-20

## 2023-02-20 RX ORDER — POTASSIUM CHLORIDE 750 MG/1
20 TABLET, FILM COATED, EXTENDED RELEASE ORAL DAILY
Status: DISCONTINUED | OUTPATIENT
Start: 2023-02-20 | End: 2023-02-21

## 2023-02-20 RX ADMIN — PANTOPRAZOLE SODIUM 40 MG: 40 TABLET, DELAYED RELEASE ORAL at 05:58

## 2023-02-20 RX ADMIN — HYDROCODONE BITARTRATE AND ACETAMINOPHEN 2 TABLET: 5; 325 TABLET ORAL at 05:57

## 2023-02-20 RX ADMIN — POTASSIUM CHLORIDE 20 MEQ: 750 TABLET, EXTENDED RELEASE ORAL at 16:36

## 2023-02-20 RX ADMIN — DOCUSATE SODIUM 50MG AND SENNOSIDES 8.6MG 2 TABLET: 8.6; 5 TABLET, FILM COATED ORAL at 20:21

## 2023-02-20 RX ADMIN — ASPIRIN 81 MG: 81 TABLET, COATED ORAL at 09:41

## 2023-02-20 RX ADMIN — METOPROLOL TARTRATE 25 MG: 25 TABLET, FILM COATED ORAL at 09:41

## 2023-02-20 RX ADMIN — Medication 1000 UNITS: at 09:41

## 2023-02-20 RX ADMIN — METOPROLOL TARTRATE 25 MG: 25 TABLET, FILM COATED ORAL at 20:21

## 2023-02-20 RX ADMIN — GUAIFENESIN 1200 MG: 600 TABLET, EXTENDED RELEASE ORAL at 20:21

## 2023-02-20 RX ADMIN — FOLIC ACID 1 MG: 5 INJECTION, SOLUTION INTRAMUSCULAR; INTRAVENOUS; SUBCUTANEOUS at 12:13

## 2023-02-20 RX ADMIN — CYCLOBENZAPRINE 10 MG: 10 TABLET, FILM COATED ORAL at 20:21

## 2023-02-20 RX ADMIN — FUROSEMIDE 40 MG: 40 TABLET ORAL at 16:35

## 2023-02-20 RX ADMIN — MUPIROCIN 1 APPLICATION: 20 OINTMENT TOPICAL at 20:21

## 2023-02-20 RX ADMIN — SODIUM PHOSPHATE, MONOBASIC, MONOHYDRATE AND SODIUM PHOSPHATE, DIBASIC, ANHYDROUS 15 MMOL: 276; 142 INJECTION, SOLUTION INTRAVENOUS at 05:58

## 2023-02-20 RX ADMIN — POLYETHYLENE GLYCOL 3350 17 G: 17 POWDER, FOR SOLUTION ORAL at 09:40

## 2023-02-20 RX ADMIN — HYDROCODONE BITARTRATE AND ACETAMINOPHEN 2 TABLET: 5; 325 TABLET ORAL at 00:04

## 2023-02-20 RX ADMIN — GUAIFENESIN 1200 MG: 600 TABLET, EXTENDED RELEASE ORAL at 09:41

## 2023-02-20 RX ADMIN — HYDROCODONE BITARTRATE AND ACETAMINOPHEN 1 TABLET: 5; 325 TABLET ORAL at 20:21

## 2023-02-20 RX ADMIN — MUPIROCIN 1 APPLICATION: 20 OINTMENT TOPICAL at 09:41

## 2023-02-20 NOTE — PLAN OF CARE
Goal Outcome Evaluation:  Plan of Care Reviewed With: patient        Progress: no change  Outcome Evaluation: POD 3. VSS. NSR on tele. 2L NC. Patient is taking shallow breaths and not wanting to cough. IS encouraged. Completed 6 reps and pulled between 500-100. Education on proper technique, importance of pulmonary toileting and needing to cough. Patient verbalized understanding. Patient was unwilling to complete any walks overnight. Pain was treated per MAR. Critcal Phos, replacing with Sodium phos per orders. Safety and sternal precautions maintained. Up in chair for breakfast. Call light within reach

## 2023-02-20 NOTE — DISCHARGE PLACEMENT REQUEST
"Regino So (37 y.o. Male)     Date of Birth   1985    Social Security Number       Address   [address] [place] [address]    Home Phone   [phone]    MRN   [id number]       [facility]    Marital Status   Single                            Admission Date   2/17/23    Admission Type   Elective    Admitting Provider   Matthew Gann MD    Attending Provider   Matthew Gann MD    Department, Room/Bed   [facility] CARDIOVASC UNIT, 2224/1       Discharge Date       Discharge Disposition       Discharge Destination                               Attending Provider: Matthew Gann MD    Allergies: No Known Allergies    Isolation: None   Infection: None   Code Status: CPR    Ht: 198.1 cm (78\")   Wt: 82.5 kg (181 lb 14.4 oz)    Admission Cmt: None   Principal Problem: Severe mitral regurgitation [I34.0]                 Active Insurance as of 2/17/2023     Primary Coverage     Payor Plan Insurance Group Employer/Plan Group    ANTH MEDICAID ANTH MEDICAID [id number]     Payor Plan Address Payor Plan Phone Number Payor Plan Fax Number Effective Dates    [address] [address] [phone]  7/2/2019 - None Entered    [facility] [id number]       Subscriber Name Subscriber Birth Date Member ID       REGINO SO 1985 [id number]                 Emergency Contacts      (Rel.) Home Phone Work Phone Mobile Phone    SARAY,ARPIT (Father) [phone] -- [phone]    SaraySara (Mother) [phone] -- [phone]              "

## 2023-02-20 NOTE — PROGRESS NOTES
Subjective      1/ mild thrombocytopenia after mitral valve replacement, pt has had sepsis before and had DIC AND PLAT COUNT OF 20 THOUSAND 3 YEARS AGO, CHRONIC LOW LEVEL THROMBOCYTOPENIA.     2. AS A CHILD MULTIPLE HOSPITAL ADMISSIONS FOR REPETITIVE INFECTIONS: QUESTION COMBINED IMMUNODEFICIENCY.     3. OSTEOPOROSIS AS A CHILD AND FRACTURED BONES: WHAT KIND OF GENETIC BONE DISORDER IN A PATIENT WITH MARFAN LIKE SYNDROME?.     4. QUESTION RHEUMATIC FEVER . HEART MURMUR PRESENT ONLY  AT AGE 11 AFTER MULTIPLE INFECTIONS.    Interval history:    February 20, 2023:  Hemoglobin today 10.6 with white count of 10.52 and platelet of 100.,  Immature platelet fraction is 14.9 elevated, phosphorus of 1.9 PTT 34.7 fibrinogen 481 fibrin split products greater than 5 but less than 20 thrombin time 14.9.    Dr. Whitley had discussed about considering CT abdomen pelvis when more stable to evaluate for any splenomegaly.  And follow-up with observation.    Patient had received multiple blood products and finally his bleeding had stopped.  Patient had no surgical site bleeding.  Dr. Vicente was considering platelet aggregation studies which were done and platelet aggregation studies were normal.      History of Present Illness  On 02/17/2023 this 37-year-old male has been admitted to the hospital the day before to proceed with mitral valve replacement therapy by Dr. Gann that has been accomplished this morning. We have been consulted because the patient has mild thrombocytopenia. It seems according to the patient's mother with whom I have had an extensive discussion today that he has had thrombocytopenia in the past on multiple occasions, the most severe one 3 years ago when he developed septicemia as a complication of a tooth abscess and ended up unconscious at home and taken to the hospital with septic syndrome requiring multiple blood products administration, mechanical intubation with resuscitation and aggressive antibiotic  therapy with resolution.      The patient has had in laboratory testing done before and available and posted below mild degree of variable thrombocytopenia.      Talking to the patient's mother as well the patient had multiple infections as an infant until he was 5 years old with multiple admissions to the hospital with respiratory infections of all sorts. He was in and out of the hospital all the time. Besides this a murmur was detected in his heart at age 11 and when I mentioned rheumatic fever the patient's mother opened her eyes and said that she probably was that he had this condition then. Obviously raises the question if the murmur is related to a genetic defect or things of this nature or was acquired condition. In any event, he has undergone the replacement of this.      The patient's mother also mentions the fact that the patient was diagnosed with osteoporosis as a child having multiple bone fractures with no major trauma to trigger the phenomenon. This is not enough to call this in my knowledge, osteogenesis imperfecta. 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   On 02/18/2023 the patient was further reviewed. Since yesterday he has been extubated and now he is sitting in a recliner chair. He has not had any external bleeding. I discussed the case with Matthew Gann MD, who mentioned that before the patient's surgery was completed he bled, bled and bled through surgical sites in multiple areas. He received multiple blood products finally stopping bleeding. Raises the question about platelet dysfunction. His PT and PTT before the surgery were normal.     Today besides this the patient has been able to talk, he has expected pain, he has no nausea or vomiting, his vital signs are stable. His phosphorus has been rechecked after phosphorus infusion yesterday, now is normal but this will require to be  checked on daily basis. He eventually required the initiation of vitamin D that also was low.    ·   Past Medical History, Past Surgical History, Social History, Family History have been reviewed and are without significant changes except as mentioned.    Review of Systems   Constitutional: Positive for activity change and fatigue.   HENT: Negative.    Respiratory: Positive for cough and shortness of breath.    Cardiovascular: Negative.    Gastrointestinal: Negative.    Genitourinary: Negative.    Musculoskeletal: Negative.    Skin: Negative.    Neurological: Negative.    Hematological: Negative.    Psychiatric/Behavioral: Negative.         Medications:  The current medication list was reviewed in the EMR    ALLERGIES:  No Known Allergies    Objective      Vitals:    02/19/23 2348 02/20/23 0400 02/20/23 0645 02/20/23 0822   BP: 100/58 114/70  114/71   BP Location: Left arm Left arm  Left arm   Patient Position: Lying  Comment: side lying Lying  Sitting   Pulse: 82 81  86   Resp: 18 17  17   Temp: 99.9 °F (37.7 °C) 99.2 °F (37.3 °C)  98.5 °F (36.9 °C)   TempSrc: Oral Oral  Oral   SpO2: 93% 94%  95%   Weight:   82.5 kg (181 lb 14.4 oz)    Height:         Current Status 1/18/2022   ECOG score 0       Physical Exam  Constitutional:       Appearance: He is ill-appearing.   Eyes:      General: No scleral icterus.  Cardiovascular:      Rate and Rhythm: Normal rate and regular rhythm.      Pulses: Normal pulses.      Heart sounds: Normal heart sounds. No murmur heard.    No gallop.   Pulmonary:      Effort: Pulmonary effort is normal. No respiratory distress.      Breath sounds: Normal breath sounds. No wheezing or rales.   Abdominal:      General: Abdomen is flat. There is no distension.      Tenderness: There is no abdominal tenderness. There is no guarding or rebound.   Musculoskeletal:      Right lower leg: No edema.      Left lower leg: No edema.   Lymphadenopathy:      Cervical: No cervical adenopathy.   Skin:      General: Skin is warm and dry.   Neurological:      Mental Status: He is alert. Mental status is at baseline.           RECENT LABS:  Hematology WBC   Date Value Ref Range Status   02/20/2023 10.52 3.40 - 10.80 10*3/mm3 Final     RBC   Date Value Ref Range Status   02/20/2023 3.62 (L) 4.14 - 5.80 10*6/mm3 Final     Hemoglobin   Date Value Ref Range Status   02/20/2023 10.6 (L) 13.0 - 17.7 g/dL Final     Hematocrit   Date Value Ref Range Status   02/20/2023 31.9 (L) 37.5 - 51.0 % Final     Platelets   Date Value Ref Range Status   02/20/2023 100 (L) 140 - 450 10*3/mm3 Final   02/20/2023 100 (L) 140 - 450 10*3/mm3 Final          Assessment & Plan    On 02/17/2023 the patient has very mild degree of thrombocytopenia, 125,000 and 89,000 but reviewing the platelet count during the last 3 years there is always a fluctuation in the number most of the time. It raises the question if this patient has some chronic phenomenon more than acute phenomenon just exacerbated by his recent heart operation. The surgical site is completely dry. The catheter and all the lines are completely dry. He has no clinical bleeding and this will be further investigated with an immature platelet fraction. I doubt that LDH is going to be of benefit at this point after such a major intervention. It is going to be some degree of hemolysis anyway and LDH is not going to provide any information. I think it is worth it to do a B12 and folic acid levels.      Eventually in the future when the patient is better we would like to do at least a CT scan of the abdomen to evaluate spleen and liver and see if there is any evidence of chronic splenomegaly. The clinical examination makes me to think that maybe indeed the spleen is mildly enlarged but I am not compromising with that physical finding.      Therefore, the platelet count will be watched in absence of any other intervention. Obviously with this kind of number and no bleeding there is no need for  blood products.   On further questioning and discussion with Matthew Gann MD, on 02/18/2023 he documented that after completion of the surgery the patient continued bleeding, bleeding and oozing from multiple sites. He received multiple blood products, finally the bleeding stopped. That raises the question if the patient has some sort of narrative factor that provides him with coagulation issues. I think these numbers eventually will need to be rechecked in a baseline situation in the future. Today he has no external bleeding at the surgical site and none of the lines are bleeding. He has no external bleeding anywhere. I think it is worth it to check platelet aggregation studies.  Platelet aggregation studies normal  · February 20, 2023 platelets 100 with IPF of 14.6  2.  The patient according to the mother had multiple admissions to the hospital as an infant to the point that he used to live in the hospital most of the time in and out and when I mentioned issues pertinent to rheumatic fever she opened her eyes and said likely he had this condition. In any event, this patient is now 37 years old and obviously raises the question given the morphological alterations if this patient has some sort of immunodeficiency and severe combined immunodeficiency. I think it is even worth it to check a level if immunoglobulin G, A and M for baseline.      The patient's white count, white count differential were normal.    On 02/18/2023 it is obvious that the patient has had multiple infections as a child. 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 I think he will benefit from immunoglobulin dose especially now that he is postsurgical time.    ·   3.  The other issue that this patient has had is multiple fractures when he was a young age with no major trauma to trigger this. The patient's mother states that  he was called osteoporotic then and raises the question about the nature of this and if he has some form of congenital metabolic bone disorder of some sort not bad enough to call this osteogenesis imperfecta but enough to trigger this phenomenon. I think it is worth it to at least to do a vitamin D, magnesium, phosphorus level and a PTH.   On 02/18/2023 it is very obvious that the patient had hypophosphatemia, also low level of vitamin D. His phosphate was replaced yesterday. The phosphate level today is normal. He will require eventually initiation of vitamin D supplementation. This will require to be reassessed as well in the future. His PTH was minimally elevated and I think this could obey also to the fact that he has some element of hypocalcemia.     ·      4.  On 02/17/2023 this patient was further reviewed in regard to so many other issues. It caught my attention his very long fingers in his hand. He had hyperelasticity according to the mother when he was a young child. 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 Raises the question if he has Marfanoid issues and eventually he needs to be seen by genetics in regard to analysis of this situation. This could be eventually scheduled.      His mother was actually very appreciative of my conversation with her and the interaction in regard to these other issues that are not precisely pertinent to his thrombocytopenia but had issues that needed to be addressed in some way or the other. 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  On 02/18/2023 the patient eventually will require real global assessment with endocrinological workup and genetics referral.     ·   5.  Further reviewing this patient he has a very low vitamin D level that will require to have correction orally once that he is able to  receive food and medication by mouth. The patient has profound hypophosphatemia. This could have a lot to do with issues in regard to metabolism and furthermore this degree of hypophosphatemia can lead into hemolytic anemia because of lack of energy in the red cell membrane to keep integrity of the red corpuscle. He will require aggressive phosphate replacement therapy as soon as now. Furthermore the patient has very significant folate deficiency and his level of vitamin B12 is very marginal. I will initiate replacement therapy for this.      All these issues are very striking. His PTH level is also elevated and this probably announced the need for this patient eventually to be seen also at Endocrinology. They do not do inpatient hospital visits anymore.   On 02/18/2023 the patient received yesterday during the surgical intervention Solu-Medrol IV. Today his white count is 30,000, I think this is a reflection of steroid stimulus into the release of marginal population of neutrophils into the peripheral blood. Therefore this per se is not an indicator of infection or anything else, neither leukemia. The hemoglobin remains stable, the platelet count is 141,000 today.    On 02/18/2023 I discussed the case with Matthew Gann MD.    *Severe symptomatic mitral valve regurgitation s/p mitral valve repair February 17, 2023    *Marfan syndrome    *Bicuspid aortic valve with mild aortic valve insufficiency    *Postop pericarditis as evidenced by EKG.  Continue supportive care    Plan  · Platelet aggregation studies apparently normal per Dr. Whitley, will check results  · Platelets 100 today with IPF score of 14.6  · Bleeding had stopped at the surgical site  · Repeat PTT normal at 34.7.  Fibrinogen was normal at 481 with fibrin split products greater than 5 but less than 20  · We will follow    Shelbie Epps MD         DURING THE VISIT WITH THE PATIENT TODAY , PATIENT HAD FACE MASK, I HAD PROPER PROTECTIVE EQUIPMENT, AND  I DID HAND HYGIENE WITH SOAP AND WATER BEFORE AND AFTER THE VISIT.                   2/20/2023      CC:

## 2023-02-20 NOTE — THERAPY TREATMENT NOTE
Patient Name: Regino Fox  : 1985    MRN: [id number]                              Today's Date: 2023       Admit Date: 2023    Visit Dx:     ICD-10-CM ICD-9-CM   1. S/P MVR (mitral valve repair)  Z98.890 V45.89   2. Mitral valve insufficiency, unspecified etiology  I34.0 424.0     Patient Active Problem List   Diagnosis   • VHD (valvular heart disease)   • Marfan syndrome   • Severe mitral regurgitation   • Bicuspid aortic valve   • Dyspnea on effort   • Chest pain, atypical   • Mitral valve insufficiency, unspecified etiology     Past Medical History:   Diagnosis Date   • Asthma    • Bicuspid aortic valve    • Broken ankle, left, sequela     HX   • CHF (congestive heart failure) ([facility])    • Congenital heart disease    • Dyspnea on exertion    • Heart murmur    • Heart valve disease    • History of migraine    • Hypertension     QUIT TAKING MEDS AGE 19 DUE TO INSURANCE REASONS   • Marfan syndrome    • Mitral valve prolapse    • Scoliosis    • TIA (transient ischemic attack)     HX     Past Surgical History:   Procedure Laterality Date   • FEMUR SURGERY Right     X2 FOR FRACTURE AGE 11   • LYMPH NODE DISSECTION      NECK   • MITRAL VALVE REPAIR/REPLACEMENT N/A 2023    Procedure: JEFFERSON STERNOTOMY MITRAL VALVE REPAIR AND PRP;  Surgeon: Matthew Gann MD;  Location: [facility];  Service: Cardiothoracic;  Laterality: N/A;   • TRANSESOPHAGEAL ECHOCARDIOGRAM (JEFFERSON) N/A 2022    Procedure: TRANSESOPHAGEAL ECHOCARDIOGRAM;  Surgeon: ADRIANNA Bernard MD;  Location: [facility];  Service: Cardiology;  Laterality: N/A;      General Information     Row Name 23 1124          Physical Therapy Time and Intention    Document Type therapy note (daily note)  -MS     Mode of Treatment physical therapy;individual therapy  -MS     Row Name 23 1124          General Information    Patient Profile Reviewed yes  -MS     Existing Precautions/Restrictions cardiac;sternal   Exit alarm  -MS      Barriers to Rehab none identified  -MS     Row Name 02/20/23 1124          Cognition    Orientation Status (Cognition) oriented x 3  -MS           User Key  (r) = Recorded By, (t) = Taken By, (c) = Cosigned By    Initials Name Provider Type    Randy Vieyra, PT Physical Therapist               Mobility     Row Name 02/20/23 1124          Bed Mobility    Comment, (Bed Mobility) Up in chair this AM.  -MS     Row Name 02/20/23 1124          Sit-Stand Transfer    Sit-Stand [place] (Transfers) contact guard  -MS     Row Name 02/20/23 1124          Gait/Stairs (Locomotion)    [place] Level (Gait) contact guard;2 person assist;1 person to manage equipment  -MS     Distance in Feet (Gait) 240 feet  -MS     Deviations/Abnormal Patterns (Gait) kianna decreased  -MS     Comment, (Gait/Stairs) Slightly unsteady but no overt losses of balance.  -MS           User Key  (r) = Recorded By, (t) = Taken By, (c) = Cosigned By    Initials Name Provider Type    Randy Vieyra PT Physical Therapist               Obj/Interventions     Row Name 02/20/23 1125          Motor Skills    Therapeutic Exercise --  Cardiac ther. ex. program x 10 reps completed  -MS           User Key  (r) = Recorded By, (t) = Taken By, (c) = Cosigned By    Initials Name Provider Type    Randy Vieyra, PT Physical Therapist               Goals/Plan    No documentation.                Clinical Impression     Row Name 02/20/23 1125          Pain    Pretreatment Pain Rating 3/10  -MS     Posttreatment Pain Rating 3/10  -MS     Pain Location incisional  -MS     Pain Location - chest  -MS     Row Name 02/20/23 1125          Positioning and Restraints    Pre-Treatment Position sitting in chair/recliner  -MS     Post Treatment Position chair  -MS     In Chair notified nsg;reclined;sitting;call light within reach;exit alarm on;encouraged to call for assist  All lines intact.  -MS           User Key  (r) = Recorded By, (t) = Taken By, (c) =  Cosigned By    Initials Name Provider Type    Randy Vieyra LINA, PT Physical Therapist               Outcome Measures     Row Name 02/20/23 1126 02/20/23 0941       How much help from another person do you currently need...    Turning from your back to your side while in flat bed without using bedrails? 3  -MS 3  -AC    Moving from lying on back to sitting on the side of a flat bed without bedrails? 3  -MS 2  -AC    Moving to and from a bed to a chair (including a wheelchair)? 3  -MS 3  -AC    Standing up from a chair using your arms (e.g., wheelchair, bedside chair)? 3  -MS 3  -AC    Climbing 3-5 steps with a railing? 3  -MS 2  -AC    To walk in hospital room? 3  -MS 3  -AC    AM-PAC 6 Clicks Score (PT) 18  -MS 16  -AC    Highest level of mobility 6 --> Walked 10 steps or more  -MS 5 --> Static standing  -AC    Row Name 02/20/23 1126          Functional Assessment    Outcome Measure Options AM-PAC 6 Clicks Basic Mobility (PT)  -MS           User Key  (r) = Recorded By, (t) = Taken By, (c) = Cosigned By    Initials Name Provider Type    MS Rogers Randy MILLS, PT Physical Therapist    Korin Urias, RN Registered Nurse                             Physical Therapy Education     Title: PT OT SLP Therapies (Done)     Topic: Physical Therapy (Done)     Point: Mobility training (Done)     Learning Progress Summary           Patient Acceptance, E,D, VU,NR by MS at 2/20/2023 1126    Acceptance, E,TB, VU,NR by CB at 2/18/2023 1206                   Point: Home exercise program (Done)     Learning Progress Summary           Patient Acceptance, E,D, VU,NR by MS at 2/20/2023 1126    Acceptance, E,TB, VU,NR by CB at 2/18/2023 1206                   Point: Body mechanics (Done)     Learning Progress Summary           Patient Acceptance, E,D, VU,NR by MS at 2/20/2023 1126    Acceptance, E,TB, VU,NR by CB at 2/18/2023 1206                   Point: Precautions (Done)     Learning Progress Summary           Patient Acceptance,  E,D, VU,NR by MS at 2/20/2023 1126    Acceptance, E,TB, VU,NR by CB at 2/18/2023 1206                               User Key     Initials Effective Dates Name Provider Type Discipline    MS 06/16/21 -  Randy Rogers, PT Physical Therapist PT    CB 10/22/21 -  Angela López PT Physical Therapist PT              PT Recommendation and Plan     Plan of Care Reviewed With: patient  Outcome Evaluation: Upon entering room, pt. sitting in chair, awake/alert, and agreeable to work with P.T. this date. Pt. able to ambulate 240 feet, CGA x 2, with no use of A.D. this date.  Pt. requires CGA x 1 for sit <-> stand transfers.  Cardiac ther. ex. program x 10 reps completed for general strengthening/stretching.  Mild unsteadiness during ambulation but no overt losses of balance.  Will continue to progress functional mobility as tolerated.     Time Calculation:    PT Charges     Row Name 02/20/23 1128             Time Calculation    Start Time 0925  -MS      Stop Time 0940  -MS      Time Calculation (min) 15 min  -MS      PT Received On 02/20/23  -MS      PT - Next Appointment 02/21/23  -MS         Time Calculation- PT    Total Timed Code Minutes- PT 14 minute(s)  -MS            User Key  (r) = Recorded By, (t) = Taken By, (c) = Cosigned By    Initials Name Provider Type    MS Rogers, Randy MILLS, PT Physical Therapist              Therapy Charges for Today     Code Description Service Date Service Provider Modifiers Qty    97130952466 HC PT THERAPEUTIC ACT EA 15 MIN 2/20/2023 Randy Rogers, PT GP 1    22306178683 HC PT THER SUPP EA 15 MIN 2/20/2023 Randy Rogers, PT GP 1          PT G-Codes  Outcome Measure Options: AM-PAC 6 Clicks Basic Mobility (PT)  AM-PAC 6 Clicks Score (PT): 18       Randy Rogers PT  2/20/2023

## 2023-02-20 NOTE — PROGRESS NOTES
" LOS: 3 days   Patient Care Team:  Donal Hutchison MD as PCP - General (Internal Medicine)  ADRIANNA Bernard MD as Consulting Physician (Cardiology)    Chief Complaint: post op    Subjective:  Symptoms:  He reports chest pain.  No shortness of breath.    Diet:  Adequate intake.  No nausea or vomiting.    Activity level: Impaired due to pain.    Pain:  He complains of pain that is mild.  Pain is well controlled.      Vital Signs  Temp:  [97.9 °F (36.6 °C)-100.3 °F (37.9 °C)] 99.2 °F (37.3 °C)  Heart Rate:  [81-94] 81  Resp:  [17-18] 17  BP: (100-121)/(58-76) 114/70  Body mass index is 21.02 kg/m².    Intake/Output Summary (Last 24 hours) at 2/20/2023 0714  Last data filed at 2/20/2023 0558  Gross per 24 hour   Intake 1330 ml   Output 3425 ml   Net -2095 ml     No intake/output data recorded.    Chest tube drainage last 8 hours: 100        02/18/23  0600 02/19/23  0700 02/20/23  0645   Weight: 82.8 kg (182 lb 8.7 oz) 85.5 kg (188 lb 9.6 oz) 82.5 kg (181 lb 14.4 oz)     Objective:  General Appearance:  Comfortable and in no acute distress.    Vital signs: (most recent): Blood pressure 114/70, pulse 81, temperature 99.2 °F (37.3 °C), temperature source Oral, resp. rate 17, height 198.1 cm (78\"), weight 82.5 kg (181 lb 14.4 oz), SpO2 94 %.  Vital signs are normal.  No fever.    Output: Producing urine and no stool output.    Lungs:  Normal effort and normal respiratory rate.  There are rales and decreased breath sounds.    Heart: Normal rate.  Regular rhythm.  Positive for friction rub.  (SR in 80s)  Abdomen: Abdomen is soft.  Hypoactive bowel sounds.     Pulses: Distal pulses are intact.    Neurological: Patient is alert and oriented to person, place and time.    Skin:  Warm and dry.  (Sternal dressing clean, dry, and intact)        Results Review:        WBC WBC   Date Value Ref Range Status   02/20/2023 10.52 3.40 - 10.80 10*3/mm3 Final   02/19/2023 14.11 (H) 3.40 - 10.80 10*3/mm3 Final   02/18/2023 30.58 (C) 3.40 - " 10.80 10*3/mm3 Final   02/17/2023 27.78 (H) 3.40 - 10.80 10*3/mm3 Final   02/17/2023 19.24 (H) 3.40 - 10.80 10*3/mm3 Final   02/17/2023 5.72 3.40 - 10.80 10*3/mm3 Final   02/17/2023 3.45 3.40 - 10.80 10*3/mm3 Final      HGB Hemoglobin   Date Value Ref Range Status   02/20/2023 10.6 (L) 13.0 - 17.7 g/dL Final   02/19/2023 10.3 (L) 13.0 - 17.7 g/dL Final   02/18/2023 11.2 (L) 13.0 - 17.7 g/dL Final   02/17/2023 11.8 (L) 13.0 - 17.7 g/dL Final   02/17/2023 12.8 (L) 13.0 - 17.7 g/dL Final   02/17/2023 12.5 (L) 13.0 - 17.7 g/dL Final   02/17/2023 12.4 (L) 13.0 - 17.7 g/dL Final   02/17/2023 11.6 (L) 12.0 - 17.0 g/dL Final   02/17/2023 12.2 12.0 - 17.0 g/dL Final   02/17/2023 13.3 12.0 - 17.0 g/dL Final   02/17/2023 11.9 (L) 12.0 - 17.0 g/dL Final   02/17/2023 12.9 12.0 - 17.0 g/dL Final      HCT Hematocrit   Date Value Ref Range Status   02/20/2023 31.9 (L) 37.5 - 51.0 % Final   02/19/2023 30.6 (L) 37.5 - 51.0 % Final   02/18/2023 32.5 (L) 37.5 - 51.0 % Final   02/17/2023 33.8 (L) 37.5 - 51.0 % Final   02/17/2023 36.3 (L) 37.5 - 51.0 % Final   02/17/2023 37.1 (L) 37.5 - 51.0 % Final   02/17/2023 36.5 (L) 37.5 - 51.0 % Final   02/17/2023 34 (L) 38 - 51 % Final   02/17/2023 36 (L) 38 - 51 % Final   02/17/2023 39 38 - 51 % Final   02/17/2023 35 (L) 38 - 51 % Final   02/17/2023 38 38 - 51 % Final      Platelets Platelets   Date Value Ref Range Status   02/20/2023 100 (L) 140 - 450 10*3/mm3 Final   02/20/2023 100 (L) 140 - 450 10*3/mm3 Final   02/19/2023 91 (L) 140 - 450 10*3/mm3 Final   02/18/2023 141 140 - 450 10*3/mm3 Final   02/17/2023 140 140 - 450 10*3/mm3 Final   02/17/2023 122 (L) 140 - 450 10*3/mm3 Final   02/17/2023 125 (L) 140 - 450 10*3/mm3 Final   02/17/2023 125 (L) 140 - 450 10*3/mm3 Final   02/17/2023 89 (L) 140 - 450 10*3/mm3 Final        PT/INR:    Protime   Date Value Ref Range Status   02/17/2023 17.9 (H) 11.7 - 14.2 Seconds Final   02/17/2023 17.4 (H) 11.7 - 14.2 Seconds Final   02/17/2023 17.9 (H) 12.8 -  15.2 seconds Final     Comment:     Serial Number: [id number]:  [id number]   02/17/2023 18.8 (H) 11.7 - 14.2 Seconds Final   /  INR   Date Value Ref Range Status   02/17/2023 1.46 (H) 0.90 - 1.10 Final   02/17/2023 1.41 (H) 0.90 - 1.10 Final   02/17/2023 1.5 (H) 0.8 - 1.2 Final   02/17/2023 1.55 (H) 0.90 - 1.10 Final       Sodium Sodium   Date Value Ref Range Status   02/20/2023 139 136 - 145 mmol/L Final   02/19/2023 136 136 - 145 mmol/L Final   02/18/2023 137 136 - 145 mmol/L Final   02/18/2023 137 136 - 145 mmol/L Final   02/17/2023 134 (L) 136 - 145 mmol/L Final   02/17/2023 140 136 - 145 mmol/L Final   02/17/2023 139 136 - 145 mmol/L Final      Potassium Potassium   Date Value Ref Range Status   02/20/2023 5.0 3.5 - 5.2 mmol/L Final   02/19/2023 4.3 3.5 - 5.2 mmol/L Final   02/19/2023 3.6 3.5 - 5.2 mmol/L Final   02/18/2023 4.5 3.5 - 5.2 mmol/L Final   02/18/2023 5.3 (H) 3.5 - 5.2 mmol/L Final   02/17/2023 5.2 3.5 - 5.2 mmol/L Final   02/17/2023 3.8 3.5 - 5.2 mmol/L Final   02/17/2023 4.2 3.5 - 5.2 mmol/L Final     Comment:     Slight hemolysis detected by analyzer. Results may be affected.      Chloride Chloride   Date Value Ref Range Status   02/20/2023 103 98 - 107 mmol/L Final   02/19/2023 102 98 - 107 mmol/L Final   02/18/2023 102 98 - 107 mmol/L Final   02/18/2023 107 98 - 107 mmol/L Final   02/17/2023 106 98 - 107 mmol/L Final   02/17/2023 110 (H) 98 - 107 mmol/L Final   02/17/2023 109 (H) 98 - 107 mmol/L Final      Bicarbonate CO2   Date Value Ref Range Status   02/20/2023 30.8 (H) 22.0 - 29.0 mmol/L Final   02/19/2023 27.9 22.0 - 29.0 mmol/L Final   02/18/2023 24.6 22.0 - 29.0 mmol/L Final   02/18/2023 23.0 22.0 - 29.0 mmol/L Final   02/17/2023 21.8 (L) 22.0 - 29.0 mmol/L Final   02/17/2023 22.0 22.0 - 29.0 mmol/L Final   02/17/2023 22.0 22.0 - 29.0 mmol/L Final      BUN BUN   Date Value Ref Range Status   02/20/2023 11 6 - 20 mg/dL Final   02/19/2023 11 6 - 20 mg/dL Final   02/18/2023 10 6 - 20 mg/dL  Final   02/18/2023 9 6 - 20 mg/dL Final   02/17/2023 9 6 - 20 mg/dL Final   02/17/2023 9 6 - 20 mg/dL Final   02/17/2023 10 6 - 20 mg/dL Final      Creatinine Creatinine   Date Value Ref Range Status   02/20/2023 0.92 0.76 - 1.27 mg/dL Final   02/19/2023 0.89 0.76 - 1.27 mg/dL Final   02/18/2023 0.92 0.76 - 1.27 mg/dL Final   02/18/2023 0.93 0.76 - 1.27 mg/dL Final   02/17/2023 0.94 0.76 - 1.27 mg/dL Final   02/17/2023 0.93 0.76 - 1.27 mg/dL Final   02/17/2023 0.98 0.76 - 1.27 mg/dL Final      Calcium Calcium   Date Value Ref Range Status   02/20/2023 8.9 8.6 - 10.5 mg/dL Final   02/19/2023 8.3 (L) 8.6 - 10.5 mg/dL Final   02/18/2023 8.3 (L) 8.6 - 10.5 mg/dL Final   02/18/2023 7.9 (L) 8.6 - 10.5 mg/dL Final   02/17/2023 7.9 (L) 8.6 - 10.5 mg/dL Final   02/17/2023 8.6 8.6 - 10.5 mg/dL Final   02/17/2023 8.0 (L) 8.6 - 10.5 mg/dL Final      Magnesium Magnesium   Date Value Ref Range Status   02/18/2023 2.5 1.6 - 2.6 mg/dL Final   02/17/2023 3.0 (H) 1.6 - 2.6 mg/dL Final   02/17/2023 2.0 1.6 - 2.6 mg/dL Final   02/17/2023 2.5 1.6 - 2.6 mg/dL Final          aspirin, 81 mg, Oral, Daily  chlorhexidine, 15 mL, Mouth/Throat, Q12H  cholecalciferol, 1,000 Units, Oral, Daily  folic acid (FOLVITE) IVPB, 1 mg, Intravenous, Daily  guaiFENesin, 1,200 mg, Oral, Q12H  insulin lispro, 0-9 Units, Subcutaneous, 4x Daily With Meals & Nightly  metoprolol tartrate, 25 mg, Oral, Q12H  mupirocin, , Each Nare, BID  pantoprazole, 40 mg, Intravenous, Once   Followed by  pantoprazole, 40 mg, Oral, QAM  polyethylene glycol, 17 g, Oral, Daily  senna-docusate sodium, 2 tablet, Oral, Nightly  sodium phosphate IVPB, 15 mmol, Intravenous, Once      sodium chloride, 30 mL/hr, Last Rate: 30 mL/hr (02/17/23 1151)        Patient Active Problem List   Diagnosis Code   • VHD (valvular heart disease) I38   • Marfan syndrome Q87.40   • Severe mitral regurgitation I34.0   • Bicuspid aortic valve Q23.1   • Dyspnea on effort R06.09   • Chest pain, atypical  R07.89   • Mitral valve insufficiency, unspecified etiology I34.0       Assessment & Plan   - Severe mitral valve regurgitation s/p MV repair POD#3 Azeem  - Marfan syndrome  - bicuspid aortic valve with mild insufficiency  - ITP; hematology following  - post op anemia--expected acute blood loss  - leukocytosis--reactive/intraoperative steroids; trending down  -hypophosphatemia    Looks good this morning, sitting up in the chair  2L NC-- wean as able  Great response to IV lasix yesterday--Weight down quite a bit--CO2 elevated--will hold diuresis today switch to PO tomorrow   Remains in SR with rates in the 80s.  Weaned to RA and tolerating   Platelet count 100K this morning-- with IPF will discuss with Dr. Gann about discontinuing AV wires   Mobilize/aggressive pulmonary toilet  Continue routine care    GUILLERMINA Reed  02/20/23  07:14 EST

## 2023-02-20 NOTE — PLAN OF CARE
Goal Outcome Evaluation:  Plan of Care Reviewed With: patient        Progress: improving  Outcome Evaluation: Epicardial wires discontinued without difficulty. SATs on 2LNC 94-95%. When on room air SATs decrease to 85%. Working on IS today. Up ambulating to bathroom and to hallway with assist, gait steady. States no pain unless gets IS to 2500. SR.

## 2023-02-20 NOTE — CASE MANAGEMENT/SOCIAL WORK
Discharge Planning Assessment  [facility]     Patient Name: Regino Fox  MRN: [id number]  Today's Date: 2/20/2023    Admit Date: 2/17/2023    Plan: Home.  Home Health referrals pending.   Discharge Needs Assessment     Row Name 02/20/23 1735       Living Environment    People in Home friend(s)    Name(s) of People in Home Yehuda    Current Living Arrangements apartment    Primary Care Provided by self    Provides Primary Care For no one    Family Caregiver if Needed grandparent(s);friend(s);sibling(s);parent(s)    Family Caregiver Names Pt has alot of family willing to assist.  GrandmotherReginaldo; Parents- Jensen & Sara; Brother; Roommate-Yehuda    Quality of Family Relationships helpful;involved;supportive    Able to Return to Prior Arrangements yes       Resource/Environmental Concerns    Resource/Environmental Concerns home accessibility    Home Accessibility Concerns stairs to enter home    Transportation Concerns none       Transition Planning    Patient/Family Anticipates Transition to home with family    Patient/Family Anticipated Services at Transition home health care    Transportation Anticipated family or friend will provide       Discharge Needs Assessment    Readmission Within the Last 30 Days no previous admission in last 30 days    Equipment Currently Used at Home scales    Concerns to be Addressed discharge planning    Anticipated Changes Related to Illness none    Equipment Needed After Discharge none    Discharge Facility/Level of Care Needs home with home health    Patient's Choice of Community Agency(s) CCP attempting to find a home health agency that will accept.  Pt has [place] Medicaid and lives in [facility]               Discharge Plan     Row Name 02/20/23 1738       Plan    Plan Home;  referrals pending.    Plan Comments CCP spoke with patient, parents- Jensen & Sara, grandmother-Reginaldo and brother at bedside.  CCP received permission from Pt prior to speaking in front  of family.  CCP introduced self and role.  Pt confirmed the address on the face sheet is his parent’s home but he lives with roommate, Yehuda, in a second floor apartment at [address] [place] [address].  Pt has roughly 15 stair steps to his 2nd floor apt.  Pt reports he is IADL’s, unemployed and drives.  Pt has a scale at home and parents have a pulse oximeter he can use at d/c.  Pt reports PCP is, Donal Hutchison.  Pt confirmed pharmacy is, [facility] [place], [place].  Pt is enrolled in [facility].  Pt denies past home health or going to a sub-acute rehab.  Pt plans to return to his home at discharge with assistance of his grandmother Reginaldo Fox ([phone]) who will be staying with him for two weeks post d/c.  Pt denies current discharge needs.  [facility] is [facility] to find an accepting home health agency that will see Pt in [facility], has available staff and accepts Pt [place] Medicaid.  CCP will continue to follow - Gianna SCANLON /CAPRI THOMAS.              Continued Care and Services - Admitted Since 2/17/2023     Home Medical Care     Service Provider Request Status Selected Services Address Phone Fax Patient Preferred    [facility] [place] Pending - Request Sent N/A [address] [address] [phone] --    [facility] - MARIE PROCTOR Declined  Out of network [place]/A [id number] HITESH RODRIGUEZ [address] [address] [phone] --              Expected Discharge Date and Time     Expected Discharge Date Expected Discharge Time    Feb 23, 2023          Demographic Summary     Row Name 02/20/23 7527       General Information    Admission Type inpatient    Arrived From operating room    Referral Source admission list;physician    Reason for Consult discharge planning    Preferred Language English       Contact Information    Permission Granted to Share Info With family/designee               Functional Status      Row Name 02/20/23 1735       Functional Status    Usual Activity Tolerance good    Current Activity Tolerance moderate       Assessment of Health Literacy    Health Literacy Good       Functional Status, IADL    Medications independent    Meal Preparation independent    Housekeeping independent    Laundry independent    Shopping independent       Mental Status    General Appearance WDL WDL       Mental Status Summary    Recent Changes in Mental Status/Cognitive Functioning no changes       Employment/    Employment Status self-employed               Psychosocial    No documentation.                Abuse/Neglect    No documentation.                Legal    No documentation.                Substance Abuse    No documentation.                Patient Forms    No documentation.                   Gianna Smith RN

## 2023-02-20 NOTE — PROGRESS NOTES
"    Patient Name: Regino Fox  :1985  37 y.o.      Patient Care Team:  Donal Hutchison MD as PCP - General (Internal Medicine)  ADRIANNA Bernard MD as Consulting Physician (Cardiology)    Chief Complaint:  Follow up mitral valve regurgitation    Interval History: He is resting in bed. Breathing better now than he was yesterday and this morning.        Objective   Vital Signs  Temp:  [97.9 °F (36.6 °C)-100.3 °F (37.9 °C)] 99.6 °F (37.6 °C)  Heart Rate:  [81-94] 88  Resp:  [17-18] 17  BP: (100-121)/(58-76) 118/73    Intake/Output Summary (Last 24 hours) at 2023 1312  Last data filed at 2023 1213  Gross per 24 hour   Intake 1260 ml   Output 3375 ml   Net -2115 ml     Flowsheet Rows    Flowsheet Row First Filed Value   Admission Height 198.1 cm (78\") Documented at 2023 0559   Admission Weight 83 kg (183 lb) Documented at 2023 0559          Physical Exam:   General Appearance:    Alert, cooperative, in no acute distress   Lungs:     Clear to auscultation.  Normal respiratory effort and rate.      Heart:    Regular rhythm and normal rate, normal S1 and S2, no murmurs, gallops or rubs.     Chest Wall:    No abnormalities observed   Abdomen:     Soft, nontender, positive bowel sounds.     Extremities:   no cyanosis, clubbing or edema.  No marked joint deformities.  Adequate musculoskeletal strength.       Results Review:    Results from last 7 days   Lab Units 23  0311   SODIUM mmol/L 139   POTASSIUM mmol/L 5.0   CHLORIDE mmol/L 103   CO2 mmol/L 30.8*   BUN mg/dL 11   CREATININE mg/dL 0.92   GLUCOSE mg/dL 101*   CALCIUM mg/dL 8.9         Results from last 7 days   Lab Units 23  0311   WBC 10*3/mm3 10.52   HEMOGLOBIN g/dL 10.6*   HEMATOCRIT % 31.9*   PLATELETS 10*3/mm3 100*  100*     Results from last 7 days   Lab Units 23  0311 23  2204 23  1118 23  0952 23  0945   INR   --  1.46* 1.41* 1.5* 1.55*   APTT seconds 34.7  --  31.4  --  31.3 "     Results from last 7 days   Lab Units 02/18/23  0310   MAGNESIUM mg/dL 2.5     Results from last 7 days   Lab Units 02/15/23  0948   CHOLESTEROL mg/dL 146   TRIGLYCERIDES mg/dL 71   HDL CHOL mg/dL 47   LDL CHOL mg/dL 85               Medication Review:   aspirin, 81 mg, Oral, Daily  cholecalciferol, 1,000 Units, Oral, Daily  folic acid (FOLVITE) IVPB, 1 mg, Intravenous, Daily  furosemide, 40 mg, Oral, Daily  guaiFENesin, 1,200 mg, Oral, Q12H  metoprolol tartrate, 25 mg, Oral, Q12H  mupirocin, , Each Nare, BID  pantoprazole, 40 mg, Intravenous, Once   Followed by  pantoprazole, 40 mg, Oral, QAM  polyethylene glycol, 17 g, Oral, Daily  potassium chloride, 20 mEq, Oral, Daily  senna-docusate sodium, 2 tablet, Oral, Nightly         sodium chloride, 30 mL/hr, Last Rate: 30 mL/hr (02/17/23 1151)        Assessment & Plan   1. Symptomatic severe mitral valve regurgitation status post mitral valve repair 2/17/2023  2. Marfan syndrome  3. Bicuspid aortic valve with mild aortic valve insufficiency  4. Post operative ventricular tachycardia - off amiodarone. Continue beta blocker.   5. Chronic mild thrombocytopenia, heme following.   6. Expected post op anemia  7. Post op pericarditis as evidenced by ECG. Not symptomatic. Repeat EKG pending.    Doing well . Continue supportive post op care.    GUILLERMINA Portillo  [facility]  02/20/23  13:12 EST

## 2023-02-20 NOTE — PLAN OF CARE
Goal Outcome Evaluation:  Plan of Care Reviewed With: patient           Outcome Evaluation: Upon entering room, pt. sitting in chair, awake/alert, and agreeable to work with P.T. this date. Pt. able to ambulate 240 feet, CGA x 2, with no use of A.D. this date.  Pt. requires CGA x 1 for sit <-> stand transfers.  Cardiac ther. ex. program x 10 reps completed for general strengthening/stretching.  Mild unsteadiness during ambulation but no overt losses of balance.  Will continue to progress functional mobility as tolerated.      Patient was not wearing a face mask during this therapy encounter. Therapist used appropriate personal protective equipment including eye protection, mask, and gloves.  Mask used was standard procedure mask. Appropriate PPE was worn during the entire therapy session. Hand hygiene was completed before and after therapy session. Patient is not in enhanced droplet precautions.

## 2023-02-21 ENCOUNTER — READMISSION MANAGEMENT (OUTPATIENT)
Dept: CALL CENTER | Facility: HOSPITAL | Age: 38
End: 2023-02-21
Payer: MEDICAID

## 2023-02-21 VITALS
DIASTOLIC BLOOD PRESSURE: 81 MMHG | OXYGEN SATURATION: 94 % | SYSTOLIC BLOOD PRESSURE: 130 MMHG | RESPIRATION RATE: 20 BRPM | HEIGHT: 78 IN | WEIGHT: 177.6 LBS | BODY MASS INDEX: 20.55 KG/M2 | TEMPERATURE: 98.2 F | HEART RATE: 90 BPM

## 2023-02-21 LAB
ANION GAP SERPL CALCULATED.3IONS-SCNC: 7.1 MMOL/L (ref 5–15)
BUN SERPL-MCNC: 11 MG/DL (ref 6–20)
BUN/CREAT SERPL: 13.1 (ref 7–25)
CALCIUM SPEC-SCNC: 8.7 MG/DL (ref 8.6–10.5)
CHLORIDE SERPL-SCNC: 96 MMOL/L (ref 98–107)
CO2 SERPL-SCNC: 27.9 MMOL/L (ref 22–29)
CREAT SERPL-MCNC: 0.84 MG/DL (ref 0.76–1.27)
DEPRECATED RDW RBC AUTO: 40.9 FL (ref 37–54)
EGFRCR SERPLBLD CKD-EPI 2021: 113.8 ML/MIN/1.73
ERYTHROCYTE [DISTWIDTH] IN BLOOD BY AUTOMATED COUNT: 12.9 % (ref 12.3–15.4)
GLUCOSE SERPL-MCNC: 103 MG/DL (ref 65–99)
HCT VFR BLD AUTO: 34.7 % (ref 37.5–51)
HGB BLD-MCNC: 11.7 G/DL (ref 13–17.7)
MCH RBC QN AUTO: 29.5 PG (ref 26.6–33)
MCHC RBC AUTO-ENTMCNC: 33.7 G/DL (ref 31.5–35.7)
MCV RBC AUTO: 87.6 FL (ref 79–97)
PHOSPHATE SERPL-MCNC: 2.4 MG/DL (ref 2.5–4.5)
PLATELET # BLD AUTO: 125 10*3/MM3 (ref 140–450)
PMV BLD AUTO: 12.1 FL (ref 6–12)
POTASSIUM SERPL-SCNC: 4 MMOL/L (ref 3.5–5.2)
RBC # BLD AUTO: 3.96 10*6/MM3 (ref 4.14–5.8)
SODIUM SERPL-SCNC: 131 MMOL/L (ref 136–145)
WBC NRBC COR # BLD: 8.73 10*3/MM3 (ref 3.4–10.8)

## 2023-02-21 PROCEDURE — 85027 COMPLETE CBC AUTOMATED: CPT | Performed by: NURSE PRACTITIONER

## 2023-02-21 PROCEDURE — 94760 N-INVAS EAR/PLS OXIMETRY 1: CPT

## 2023-02-21 PROCEDURE — 84100 ASSAY OF PHOSPHORUS: CPT | Performed by: INTERNAL MEDICINE

## 2023-02-21 PROCEDURE — 94761 N-INVAS EAR/PLS OXIMETRY MLT: CPT

## 2023-02-21 PROCEDURE — 80048 BASIC METABOLIC PNL TOTAL CA: CPT | Performed by: NURSE PRACTITIONER

## 2023-02-21 PROCEDURE — 99232 SBSQ HOSP IP/OBS MODERATE 35: CPT | Performed by: NURSE PRACTITIONER

## 2023-02-21 PROCEDURE — 94799 UNLISTED PULMONARY SVC/PX: CPT

## 2023-02-21 PROCEDURE — 94664 DEMO&/EVAL PT USE INHALER: CPT

## 2023-02-21 PROCEDURE — 94640 AIRWAY INHALATION TREATMENT: CPT

## 2023-02-21 PROCEDURE — 99024 POSTOP FOLLOW-UP VISIT: CPT | Performed by: THORACIC SURGERY (CARDIOTHORACIC VASCULAR SURGERY)

## 2023-02-21 RX ORDER — ATENOLOL 25 MG/1
25 TABLET ORAL
Status: DISCONTINUED | OUTPATIENT
Start: 2023-02-21 | End: 2023-02-21 | Stop reason: HOSPADM

## 2023-02-21 RX ORDER — HYDROCODONE BITARTRATE AND ACETAMINOPHEN 5; 325 MG/1; MG/1
1 TABLET ORAL EVERY 4 HOURS PRN
Qty: 42 TABLET | Refills: 0 | Status: SHIPPED | OUTPATIENT
Start: 2023-02-21 | End: 2023-03-01

## 2023-02-21 RX ORDER — GUAIFENESIN 600 MG/1
1200 TABLET, EXTENDED RELEASE ORAL EVERY 12 HOURS SCHEDULED
Qty: 120 TABLET | Refills: 0 | Status: SHIPPED | OUTPATIENT
Start: 2023-02-21 | End: 2023-03-23

## 2023-02-21 RX ORDER — FOLIC ACID 1 MG/1
1 TABLET ORAL DAILY
Qty: 30 TABLET | Refills: 2 | Status: SHIPPED | OUTPATIENT
Start: 2023-02-21 | End: 2023-05-22

## 2023-02-21 RX ORDER — ATENOLOL 25 MG/1
25 TABLET ORAL EVERY 12 HOURS SCHEDULED
Status: DISCONTINUED | OUTPATIENT
Start: 2023-02-21 | End: 2023-02-21

## 2023-02-21 RX ORDER — FUROSEMIDE 20 MG/1
20 TABLET ORAL DAILY
Status: DISCONTINUED | OUTPATIENT
Start: 2023-02-22 | End: 2023-02-21 | Stop reason: HOSPADM

## 2023-02-21 RX ORDER — IPRATROPIUM BROMIDE AND ALBUTEROL SULFATE 2.5; .5 MG/3ML; MG/3ML
3 SOLUTION RESPIRATORY (INHALATION) EVERY 4 HOURS PRN
Status: DISCONTINUED | OUTPATIENT
Start: 2023-02-21 | End: 2023-02-21 | Stop reason: HOSPADM

## 2023-02-21 RX ORDER — MONTELUKAST SODIUM 10 MG/1
10 TABLET ORAL NIGHTLY
Qty: 30 TABLET | Refills: 2 | Status: SHIPPED | OUTPATIENT
Start: 2023-02-21 | End: 2023-05-22

## 2023-02-21 RX ORDER — ASPIRIN 81 MG/1
81 TABLET ORAL DAILY
Qty: 30 TABLET | Refills: 2 | Status: SHIPPED | OUTPATIENT
Start: 2023-02-22 | End: 2023-05-23

## 2023-02-21 RX ORDER — POTASSIUM CHLORIDE 750 MG/1
10 TABLET, FILM COATED, EXTENDED RELEASE ORAL DAILY
Status: DISCONTINUED | OUTPATIENT
Start: 2023-02-22 | End: 2023-02-21

## 2023-02-21 RX ORDER — ATENOLOL 25 MG/1
25 TABLET ORAL
Qty: 30 TABLET | Refills: 2 | Status: SHIPPED | OUTPATIENT
Start: 2023-02-21 | End: 2023-05-22

## 2023-02-21 RX ORDER — SODIUM CHLORIDE FOR INHALATION 7 %
4 VIAL, NEBULIZER (ML) INHALATION
Status: DISCONTINUED | OUTPATIENT
Start: 2023-02-21 | End: 2023-02-21 | Stop reason: HOSPADM

## 2023-02-21 RX ORDER — MONTELUKAST SODIUM 10 MG/1
10 TABLET ORAL NIGHTLY
Status: DISCONTINUED | OUTPATIENT
Start: 2023-02-21 | End: 2023-02-21 | Stop reason: HOSPADM

## 2023-02-21 RX ORDER — FUROSEMIDE 20 MG/1
20 TABLET ORAL DAILY
Qty: 14 TABLET | Refills: 0 | Status: SHIPPED | OUTPATIENT
Start: 2023-02-22 | End: 2023-03-22

## 2023-02-21 RX ORDER — MELATONIN
1000 DAILY
Qty: 30 TABLET | Refills: 3 | Status: SHIPPED | OUTPATIENT
Start: 2023-02-22 | End: 2023-03-24

## 2023-02-21 RX ORDER — CYCLOBENZAPRINE HCL 10 MG
10 TABLET ORAL EVERY 8 HOURS PRN
Qty: 30 TABLET | Refills: 0 | Status: SHIPPED | OUTPATIENT
Start: 2023-02-21 | End: 2023-03-09 | Stop reason: SDUPTHER

## 2023-02-21 RX ADMIN — FOLIC ACID 1 MG: 5 INJECTION, SOLUTION INTRAMUSCULAR; INTRAVENOUS; SUBCUTANEOUS at 09:28

## 2023-02-21 RX ADMIN — PANTOPRAZOLE SODIUM 40 MG: 40 TABLET, DELAYED RELEASE ORAL at 06:14

## 2023-02-21 RX ADMIN — ATENOLOL 25 MG: 25 TABLET ORAL at 13:41

## 2023-02-21 RX ADMIN — DIBASIC SODIUM PHOSPHATE, MONOBASIC POTASSIUM PHOSPHATE AND MONOBASIC SODIUM PHOSPHATE 1 TABLET: 852; 155; 130 TABLET ORAL at 13:41

## 2023-02-21 RX ADMIN — ASPIRIN 81 MG: 81 TABLET, COATED ORAL at 09:28

## 2023-02-21 RX ADMIN — HYDROCODONE BITARTRATE AND ACETAMINOPHEN 2 TABLET: 5; 325 TABLET ORAL at 06:20

## 2023-02-21 RX ADMIN — GUAIFENESIN 1200 MG: 600 TABLET, EXTENDED RELEASE ORAL at 09:27

## 2023-02-21 RX ADMIN — Medication 4 ML: at 08:40

## 2023-02-21 RX ADMIN — IPRATROPIUM BROMIDE AND ALBUTEROL SULFATE 3 ML: 2.5; .5 SOLUTION RESPIRATORY (INHALATION) at 08:39

## 2023-02-21 RX ADMIN — MUPIROCIN 1 APPLICATION: 20 OINTMENT TOPICAL at 09:28

## 2023-02-21 RX ADMIN — POLYETHYLENE GLYCOL 3350 17 G: 17 POWDER, FOR SOLUTION ORAL at 09:28

## 2023-02-21 RX ADMIN — Medication 1000 UNITS: at 09:27

## 2023-02-21 NOTE — PROGRESS NOTES
Subjective      1/ mild thrombocytopenia after mitral valve replacement, pt has had sepsis before and had DIC AND PLAT COUNT OF 20 THOUSAND 3 YEARS AGO, CHRONIC LOW LEVEL THROMBOCYTOPENIA.     2. AS A CHILD MULTIPLE HOSPITAL ADMISSIONS FOR REPETITIVE INFECTIONS: QUESTION COMBINED IMMUNODEFICIENCY.     3. OSTEOPOROSIS AS A CHILD AND FRACTURED BONES: WHAT KIND OF GENETIC BONE DISORDER IN A PATIENT WITH MARFAN LIKE SYNDROME?.     4. QUESTION RHEUMATIC FEVER . HEART MURMUR PRESENT ONLY  AT AGE 11 AFTER MULTIPLE INFECTIONS.    Interval history:    February 20, 2023:  Hemoglobin today 10.6 with white count of 10.52 and platelet of 100.,  Immature platelet fraction is 14.9 elevated, phosphorus of 1.9 PTT 34.7 fibrinogen 481 fibrin split products greater than 5 but less than 20 thrombin time 14.9.    Dr. Whitley had discussed about considering CT abdomen pelvis when more stable to evaluate for any splenomegaly.  And follow-up with observation.    Patient had received multiple blood products and finally his bleeding had stopped.  Patient had no surgical site bleeding.  Dr. Vicente was considering platelet aggregation studies which were done and platelet aggregation studies were normal.      History of Present Illness  On 02/17/2023 this 37-year-old male has been admitted to the hospital the day before to proceed with mitral valve replacement therapy by Dr. Gann that has been accomplished this morning. We have been consulted because the patient has mild thrombocytopenia. It seems according to the patient's mother with whom I have had an extensive discussion today that he has had thrombocytopenia in the past on multiple occasions, the most severe one 3 years ago when he developed septicemia as a complication of a tooth abscess and ended up unconscious at home and taken to the hospital with septic syndrome requiring multiple blood products administration, mechanical intubation with resuscitation and aggressive antibiotic  therapy with resolution.      The patient has had in laboratory testing done before and available and posted below mild degree of variable thrombocytopenia.      Talking to the patient's mother as well the patient had multiple infections as an infant until he was 5 years old with multiple admissions to the hospital with respiratory infections of all sorts. He was in and out of the hospital all the time. Besides this a murmur was detected in his heart at age 11 and when I mentioned rheumatic fever the patient's mother opened her eyes and said that she probably was that he had this condition then. Obviously raises the question if the murmur is related to a genetic defect or things of this nature or was acquired condition. In any event, he has undergone the replacement of this.      The patient's mother also mentions the fact that the patient was diagnosed with osteoporosis as a child having multiple bone fractures with no major trauma to trigger the phenomenon. This is not enough to call this in my knowledge, osteogenesis imperfecta. 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   On 02/18/2023 the patient was further reviewed. Since yesterday he has been extubated and now he is sitting in a recliner chair. He has not had any external bleeding. I discussed the case with Matthew Gann MD, who mentioned that before the patient's surgery was completed he bled, bled and bled through surgical sites in multiple areas. He received multiple blood products finally stopping bleeding. Raises the question about platelet dysfunction. His PT and PTT before the surgery were normal.     Today besides this the patient has been able to talk, he has expected pain, he has no nausea or vomiting, his vital signs are stable. His phosphorus has been rechecked after phosphorus infusion yesterday, now is normal but this will require to be  checked on daily basis. He eventually required the initiation of vitamin D that also was low.    ·   Past Medical History, Past Surgical History, Social History, Family History have been reviewed and are without significant changes except as mentioned.    Review of Systems   Constitutional: Positive for activity change and fatigue.   HENT: Negative.    Respiratory: Positive for cough and shortness of breath.    Cardiovascular: Negative.    Gastrointestinal: Negative.    Genitourinary: Negative.    Musculoskeletal: Negative.    Skin: Negative.    Neurological: Negative.    Hematological: Negative.    Psychiatric/Behavioral: Negative.         Medications:  The current medication list was reviewed in the EMR    ALLERGIES:  No Known Allergies    Objective      Vitals:    02/21/23 0839 02/21/23 0840 02/21/23 1156 02/21/23 1341   BP:   119/75 130/81   BP Location:   Left arm    Patient Position:   Lying    Pulse:  94 101 90   Resp: 20  20    Temp:   98.2 °F (36.8 °C)    TempSrc:   Oral    SpO2:   94%    Weight:       Height:         Current Status 1/18/2022   ECOG score 0       Physical Exam  Constitutional:       Appearance: He is ill-appearing.   Eyes:      General: No scleral icterus.  Cardiovascular:      Rate and Rhythm: Normal rate and regular rhythm.      Pulses: Normal pulses.      Heart sounds: Normal heart sounds. No murmur heard.    No gallop.   Pulmonary:      Effort: Pulmonary effort is normal. No respiratory distress.      Breath sounds: Normal breath sounds. No wheezing or rales.   Abdominal:      General: Abdomen is flat. There is no distension.      Tenderness: There is no abdominal tenderness. There is no guarding or rebound.   Musculoskeletal:      Right lower leg: No edema.      Left lower leg: No edema.   Lymphadenopathy:      Cervical: No cervical adenopathy.   Skin:     General: Skin is warm and dry.   Neurological:      Mental Status: He is alert. Mental status is at baseline.           RECENT  LABS:  Hematology WBC   Date Value Ref Range Status   02/21/2023 8.73 3.40 - 10.80 10*3/mm3 Final     RBC   Date Value Ref Range Status   02/21/2023 3.96 (L) 4.14 - 5.80 10*6/mm3 Final     Hemoglobin   Date Value Ref Range Status   02/21/2023 11.7 (L) 13.0 - 17.7 g/dL Final     Hematocrit   Date Value Ref Range Status   02/21/2023 34.7 (L) 37.5 - 51.0 % Final     Platelets   Date Value Ref Range Status   02/21/2023 125 (L) 140 - 450 10*3/mm3 Final          Assessment & Plan    On 02/17/2023 the patient has very mild degree of thrombocytopenia, 125,000 and 89,000 but reviewing the platelet count during the last 3 years there is always a fluctuation in the number most of the time. It raises the question if this patient has some chronic phenomenon more than acute phenomenon just exacerbated by his recent heart operation. The surgical site is completely dry. The catheter and all the lines are completely dry. He has no clinical bleeding and this will be further investigated with an immature platelet fraction. I doubt that LDH is going to be of benefit at this point after such a major intervention. It is going to be some degree of hemolysis anyway and LDH is not going to provide any information. I think it is worth it to do a B12 and folic acid levels.      Eventually in the future when the patient is better we would like to do at least a CT scan of the abdomen to evaluate spleen and liver and see if there is any evidence of chronic splenomegaly. The clinical examination makes me to think that maybe indeed the spleen is mildly enlarged but I am not compromising with that physical finding.      Therefore, the platelet count will be watched in absence of any other intervention. Obviously with this kind of number and no bleeding there is no need for blood products.   On further questioning and discussion with Matthew Gann MD, on 02/18/2023 he documented that after completion of the surgery the patient continued bleeding,  bleeding and oozing from multiple sites. He received multiple blood products, finally the bleeding stopped. That raises the question if the patient has some sort of narrative factor that provides him with coagulation issues. I think these numbers eventually will need to be rechecked in a baseline situation in the future. Today he has no external bleeding at the surgical site and none of the lines are bleeding. He has no external bleeding anywhere. I think it is worth it to check platelet aggregation studies.  Platelet aggregation studies normal  · February 20, 2023 platelets 100 with IPF of 14.6  2.  The patient according to the mother had multiple admissions to the hospital as an infant to the point that he used to live in the hospital most of the time in and out and when I mentioned issues pertinent to rheumatic fever she opened her eyes and said likely he had this condition. In any event, this patient is now 37 years old and obviously raises the question given the morphological alterations if this patient has some sort of immunodeficiency and severe combined immunodeficiency. I think it is even worth it to check a level if immunoglobulin G, A and M for baseline.      The patient's white count, white count differential were normal.    On 02/18/2023 it is obvious that the patient has had multiple infections as a child. 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 I think he will benefit from immunoglobulin dose especially now that he is postsurgical time.    ·   3.  The other issue that this patient has had is multiple fractures when he was a young age with no major trauma to trigger this. The patient's mother states that he was called osteoporotic then and raises the question about the nature of this and if he has some form of congenital metabolic bone disorder of some sort not bad enough to call  this osteogenesis imperfecta but enough to trigger this phenomenon. I think it is worth it to at least to do a vitamin D, magnesium, phosphorus level and a PTH.   On 02/18/2023 it is very obvious that the patient had hypophosphatemia, also low level of vitamin D. His phosphate was replaced yesterday. The phosphate level today is normal. He will require eventually initiation of vitamin D supplementation. This will require to be reassessed as well in the future. His PTH was minimally elevated and I think this could obey also to the fact that he has some element of hypocalcemia.     ·      4.  On 02/17/2023 this patient was further reviewed in regard to so many other issues. It caught my attention his very long fingers in his hand. He had hyperelasticity according to the mother when he was a young child. 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 Raises the question if he has Marfanoid issues and eventually he needs to be seen by genetics in regard to analysis of this situation. This could be eventually scheduled.      His mother was actually very appreciative of my conversation with her and the interaction in regard to these other issues that are not precisely pertinent to his thrombocytopenia but had issues that needed to be addressed in some way or the other. 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  On 02/18/2023 the patient eventually will require real global assessment with endocrinological workup and genetics referral.     ·   5.  Further reviewing this patient he has a very low vitamin D level that will require to have correction orally once that he is able to receive food and medication by mouth. The patient has profound hypophosphatemia. This could have a lot to do with issues in regard to metabolism and furthermore this degree of  hypophosphatemia can lead into hemolytic anemia because of lack of energy in the red cell membrane to keep integrity of the red corpuscle. He will require aggressive phosphate replacement therapy as soon as now. Furthermore the patient has very significant folate deficiency and his level of vitamin B12 is very marginal. I will initiate replacement therapy for this.      All these issues are very striking. His PTH level is also elevated and this probably announced the need for this patient eventually to be seen also at Endocrinology. They do not do inpatient hospital visits anymore.   On 02/18/2023 the patient received yesterday during the surgical intervention Solu-Medrol IV. Today his white count is 30,000, I think this is a reflection of steroid stimulus into the release of marginal population of neutrophils into the peripheral blood. Therefore this per se is not an indicator of infection or anything else, neither leukemia. The hemoglobin remains stable, the platelet count is 141,000 today.    On 02/18/2023 I discussed the case with Matthew Gann MD.    *Severe symptomatic mitral valve regurgitation s/p mitral valve repair February 17, 2023    *Marfan syndrome    *Bicuspid aortic valve with mild aortic valve insufficiency    *Postop pericarditis as evidenced by EKG.  Continue supportive care    Plan  · Platelet aggregation studies apparently normal per Dr. Whitley, will check results  · Platelets 100 today with IPF score of 14.6  · Bleeding had stopped at the surgical site  · Repeat PTT normal at 34.7.  Fibrinogen was normal at 481 with fibrin split products greater than 5 but less than 20  · We will follow    Shelbie Epps MD         DURING THE VISIT WITH THE PATIENT TODAY , PATIENT HAD FACE MASK, I HAD PROPER PROTECTIVE EQUIPMENT, AND I DID HAND HYGIENE WITH SOAP AND WATER BEFORE AND AFTER THE VISIT.                   2/21/2023      CC:

## 2023-02-21 NOTE — CASE MANAGEMENT/SOCIAL WORK
Case Management Discharge Note      Final Note: Pt discharged home, 2/21/23 with father transporting.  NO home health could be secured due to payer source and low staffing in [facility] - [facility].         Selected Continued Care - Discharged on 2/21/2023 Admission date: 2/17/2023 - Discharge disposition: Home-Health Care Svc    Destination    No services have been selected for the patient.              Durable Medical Equipment    No services have been selected for the patient.              Dialysis/Infusion    No services have been selected for the patient.              Home Medical Care Patient indicates having no preference.   No services have been selected for the patient.              Therapy    No services have been selected for the patient.              Community Resources    No services have been selected for the patient.              Community & DME    No services have been selected for the patient.                       Final Discharge Disposition Code: 01 - home or self-care

## 2023-02-21 NOTE — CASE MANAGEMENT/SOCIAL WORK
Continued Stay Note  [facility]     Patient Name: Regino Fox  MRN: [id number]  Today's Date: 2/21/2023    Admit Date: 2/17/2023    Plan: Home w/ family assistance  NO home health could be secured due to payer source or lack of staffing in [facility].   Discharge Plan     Row Name 02/21/23 1227       Plan    Plan Home w/ family assistance  NO home health could be secured due to payer source or lack of staffing in [facility].    Plan Comments CCP received denials from all home health agencies that service [facility].  CCP informed DAV Chou.  CCP updated Pt and his father at bedside that no HH could be secured.  Pt will have 24/7 assistance of multiple family members upon discharge including grandmother, Reginaldo who is a retired CNA.  CCP following........[facility]               Discharge Codes    No documentation.               Expected Discharge Date and Time     Expected Discharge Date Expected Discharge Time    Feb 22, 2023             Gianna Smith RN

## 2023-02-21 NOTE — PROGRESS NOTES
"    Patient Name: Regino Fox  :1985  37 y.o.      Patient Care Team:  Donal Hutchison MD as PCP - General (Internal Medicine)  ADRIANNA Bernard MD as Consulting Physician (Cardiology)    Chief Complaint:  Follow up mitral valve regurgitation    Interval History: sitting up on the side of the bed. He feels even better today than he did yesterday. He is on room air. Good UOP.       Objective   Vital Signs  Temp:  [98.3 °F (36.8 °C)-100.3 °F (37.9 °C)] 98.3 °F (36.8 °C)  Heart Rate:  [] 94  Resp:  [17-20] 20  BP: (117-131)/(64-86) 120/64    Intake/Output Summary (Last 24 hours) at 2023 1056  Last data filed at 2023 0731  Gross per 24 hour   Intake 650 ml   Output 2580 ml   Net -1930 ml     Flowsheet Rows    Flowsheet Row First Filed Value   Admission Height 198.1 cm (78\") Documented at 2023 0559   Admission Weight 83 kg (183 lb) Documented at 2023 0559          Physical Exam:   General Appearance:    Alert, cooperative, in no acute distress   Lungs:     Clear to auscultation.  Normal respiratory effort and rate.      Heart:    Regular rhythm and normal rate, normal S1 and S2, no murmurs, gallops or rubs.     Chest Wall:    No abnormalities observed   Abdomen:     Soft, nontender, positive bowel sounds.     Extremities:   no cyanosis, clubbing or edema.  No marked joint deformities.  Adequate musculoskeletal strength.       Results Review:    Results from last 7 days   Lab Units 23  0326   SODIUM mmol/L 131*   POTASSIUM mmol/L 4.0   CHLORIDE mmol/L 96*   CO2 mmol/L 27.9   BUN mg/dL 11   CREATININE mg/dL 0.84   GLUCOSE mg/dL 103*   CALCIUM mg/dL 8.7         Results from last 7 days   Lab Units 23  0326   WBC 10*3/mm3 8.73   HEMOGLOBIN g/dL 11.7*   HEMATOCRIT % 34.7*   PLATELETS 10*3/mm3 125*     Results from last 7 days   Lab Units 23  0311 23  2204 23  1118 23  0952 23  0945   INR   --  1.46* 1.41* 1.5* 1.55*   APTT seconds 34.7  --  " 31.4  --  31.3     Results from last 7 days   Lab Units 02/18/23  0310   MAGNESIUM mg/dL 2.5     Results from last 7 days   Lab Units 02/15/23  0948   CHOLESTEROL mg/dL 146   TRIGLYCERIDES mg/dL 71   HDL CHOL mg/dL 47   LDL CHOL mg/dL 85               Medication Review:   aspirin, 81 mg, Oral, Daily  atenolol, 25 mg, Oral, Q24H  cholecalciferol, 1,000 Units, Oral, Daily  folic acid (FOLVITE) IVPB, 1 mg, Intravenous, Daily  [START ON 2/22/2023] furosemide, 20 mg, Oral, Daily  guaiFENesin, 1,200 mg, Oral, Q12H  montelukast, 10 mg, Oral, Nightly  mupirocin, , Each Nare, BID  pantoprazole, 40 mg, Intravenous, Once   Followed by  pantoprazole, 40 mg, Oral, QAM  phosphorus, 250 mg, Oral, Daily  polyethylene glycol, 17 g, Oral, Daily  senna-docusate sodium, 2 tablet, Oral, Nightly  sodium chloride, 4 mL, Nebulization, BID - RT         sodium chloride, 30 mL/hr, Last Rate: 30 mL/hr (02/17/23 1151)        Assessment & Plan   1. Symptomatic severe mitral valve regurgitation status post mitral valve repair 2/17/2023  2. Marfan syndrome  3. Bicuspid aortic valve with mild aortic valve insufficiency  4. Post operative ventricular tachycardia - off amiodarone. Continue beta blocker. Tele has been stable.   5. Chronic mild thrombocytopenia, heme following.   6. Expected post op anemia  7. Post op pericarditis as evidenced by ECG. Not symptomatic. Repeat EKG pending.    Continues to do well. Hopefully home soon.   Overnight oximetry planned for tonight.    GUILLERMINA Portillo  [facility]  02/21/23  10:56 EST

## 2023-02-21 NOTE — PLAN OF CARE
Goal Outcome Evaluation    Plan of Care Reviewed With: patient    Progress: improving

## 2023-02-21 NOTE — DISCHARGE SUMMARY
Date of Admission: 2/17/2023  Date of Discharge:  2/21/2023    Discharge Diagnosis:   - Severe mitral valve regurgitation s/p MV repair -- Azeem  - Marfan syndrome  - Bicuspid aortic valve with mild insufficiency  - ITP; hematology following  - Post op anemia--expected acute blood loss  - Leukocytosis--reactive/intraoperative steroids; trending down  - Hypophosphatemia    Presenting Problem/History of Present Illness  Mitral valve insufficiency, unspecified etiology [I34.0]   Patient is a 37 y.o. male with PMH of severe mitral valve regurgitation, marfan syndrome, hx of ITP who had been experiencing worsening dyspnea with exertion and fatigue. Patient had a 2D echo that showed moderate mitral regurgitation with eccentric jet and normal ejection fraction. CT of the chest was without thoracic aortic dilation. Patient was seen in office by Dr. Gann last August and at that time Dr. Gann recommended mitral valve repair or replacement with plans for mechanical prosthesis if replacement is necessary.    Hospital Course  Patient presented to our faclity on 2/17 and on that same day underwent Complex mitral valve repair with a 42 mm [employer] flexible band and chordal repair of A2 segment, primary closure of patent foramen ovale, and JEFFERSON (see op-note for more detail). After operation, patient was transferred to CVR in stable condition and was later extubated. Patient had ventricular ectopy after surgery and was placed on amiodarone and lidocaine gtts with improvement. POD1, amiodarone gtt decreased and lidocaine discontinued. Patient AV paced with underlying rhythm sinus bradycardia. Patient on 3LNC and patient diuresed. [place] disconinued and patient later weaned off pressor support. POD2, patient in SR in the 80's, beta blocker increased, patient diuresed due to vascular congestion on CXR. Patient on RA, patient had pericardial rub on exam, platelet count of 91K therefor Lovenox held. Central line, adam catheter, and  chest tubes removed. POD3, patient in SR with rate in 80's. Epicardial pacing wires were removed. POD4, patient deemed ready for discharge home. Patient to follow up in our office in 3-4 weeks with appointment listed below.     Procedures Performed  Procedure(s):  JEFFERSON STERNOTOMY MITRAL VALVE REPAIR AND PRP       Consults:   Consults     Date and Time Order Name Status Description    2/17/2023  3:16 PM Inpatient Cardiology Consult Completed     2/17/2023 11:09 AM Hematology & Oncology Inpatient Consult Completed           Pertinent Test Results:    Lab Results   Component Value Date    WBC 8.73 02/21/2023    HGB 11.7 (L) 02/21/2023    HCT 34.7 (L) 02/21/2023    MCV 87.6 02/21/2023     (L) 02/21/2023      Lab Results   Component Value Date    GLUCOSE 103 (H) 02/21/2023    CALCIUM 8.7 02/21/2023     (L) 02/21/2023    K 4.0 02/21/2023    CO2 27.9 02/21/2023    CL 96 (L) 02/21/2023    BUN 11 02/21/2023    CREATININE 0.84 02/21/2023    EGFRIFNONA 75 01/18/2022    BCR 13.1 02/21/2023    ANIONGAP 7.1 02/21/2023     Lab Results   Component Value Date    INR 1.46 (H) 02/17/2023    PROTIME 17.9 (H) 02/17/2023         Condition on Discharge: Stable    Vital Signs  Temp:  [98.3 °F (36.8 °C)-100.3 °F (37.9 °C)] 98.3 °F (36.8 °C)  Heart Rate:  [] 94  Resp:  [17-20] 20  BP: (117-131)/(64-86) 120/64      Discharge Disposition  Home-Health Care Svc    Discharge Medications     Discharge Medications      New Medications      Instructions Start Date   aspirin 81 MG EC tablet   81 mg, Oral, Daily   Start Date: February 22, 2023     atenolol 25 MG tablet  Commonly known as: TENORMIN   25 mg, Oral, Every 24 Hours Scheduled      cholecalciferol 25 MCG (1000 UT) tablet  Commonly known as: VITAMIN D3   1,000 Units, Oral, Daily   Start Date: February 22, 2023     cyclobenzaprine 10 MG tablet  Commonly known as: FLEXERIL   10 mg, Oral, Every 8 Hours PRN      folic acid 1 MG tablet  Commonly known as: FOLVITE   1 mg, Oral,  Daily      furosemide 20 MG tablet  Commonly known as: LASIX   20 mg, Oral, Daily   Start Date: February 22, 2023     guaiFENesin 600 MG 12 hr tablet  Commonly known as: MUCINEX   1,200 mg, Oral, Every 12 Hours Scheduled      HYDROcodone-acetaminophen 5-325 MG per tablet  Commonly known as: NORCO   1 tablet, Oral, Every 4 Hours PRN      montelukast 10 MG tablet  Commonly known as: SINGULAIR   10 mg, Oral, Nightly      phosphorus 155-852-130 MG tablet  Commonly known as: K PHOS NEUTRAL   1 tablet, Oral, Daily         Stop These Medications    aspirin-acetaminophen-caffeine 250-250-65 MG per tablet  Commonly known as: EXCEDRIN MIGRAINE     chlorhexidine 0.12 % solution  Commonly known as: PERIDEX     Chlorhexidine Gluconate 2 % pads     mupirocin 2 % nasal ointment  Commonly known as: BACTROBAN            Discharge Diet:   Diet Instructions    Cardiac Diet/Low Sodium           Activity at Discharge:   1. No driving until seen in office and off narcotic pain medications.  2. Shower daily. Clean incisions with warm water and antibacterial soap only. Do not put any lotion or ointments on incisions.  3. Ambulate for 10 minutes at least 3 times a day.  4. No heavy lifting > 10lbs until seen in office.   5. Take all medications as prescribed.   Activity Instructions    Follow activity instructions as stated in this discharge packet           Follow-up Appointments  Future Appointments   Date Time Provider Department Center   3/22/2023 10:15 AM Carline Castrejon APRN [facility]     Additional Instructions for the Follow-ups that You Need to Schedule     Ambulatory Referral to Cardiac Rehab   As directed      Ambulatory Referral to Home Health   As directed      Face to Face Visit Date: 2/21/2023    Follow-up provider for Plan of Care?: I will be treating the patient on an ongoing basis.  Please send me the Plan of Care for signature.    Follow-up provider: GUNNAR IBRAHIM [[id number]]    Reason/Clinical Findings: post op open  heart    Describe mobility limitations that make leaving home difficult: weakness    Nursing/Therapeutic Services Requested: Skilled Nursing    Skilled nursing orders: Post CABG care    Frequency: 1 Week 1         Call MD With Problems / Concerns   As directed      Instructions:  Call office at [phone] for any drainage, increased redness, or fever over 100.5    Order Comments: Instructions:  Call office at [phone] for any drainage, increased redness, or fever over 100.5          Discharge Follow-up with PCP   As directed       Currently Documented PCP:    Donal Hutchison MD    PCP Phone Number:    [phone]     Follow Up Details: in 1 week         Discharge Follow-up with Specialty: Cardiologist GUILLERMINA/PA; 1 Week   As directed      Specialty: Cardiologist GUILLERMINA/PA    Follow Up: 1 Week    Follow Up Details: bring all prescription bottles to appointment, call for appointment         Discharge Follow-up with Specified Provider: Cardiologist; 1 Month   As directed      To: Cardiologist    Follow Up: 1 Month    Follow Up Details: call for appointment, bring all medication bottles to appointment         Discharge Follow-up with Specified Provider: Dr. Gann's office in 4 weeks   As directed      To: Dr. Gann's office in 4 weeks    Follow Up Details: 4-6 weeks, bring all current medications to appointment               Test Results Pending at Discharge: None        Epi Cochran PA-C  02/21/23  13:13 EST

## 2023-02-22 ENCOUNTER — APPOINTMENT (OUTPATIENT)
Dept: GENERAL RADIOLOGY | Facility: HOSPITAL | Age: 38
End: 2023-02-22
Payer: MEDICAID

## 2023-02-22 ENCOUNTER — HOSPITAL ENCOUNTER (EMERGENCY)
Facility: HOSPITAL | Age: 38
Discharge: HOME | End: 2023-02-22
Attending: EMERGENCY MEDICINE | Admitting: EMERGENCY MEDICINE
Payer: MEDICAID

## 2023-02-22 ENCOUNTER — TELEPHONE (OUTPATIENT)
Dept: CARDIAC SURGERY | Facility: CLINIC | Age: 38
End: 2023-02-22
Payer: MEDICAID

## 2023-02-22 VITALS
SYSTOLIC BLOOD PRESSURE: 110 MMHG | BODY MASS INDEX: 20.48 KG/M2 | HEART RATE: 86 BPM | TEMPERATURE: 98.6 F | HEIGHT: 78 IN | RESPIRATION RATE: 18 BRPM | WEIGHT: 177 LBS | OXYGEN SATURATION: 93 % | DIASTOLIC BLOOD PRESSURE: 75 MMHG

## 2023-02-22 DIAGNOSIS — R53.83 FATIGUE, UNSPECIFIED TYPE: ICD-10-CM

## 2023-02-22 DIAGNOSIS — M79.10 MYALGIA: ICD-10-CM

## 2023-02-22 DIAGNOSIS — G89.18 POSTOPERATIVE PAIN: Primary | ICD-10-CM

## 2023-02-22 DIAGNOSIS — R06.02 SHORTNESS OF BREATH: ICD-10-CM

## 2023-02-22 LAB
ALBUMIN SERPL-MCNC: 4 G/DL (ref 3.5–5.2)
ALBUMIN/GLOB SERPL: 1.1 G/DL
ALP SERPL-CCNC: 63 U/L (ref 39–117)
ALT SERPL W P-5'-P-CCNC: 23 U/L (ref 1–41)
ANION GAP SERPL CALCULATED.3IONS-SCNC: 11.8 MMOL/L (ref 5–15)
AST SERPL-CCNC: 25 U/L (ref 1–40)
B PARAPERT DNA SPEC QL NAA+PROBE: NOT DETECTED
B PERT DNA SPEC QL NAA+PROBE: NOT DETECTED
BASOPHILS # BLD AUTO: 0.07 10*3/MM3 (ref 0–0.2)
BASOPHILS NFR BLD AUTO: 0.8 % (ref 0–1.5)
BH BB BLOOD EXPIRATION DATE: NORMAL
BH BB BLOOD EXPIRATION DATE: NORMAL
BH BB BLOOD TYPE BARCODE: 6200
BH BB BLOOD TYPE BARCODE: 6200
BH BB DISPENSE STATUS: NORMAL
BH BB DISPENSE STATUS: NORMAL
BH BB PRODUCT CODE: NORMAL
BH BB PRODUCT CODE: NORMAL
BH BB UNIT NUMBER: NORMAL
BH BB UNIT NUMBER: NORMAL
BILIRUB SERPL-MCNC: 0.7 MG/DL (ref 0–1.2)
BILIRUB UR QL STRIP: NEGATIVE
BUN SERPL-MCNC: 14 MG/DL (ref 6–20)
BUN/CREAT SERPL: 13 (ref 7–25)
C PNEUM DNA NPH QL NAA+NON-PROBE: NOT DETECTED
CALCIUM SPEC-SCNC: 9.4 MG/DL (ref 8.6–10.5)
CHLORIDE SERPL-SCNC: 98 MMOL/L (ref 98–107)
CLARITY UR: CLEAR
CO2 SERPL-SCNC: 26.2 MMOL/L (ref 22–29)
COLOR UR: YELLOW
CREAT SERPL-MCNC: 1.08 MG/DL (ref 0.76–1.27)
CROSSMATCH INTERPRETATION: NORMAL
CROSSMATCH INTERPRETATION: NORMAL
DEPRECATED RDW RBC AUTO: 42.9 FL (ref 37–54)
EGFRCR SERPLBLD CKD-EPI 2021: 89.5 ML/MIN/1.73
EOSINOPHIL # BLD AUTO: 0.21 10*3/MM3 (ref 0–0.4)
EOSINOPHIL NFR BLD AUTO: 2.3 % (ref 0.3–6.2)
ERYTHROCYTE [DISTWIDTH] IN BLOOD BY AUTOMATED COUNT: 13.2 % (ref 12.3–15.4)
FLUAV SUBTYP SPEC NAA+PROBE: NOT DETECTED
FLUBV RNA ISLT QL NAA+PROBE: NOT DETECTED
GLOBULIN UR ELPH-MCNC: 3.7 GM/DL
GLUCOSE SERPL-MCNC: 137 MG/DL (ref 65–99)
GLUCOSE UR STRIP-MCNC: NEGATIVE MG/DL
HADV DNA SPEC NAA+PROBE: NOT DETECTED
HCOV 229E RNA SPEC QL NAA+PROBE: NOT DETECTED
HCOV HKU1 RNA SPEC QL NAA+PROBE: NOT DETECTED
HCOV NL63 RNA SPEC QL NAA+PROBE: NOT DETECTED
HCOV OC43 RNA SPEC QL NAA+PROBE: NOT DETECTED
HCT VFR BLD AUTO: 37 % (ref 37.5–51)
HGB BLD-MCNC: 12.5 G/DL (ref 13–17.7)
HGB UR QL STRIP.AUTO: NEGATIVE
HMPV RNA NPH QL NAA+NON-PROBE: NOT DETECTED
HOLD SPECIMEN: NORMAL
HOLD SPECIMEN: NORMAL
HPIV1 RNA ISLT QL NAA+PROBE: NOT DETECTED
HPIV2 RNA SPEC QL NAA+PROBE: NOT DETECTED
HPIV3 RNA NPH QL NAA+PROBE: NOT DETECTED
HPIV4 P GENE NPH QL NAA+PROBE: NOT DETECTED
IMM GRANULOCYTES # BLD AUTO: 0.41 10*3/MM3 (ref 0–0.05)
IMM GRANULOCYTES NFR BLD AUTO: 4.5 % (ref 0–0.5)
KETONES UR QL STRIP: NEGATIVE
LEUKOCYTE ESTERASE UR QL STRIP.AUTO: NEGATIVE
LYMPHOCYTES # BLD AUTO: 0.91 10*3/MM3 (ref 0.7–3.1)
LYMPHOCYTES NFR BLD AUTO: 9.9 % (ref 19.6–45.3)
M PNEUMO IGG SER IA-ACNC: NOT DETECTED
MAGNESIUM SERPL-MCNC: 2.1 MG/DL (ref 1.6–2.6)
MCH RBC QN AUTO: 29.8 PG (ref 26.6–33)
MCHC RBC AUTO-ENTMCNC: 33.8 G/DL (ref 31.5–35.7)
MCV RBC AUTO: 88.1 FL (ref 79–97)
MONOCYTES # BLD AUTO: 1.26 10*3/MM3 (ref 0.1–0.9)
MONOCYTES NFR BLD AUTO: 13.7 % (ref 5–12)
NEUTROPHILS NFR BLD AUTO: 6.34 10*3/MM3 (ref 1.7–7)
NEUTROPHILS NFR BLD AUTO: 68.8 % (ref 42.7–76)
NITRITE UR QL STRIP: NEGATIVE
NRBC BLD AUTO-RTO: 0 /100 WBC (ref 0–0.2)
NT-PROBNP SERPL-MCNC: 1989 PG/ML (ref 0–450)
PH UR STRIP.AUTO: 7 [PH] (ref 5–8)
PLATELET # BLD AUTO: 188 10*3/MM3 (ref 140–450)
PMV BLD AUTO: 11.9 FL (ref 6–12)
POTASSIUM SERPL-SCNC: 4.4 MMOL/L (ref 3.5–5.2)
PROCALCITONIN SERPL-MCNC: 3.14 NG/ML (ref 0–0.25)
PROT SERPL-MCNC: 7.7 G/DL (ref 6–8.5)
PROT UR QL STRIP: ABNORMAL
QT INTERVAL: 372 MS
RBC # BLD AUTO: 4.2 10*6/MM3 (ref 4.14–5.8)
RHINOVIRUS RNA SPEC NAA+PROBE: NOT DETECTED
RSV RNA NPH QL NAA+NON-PROBE: NOT DETECTED
SARS-COV-2 RNA NPH QL NAA+NON-PROBE: NOT DETECTED
SODIUM SERPL-SCNC: 136 MMOL/L (ref 136–145)
SP GR UR STRIP: 1.01 (ref 1–1.03)
TROPONIN T SERPL HS-MCNC: 752 NG/L
UNIT  ABO: NORMAL
UNIT  ABO: NORMAL
UNIT  RH: NORMAL
UNIT  RH: NORMAL
UROBILINOGEN UR QL STRIP: ABNORMAL
WBC NRBC COR # BLD: 9.2 10*3/MM3 (ref 3.4–10.8)
WHOLE BLOOD HOLD COAG: NORMAL
WHOLE BLOOD HOLD SPECIMEN: NORMAL

## 2023-02-22 PROCEDURE — 93005 ELECTROCARDIOGRAM TRACING: CPT

## 2023-02-22 PROCEDURE — 83880 ASSAY OF NATRIURETIC PEPTIDE: CPT | Performed by: EMERGENCY MEDICINE

## 2023-02-22 PROCEDURE — 80053 COMPREHEN METABOLIC PANEL: CPT

## 2023-02-22 PROCEDURE — 84145 PROCALCITONIN (PCT): CPT | Performed by: EMERGENCY MEDICINE

## 2023-02-22 PROCEDURE — 0202U NFCT DS 22 TRGT SARS-COV-2: CPT | Performed by: EMERGENCY MEDICINE

## 2023-02-22 PROCEDURE — 84484 ASSAY OF TROPONIN QUANT: CPT

## 2023-02-22 PROCEDURE — 81003 URINALYSIS AUTO W/O SCOPE: CPT

## 2023-02-22 PROCEDURE — 99284 EMERGENCY DEPT VISIT MOD MDM: CPT

## 2023-02-22 PROCEDURE — 93010 ELECTROCARDIOGRAM REPORT: CPT | Performed by: INTERNAL MEDICINE

## 2023-02-22 PROCEDURE — 96375 TX/PRO/DX INJ NEW DRUG ADDON: CPT

## 2023-02-22 PROCEDURE — 96374 THER/PROPH/DIAG INJ IV PUSH: CPT

## 2023-02-22 PROCEDURE — 85025 COMPLETE CBC W/AUTO DIFF WBC: CPT

## 2023-02-22 PROCEDURE — 71045 X-RAY EXAM CHEST 1 VIEW: CPT

## 2023-02-22 PROCEDURE — 36415 COLL VENOUS BLD VENIPUNCTURE: CPT

## 2023-02-22 PROCEDURE — 25010000002 HYDROMORPHONE PER 4 MG: Performed by: EMERGENCY MEDICINE

## 2023-02-22 PROCEDURE — 25010000002 ONDANSETRON PER 1 MG: Performed by: EMERGENCY MEDICINE

## 2023-02-22 PROCEDURE — 83735 ASSAY OF MAGNESIUM: CPT

## 2023-02-22 RX ORDER — HYDROMORPHONE HYDROCHLORIDE 1 MG/ML
0.5 INJECTION, SOLUTION INTRAMUSCULAR; INTRAVENOUS; SUBCUTANEOUS ONCE
Status: COMPLETED | OUTPATIENT
Start: 2023-02-22 | End: 2023-02-22

## 2023-02-22 RX ORDER — SODIUM CHLORIDE 0.9 % (FLUSH) 0.9 %
10 SYRINGE (ML) INJECTION AS NEEDED
Status: DISCONTINUED | OUTPATIENT
Start: 2023-02-22 | End: 2023-02-22 | Stop reason: HOSPADM

## 2023-02-22 RX ORDER — ONDANSETRON 2 MG/ML
4 INJECTION INTRAMUSCULAR; INTRAVENOUS ONCE
Status: COMPLETED | OUTPATIENT
Start: 2023-02-22 | End: 2023-02-22

## 2023-02-22 RX ADMIN — HYDROMORPHONE HYDROCHLORIDE 0.5 MG: 1 INJECTION, SOLUTION INTRAMUSCULAR; INTRAVENOUS; SUBCUTANEOUS at 17:59

## 2023-02-22 RX ADMIN — ONDANSETRON 4 MG: 2 INJECTION INTRAMUSCULAR; INTRAVENOUS at 16:09

## 2023-02-22 NOTE — OUTREACH NOTE
Prep Survey    Flowsheet Row Responses   [facility] facility patient discharged from? [place]   Is LACE score < 7 ? No   Eligibility Readm Mgmt   Discharge diagnosis  Severe mitral valve regurgitation s/p MV repair -- Pagni   Does the patient have one of the following disease processes/diagnoses(primary or secondary)? Cardiothoracic surgery   Does the patient have Home health ordered? No   Is there a DME ordered? No   Medication alerts for this patient ASA, Lasix    Prep survey completed? Yes          Kacy NORRIS - Registered Nurse

## 2023-02-22 NOTE — ED PROVIDER NOTES
EMERGENCY DEPARTMENT ENCOUNTER    Room Number:  17/17  Date seen:  2/22/2023  PCP: Donal Hutchison MD  Historian: Patient, family      HPI:  Chief Complaint: Shortness of breath, body aches, chest pain  A complete HPI/ROS/PMH/PSH/SH/FH are unobtainable due to: Nothing  Context: Regino Fox is a 37 y.o. male who presents to the ED by private vehicle from home c/o shortness of breath, body aches, and chest pain.  Patient had mitral valve repair done on 2/17/2023 by Dr. Gann.  He was discharged yesterday.  Since this morning, he complains of shortness of breath that is constant but worse with exertion.  Also complains of diffuse chest pain and body aches.  Chest pain is worse with movement and breathing.  Temperature was 100.1 last night.  Also reports nausea and increased generalized weakness..  Denies cough, abdominal pain, vomiting, diarrhea, leg pain, or leg swelling.  He contacted Dr. Gann's office and was advised to come to the ED for further evaluation            PAST MEDICAL HISTORY  Active Ambulatory Problems     Diagnosis Date Noted   • VHD (valvular heart disease) 08/08/2022   • Marfan syndrome 08/16/2022   • Severe mitral regurgitation 08/16/2022   • Bicuspid aortic valve 08/16/2022   • Dyspnea on effort 08/16/2022   • Chest pain, atypical 08/16/2022   • Mitral valve insufficiency, unspecified etiology 02/17/2023     Resolved Ambulatory Problems     Diagnosis Date Noted   • No Resolved Ambulatory Problems     Past Medical History:   Diagnosis Date   • Asthma    • Broken ankle, left, sequela    • CHF (congestive heart failure) ([facility])    • Congenital heart disease    • Dyspnea on exertion    • Heart murmur    • Heart valve disease    • History of migraine    • Hypertension    • Mitral valve prolapse    • Scoliosis    • TIA (transient ischemic attack)          PAST SURGICAL HISTORY  Past Surgical History:   Procedure Laterality Date   • FEMUR SURGERY Right     X2 FOR FRACTURE AGE 11   • LYMPH NODE  DISSECTION      NECK   • MITRAL VALVE REPAIR/REPLACEMENT N/A 2/17/2023    Procedure: JEFFERSON STERNOTOMY MITRAL VALVE REPAIR AND PRP;  Surgeon: Matthew Gann MD;  Location: [facility];  Service: Cardiothoracic;  Laterality: N/A;   • TRANSESOPHAGEAL ECHOCARDIOGRAM (JEFFERSON) N/A 08/11/2022    Procedure: TRANSESOPHAGEAL ECHOCARDIOGRAM;  Surgeon: ADRIANNA Bernard MD;  Location:  [facility];  Service: Cardiology;  Laterality: N/A;         FAMILY HISTORY  Family History   Problem Relation Age of Onset   • Aortic aneurysm Mother    • Heart attack Mother    • No Known Problems Father    • Malig Hyperthermia Neg Hx          SOCIAL HISTORY  Social History     Socioeconomic History   • Marital status: Single   Tobacco Use   • Smoking status: Former     Types: Cigarettes     Quit date: 2020     Years since quitting: 3.1   • Smokeless tobacco: Never   • Tobacco comments:     MORE OF SOCIAL SMOKER FOR 8 YEARS   Vaping Use   • Vaping Use: Every day   • Substances: Nicotine, Flavoring   • Devices: Disposable   Substance and Sexual Activity   • Alcohol use: Yes     Comment: rarely   • Drug use: Never   • Sexual activity: Defer         ALLERGIES  Patient has no known allergies.        REVIEW OF SYSTEMS  Review of Systems     All systems have been reviewed and are negative except as as discussed in the HPI    PHYSICAL EXAM  ED Triage Vitals   Temp Heart Rate Resp BP SpO2   02/22/23 1430 02/22/23 1426 02/22/23 1430 02/22/23 1430 02/22/23 1426   97.6 °F (36.4 °C) 84 18 107/67 97 %      Temp src Heart Rate Source Patient Position BP Location FiO2 (%)   02/22/23 1430 02/22/23 1430 02/22/23 1430 02/22/23 1430 --   Tympanic Monitor Sitting Right arm        Physical Exam      GENERAL: Awake, alert, oriented x3.  Well-developed, well-nourished male.  Appears uncomfortable   HENT: NCAT, nares patent  EYES: no scleral icterus  CV: regular rhythm, normal rate, equal radial and pedal pulses bilaterally  RESPIRATORY: normal effort, clear to  auscultation bilaterally  ABDOMEN: soft, nontender  MUSCULOSKELETAL: Extremities are nontender with full range of motion.  There is tenderness over the mid anterior chest wall.  No calf tenderness.  No pedal edema.  Neck is supple  NEURO: Speech is normal.  No facial droop.  Follows commands  PSYCH:  calm, cooperative  SKIN: warm, dry    Vital signs and nursing notes reviewed.          LAB RESULTS  Recent Results (from the past 24 hour(s))   Prepare RBC, 2 Units    Collection Time: 02/22/23  7:25 AM   Result Value Ref Range    Product Code C6724C01     Unit Number [id number]     UNIT  ABO A     UNIT  RH POS     Crossmatch Interpretation Compatible     Dispense Status RE     Blood Expiration Date 202303062359     Blood Type Barcode 6200     Product Code T8888K58     Unit Number [id number]     UNIT  ABO A     UNIT  RH POS     Crossmatch Interpretation Compatible     Dispense Status RE     Blood Expiration Date 202303062359     Blood Type Barcode 6200    ECG 12 Lead ED Triage Standing Order; Weak / Dizzy / AMS    Collection Time: 02/22/23  2:37 PM   Result Value Ref Range    QT Interval 372 ms   Comprehensive Metabolic Panel    Collection Time: 02/22/23  2:52 PM    Specimen: Blood   Result Value Ref Range    Glucose 137 (H) 65 - 99 mg/dL    BUN 14 6 - 20 mg/dL    Creatinine 1.08 0.76 - 1.27 mg/dL    Sodium 136 136 - 145 mmol/L    Potassium 4.4 3.5 - 5.2 mmol/L    Chloride 98 98 - 107 mmol/L    CO2 26.2 22.0 - 29.0 mmol/L    Calcium 9.4 8.6 - 10.5 mg/dL    Total Protein 7.7 6.0 - 8.5 g/dL    Albumin 4.0 3.5 - 5.2 g/dL    ALT (SGPT) 23 1 - 41 U/L    AST (SGOT) 25 1 - 40 U/L    Alkaline Phosphatase 63 39 - 117 U/L    Total Bilirubin 0.7 0.0 - 1.2 mg/dL    Globulin 3.7 gm/dL    A/G Ratio 1.1 g/dL    BUN/Creatinine Ratio 13.0 7.0 - 25.0    Anion Gap 11.8 5.0 - 15.0 mmol/L    eGFR 89.5 >60.0 mL/min/1.73   Single High Sensitivity Troponin T    Collection Time: 02/22/23  2:52 PM    Specimen: Blood   Result Value Ref  Range    HS Troponin T 752 (C) <15 ng/L   Magnesium    Collection Time: 02/22/23  2:52 PM    Specimen: Blood   Result Value Ref Range    Magnesium 2.1 1.6 - 2.6 mg/dL   Green Top (Gel)    Collection Time: 02/22/23  2:52 PM   Result Value Ref Range    Extra Tube Hold for add-ons.    Lavender Top    Collection Time: 02/22/23  2:52 PM   Result Value Ref Range    Extra Tube hold for add-on    Gold Top - SST    Collection Time: 02/22/23  2:52 PM   Result Value Ref Range    Extra Tube Hold for add-ons.    Light Blue Top    Collection Time: 02/22/23  2:52 PM   Result Value Ref Range    Extra Tube Hold for add-ons.    CBC Auto Differential    Collection Time: 02/22/23  2:52 PM    Specimen: Blood   Result Value Ref Range    WBC 9.20 3.40 - 10.80 10*3/mm3    RBC 4.20 4.14 - 5.80 10*6/mm3    Hemoglobin 12.5 (L) 13.0 - 17.7 g/dL    Hematocrit 37.0 (L) 37.5 - 51.0 %    MCV 88.1 79.0 - 97.0 fL    MCH 29.8 26.6 - 33.0 pg    MCHC 33.8 31.5 - 35.7 g/dL    RDW 13.2 12.3 - 15.4 %    RDW-SD 42.9 37.0 - 54.0 fl    MPV 11.9 6.0 - 12.0 fL    Platelets 188 140 - 450 10*3/mm3    Neutrophil % 68.8 42.7 - 76.0 %    Lymphocyte % 9.9 (L) 19.6 - 45.3 %    Monocyte % 13.7 (H) 5.0 - 12.0 %    Eosinophil % 2.3 0.3 - 6.2 %    Basophil % 0.8 0.0 - 1.5 %    Immature Grans % 4.5 (H) 0.0 - 0.5 %    Neutrophils, Absolute 6.34 1.70 - 7.00 10*3/mm3    Lymphocytes, Absolute 0.91 0.70 - 3.10 10*3/mm3    Monocytes, Absolute 1.26 (H) 0.10 - 0.90 10*3/mm3    Eosinophils, Absolute 0.21 0.00 - 0.40 10*3/mm3    Basophils, Absolute 0.07 0.00 - 0.20 10*3/mm3    Immature Grans, Absolute 0.41 (H) 0.00 - 0.05 10*3/mm3    nRBC 0.0 0.0 - 0.2 /100 WBC   BNP    Collection Time: 02/22/23  2:52 PM    Specimen: Blood   Result Value Ref Range    proBNP 1,989.0 (H) 0.0 - 450.0 pg/mL   Procalcitonin    Collection Time: 02/22/23  2:52 PM    Specimen: Blood   Result Value Ref Range    Procalcitonin 3.14 (H) 0.00 - 0.25 ng/mL   Urinalysis With Microscopic If Indicated (No Culture)  - Urine, Clean Catch    Collection Time: 02/22/23  3:46 PM    Specimen: Urine, Clean Catch   Result Value Ref Range    Color, UA Yellow Yellow, Straw    Appearance, UA Clear Clear    pH, UA 7.0 5.0 - 8.0    Specific Gravity, UA 1.015 1.005 - 1.030    Glucose, UA Negative Negative    Ketones, UA Negative Negative    Bilirubin, UA Negative Negative    Blood, UA Negative Negative    Protein, UA Trace (A) Negative    Leuk Esterase, UA Negative Negative    Nitrite, UA Negative Negative    Urobilinogen, UA 4.0 E.U./dL (A) 0.2 - 1.0 E.U./dL   Respiratory Panel PCR w/COVID-19(SARS-CoV-2) MARIE/BRYAN/LEANNE/PAD/COR/MAD/SUSAN In-House, NP Swab in UTM/VTM, 3-4 HR TAT - Swab, Nasopharynx    Collection Time: 02/22/23  3:46 PM    Specimen: Nasopharynx; Swab   Result Value Ref Range    ADENOVIRUS, PCR Not Detected Not Detected    Coronavirus 229E Not Detected Not Detected    Coronavirus HKU1 Not Detected Not Detected    Coronavirus NL63 Not Detected Not Detected    Coronavirus OC43 Not Detected Not Detected    COVID19 Not Detected Not Detected - Ref. Range    Human Metapneumovirus Not Detected Not Detected    Human Rhinovirus/Enterovirus Not Detected Not Detected    Influenza A PCR Not Detected Not Detected    Influenza B PCR Not Detected Not Detected    Parainfluenza Virus 1 Not Detected Not Detected    Parainfluenza Virus 2 Not Detected Not Detected    Parainfluenza Virus 3 Not Detected Not Detected    Parainfluenza Virus 4 Not Detected Not Detected    RSV, PCR Not Detected Not Detected    Bordetella pertussis pcr Not Detected Not Detected    Bordetella parapertussis PCR Not Detected Not Detected    Chlamydophila pneumoniae PCR Not Detected Not Detected    Mycoplasma pneumo by PCR Not Detected Not Detected       Ordered the above labs and reviewed the results.        RADIOLOGY  XR Chest 1 View    Result Date: 2/22/2023  XR CHEST 1 VW-  HISTORY: Male who is 37 years-old,  weakness, right pneumothorax  TECHNIQUE: Frontal view of the chest   COMPARISON: 02/20/2023  FINDINGS: Heart size is normal. Sternotomy wires, cardiac valve marker noted. Pulmonary vasculature is unremarkable. Previous right apical pneumothorax is decreased, measuring about 1.3 cm, previously measured at 1.8 cm. Mild right more than left pleural effusions persists. No right basilar atelectasis or infiltrate appears decreased. No left pneumothorax. No acute osseous process.      Partial improvement.  This report was finalized on 2/22/2023 2:49 PM by Dr. Gómez Parker M.D.        Ordered the above noted radiological studies. Reviewed by me in PACS.            PROCEDURES  Procedures              MEDICATIONS GIVEN IN ER  Medications   sodium chloride 0.9 % flush 10 mL (has no administration in time range)   ondansetron (ZOFRAN) injection 4 mg (4 mg Intravenous Given 2/22/23 1609)   HYDROmorphone (DILAUDID) injection 0.5 mg (0.5 mg Intravenous Given 2/22/23 1759)                   MEDICAL DECISION MAKING, PROGRESS, and CONSULTS    All labs have been independently reviewed by me.  All radiology studies have been reviewed by me and I have also reviewed the radiology report.   EKG's independently viewed and interpreted by me.  Discussion below represents my analysis of pertinent findings related to patient's condition, differential diagnosis, treatment plan and final disposition.      Additional sources:  - Discussed/ obtained information from independent historians: Family at bedside    - External (non-ED) record review: Patient was admitted here 2/17 through 2/21/2023 for a mitral valve repair.  This was performed by Dr. Gann.  Patient was discharged on aspirin, atenolol, Lasix, and Norco.    - Chronic or social conditions impacting care: N/A          Orders placed during this visit:  Orders Placed This Encounter   Procedures   • Respiratory Panel PCR w/COVID-19(SARS-CoV-2) MARIE/BRYAN/LEANNE/PAD/COR/MAD/SUSAN In-House, NP Swab in UTM/VTM, 3-4 HR TAT - Swab, Nasopharynx   • XR Chest 1 View    • [place] Draw   • Comprehensive Metabolic Panel   • Single High Sensitivity Troponin T   • Magnesium   • Urinalysis With Microscopic If Indicated (No Culture) - Urine, Clean Catch   • CBC Auto Differential   • BNP   • Procalcitonin   • NPO Diet NPO Type: Strict NPO   • Undress & Gown   • Cardiac Monitoring   • Continuous Pulse Oximetry   • Vital Signs   • Orthostatic Blood Pressure   • Surgery (on-call MD unless specified)   • Oxygen Therapy- Nasal Cannula; 2 LPM; Titrate for SPO2: 92%, Greater Than or Equal To   • POC Glucose Once   • ECG 12 Lead ED Triage Standing Order; Weak / Dizzy / AMS   • Insert Peripheral IV   • Fall Precautions   • CBC & Differential   • Green Top (Gel)   • Lavender Top   • Gold Top - SST   • Light Blue Top         Additional orders considered but not ordered:  N/A        Differential diagnosis:    Pneumonia, worsening pneumothorax, congestive heart failure, bronchitis, URI, electrolyte abnormality, dehydration, postoperative pain      Independent interpretation of labs, radiology studies, and discussions with consultants:  ED Course as of 02/22/23 1829 Wed Feb 22, 2023   1515 SpO2: 97 %  On room air [WH]   1515 Chest x-ray personally interpreted by me.  My personal interpretation is: Heart size is normal.  There are bilateral pleural effusions.  No focal infiltrate.    Per the radiologist, there is a decreased right apical pneumothorax.  There are persistent bilateral pleural effusions.  Right basilar atelectasis/infiltrate is decreased. [WH]   1529 Patient presents to the ED complaining of shortness of breath, chest pain, weakness, body aches, nausea since this morning.  He was discharged from here yesterday after having mitral valve replacement done 5 days ago.  He is not hypoxic, tachycardic, or febrile.  Chest x-ray does not show any acute abnormalities.  Will obtain labs and EKG for further evaluation.  Case will be discussed with Dr. Gann. [WH]   1618 HS Troponin T(!!): 75  [WH]   1615 proBNP(!): 1,989.0 [WH]   1615 Magnesium: 2.1 [WH]   1615 WBC: 9.20  Urinalysis is normal [WH]   1633 Procalcitonin(!): 3.14 [WH]   1655 EKG personally interpreted by me.  My personal interpretation is:         EKG time: 1437  Rhythm/Rate: Sinus rhythm, rate 84  P waves and IN: LAE  QRS, axis: Normal axis, RSR prime in V1 and V2  ST and T waves: Minimal ST elevation in the anterior leads, no ST depression    Interpreted Contemporaneously by me, independently viewed  EKG is not significantly changed compared to prior EKG done on 2/20/2023   [WH]   1709 Case discussed with Dr. Gann.  He is very familiar with the patient.  Pertinent history, exam findings, test results, ED course, and diagnoses were discussed with him.  He says the patient can be discharged and then should call the office tomorrow to check in. [WH]   1815 Test results discussed with the patient and his family.  He is resting and breathing comfortably.  He reports that he is feeling much better.  I informed him of my conversation with Dr. Gann and the plan for discharge.  Patient was advised to call Dr. Gann's office tomorrow.  Return precautions were discussed. [WH]   1828 Patient presented to the ED complaining of chest pain, shortness of breath, and muscle aches.  He had valve surgery several days ago and was discharged home yesterday.  Work-up was basically unremarkable.  Case was discussed with his cardiothoracic surgeon.  Patient was not hypoxic.  Chest x-ray showed improving effusions and pneumothorax.  Suspect his symptoms are due to normal postoperative symptoms.  He was not dehydrated.  He was afebrile.  There was no evidence of pneumonia or UTI [WH]      ED Course User Index  [WH] London York MD               DIAGNOSIS  Final diagnoses:   Postoperative pain   Shortness of breath   Myalgia   Fatigue, unspecified type         DISPOSITION  DISCHARGE    Patient discharged in stable condition.    Reviewed implications of  results, diagnosis, meds, responsibility to follow up, warning signs and symptoms of possible worsening, potential complications and reasons to return to ER, including worsening/persistent symptoms, fever, vomiting, abdominal pain, or other concern.    Patient/Family voiced understanding of above instructions.    Discussed plan for discharge, as there is no emergent indication for admission. Patient referred to primary care provider for BP management due to today's BP. Pt/family is agreeable and understands need for follow up and repeat testing.  Pt is aware that discharge does not mean that nothing is wrong but it indicates no emergency is present that requires admission and they must continue care with follow-up as given below or physician of their choice.     FOLLOW-UP  Matthew Gann MD  [address] [address]  [phone]    Call in 1 day           Medication List      No changes were made to your prescriptions during this visit.                   Latest Documented Vital Signs:  As of 18:29 EST  BP- 107/67 HR- 83 Temp- 97.6 °F (36.4 °C) (Tympanic) O2 sat- 97%              --    Please note that portions of this were completed with a voice recognition program.       Note Disclaimer: At [facility], we believe that sharing information builds trust and better relationships. You are receiving this note because you are receiving care at [facility] or recently visited. It is possible you will see health information before a provider has talked with you about it. This kind of information can be easy to misunderstand. To help you fully understand what it means for your health, we urge you to discuss this note with your provider.           London York MD  02/22/23 7925

## 2023-02-22 NOTE — DISCHARGE INSTRUCTIONS
Call Dr. Gann's office tomorrow.  Continue taking your home medications.  Return to the emergency department for worsening symptoms, temperature over 100.5, vomiting, or other concern.

## 2023-02-22 NOTE — ED TRIAGE NOTES
Pt had mitral valve repair on Friday. Pt was discharged 2/21. Today pt states that he body aches, nausea, generalized weakness that started this am when he woke.     This RN wore mask and goggles during time of contact

## 2023-02-22 NOTE — TELEPHONE ENCOUNTER
"Pt called to report that he is having several problems after surgery with Dr. Gann. Pt reports metallic taste, yellow skin, shortness of breath, being unable to cough, and numbness in fingers. Pt also reports that he felt \"3 jolts from the pacemaker\" that he had after surgery that has since been removed and \"a ripped stitch at the bottom of his incision\". Pt feels that he never should have left the hospital. Notified GUILLERMINA Romero, who recommends pt go to [facility] to be evaluated and transferred to [place] if needed. Pt stated that he would prefer to come straight to [place], so pt will head to ER now.   "

## 2023-02-22 NOTE — PAYOR COMM NOTE
"Regino So (37 y.o. Male)                         ATTENTION; DISCHARGE CASE REF #[id number] - PATIENT DISCHARGED 2/21/23                            Date of Birth   1985    Social Security Number       Address   [address] [place], [place] [address]    Home Phone   [phone]    MRN   [id number]       [facility]   [facility] [place]    Marital Status   Single                            Admission Date   2/17/23    Admission Type   Elective    Admitting Provider   Gunnar Gann MD    Attending Provider       Department, Room/Bed   [facility] CARDIOVASC UNIT, 2224/1       Discharge Date   2/21/2023    Discharge Disposition   Home-Health Care Svc    Discharge Destination                               Attending Provider: (none)   Allergies: No Known Allergies    Isolation: None   Infection: None   Code Status: Prior    Ht: 198.1 cm (78\")   Wt: 80.6 kg (177 lb 9.6 oz)    Admission Cmt: None   Principal Problem: Severe mitral regurgitation [I34.0]                 Active Insurance as of 2/17/2023     Primary Coverage     Payor Plan Insurance Group Employer/Plan Group    [facility] MEDICAID [facility] MEDICAID [id number]     Payor Plan Address Payor Plan Phone Number Payor Plan Fax Number Effective Dates    [address] [address] [phone]  7/2/2019 - None Entered    [facility] [id number]       Subscriber Name Subscriber Birth Date Member ID       REGINO SO 1985 [id number]                 Emergency Contacts      (Rel.) Home Phone Work Phone Mobile Phone    ARPIT SO (Father) [phone] -- [phone]    Sara So (Mother) [phone] -- [phone]            Discharge Order (From admission, onward)     Start     Ordered    02/21/23 1159  Discharge patient  Once        Expected Discharge Date: 02/21/23    Discharge Disposition: Home-Health Care Svc    Physician of Record for Attribution - Please select from Treatment Team: GUNNAR GANN [[id number]]  "   Review needed by CMO to determine Physician of Record: No       Question Answer Comment   Physician of Record for Attribution - Please select from Treatment Team GUNNAR IBRAHIM    Review needed by CMO to determine Physician of Record No        02/21/23 5775

## 2023-02-24 ENCOUNTER — READMISSION MANAGEMENT (OUTPATIENT)
Dept: CALL CENTER | Facility: HOSPITAL | Age: 38
End: 2023-02-24
Payer: MEDICAID

## 2023-03-01 ENCOUNTER — TELEPHONE (OUTPATIENT)
Dept: CARDIAC SURGERY | Facility: CLINIC | Age: 38
End: 2023-03-01
Payer: MEDICAID

## 2023-03-01 ENCOUNTER — OFFICE VISIT (OUTPATIENT)
Dept: CARDIOLOGY | Facility: CLINIC | Age: 38
End: 2023-03-01
Payer: MEDICAID

## 2023-03-01 VITALS
SYSTOLIC BLOOD PRESSURE: 103 MMHG | BODY MASS INDEX: 21.01 KG/M2 | HEART RATE: 85 BPM | WEIGHT: 181.6 LBS | DIASTOLIC BLOOD PRESSURE: 67 MMHG | HEIGHT: 78 IN

## 2023-03-01 DIAGNOSIS — Z98.890 STATUS POST MITRAL VALVE REPAIR: ICD-10-CM

## 2023-03-01 DIAGNOSIS — R42 LIGHTHEADEDNESS: Primary | ICD-10-CM

## 2023-03-01 DIAGNOSIS — Q87.40 MARFAN SYNDROME: ICD-10-CM

## 2023-03-01 PROCEDURE — 99214 OFFICE O/P EST MOD 30 MIN: CPT

## 2023-03-01 NOTE — PROGRESS NOTES
"Chief Complaint  Fatigue, Shortness of Breath, and Hospital Follow Up Visit    Subjective        History of Present Illness  Regino Fox presents to [facility] CARDIOLOGY for follow up.  37-year-old male with past medical history of severe mitral valve regurgitation, Marfan syndrome, history of ITP who is status post complex mitral valve repair with a 42 mm [employer] flexible band and chordal repair of A2 segment, primary closure of patent foramen ovale on February 17, 2023 with Dr. Gann.He is doing well overall.  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  He states he is ready to run a marathon or do jumping jacks.  His only complaint is occasional lightheadedness or \"woozy \"feeling.  He denies any chest pain or discomfort, dyspnea, orthopnea, edema, syncope or presyncope.      PMH  Past Medical History:   Diagnosis Date   • Asthma    • Bicuspid aortic valve    • Broken ankle, left, sequela     HX   • CHF (congestive heart failure) (HCC)    • Congenital heart disease    • Dyspnea on exertion    • Heart murmur    • Heart valve disease    • History of migraine    • Hypertension     QUIT TAKING MEDS AGE 19 DUE TO INSURANCE REASONS   • Marfan syndrome    • Mitral valve prolapse    • Scoliosis    • TIA (transient ischemic attack)     HX       ALLERGY  No Known Allergies     SURGICALHX  Past Surgical History:   Procedure Laterality Date   • FEMUR SURGERY Right     X2 FOR FRACTURE AGE 11   • LYMPH NODE DISSECTION      NECK   • MITRAL VALVE REPAIR/REPLACEMENT N/A 2/17/2023    Procedure: JEFFERSON STERNOTOMY MITRAL VALVE REPAIR AND PRP;  Surgeon: Matthew Gann MD;  [facility];  Service: Cardiothoracic;  Laterality: N/A;   • TRANSESOPHAGEAL ECHOCARDIOGRAM (JEFFERSON) N/A 08/11/2022    Procedure: TRANSESOPHAGEAL ECHOCARDIOGRAM;  Surgeon: ADRIANNA Bernard MD;  Location: [facility] ENDOSCOPY;  Service: Cardiology;  Laterality: N/A;        SOC  Social History     Socioeconomic History   • " "Marital status: Single   Tobacco Use   • Smoking status: Former     Types: Cigarettes     Quit date: 2020     Years since quitting: 3.1   • Smokeless tobacco: Never   • Tobacco comments:     MORE OF SOCIAL SMOKER FOR 8 YEARS   Vaping Use   • Vaping Use: Former   • Substances: Nicotine, Flavoring   • Devices: Disposable   Substance and Sexual Activity   • Alcohol use: Yes     Comment: rarely   • Drug use: Never   • Sexual activity: Defer       FAMHX  Family History   Problem Relation Age of Onset   • Aortic aneurysm Mother    • Heart attack Mother    • No Known Problems Father    • Malig Hyperthermia Neg Hx         MEDSIGONLY  Current Outpatient Medications on File Prior to Visit   Medication Sig   • aspirin 81 MG EC tablet Take 1 tablet by mouth Daily for 90 days.   • atenolol (TENORMIN) 25 MG tablet Take 1 tablet by mouth Daily for 90 days.   • cholecalciferol (VITAMIN D3) 25 MCG (1000 UT) tablet Take 1 tablet by mouth Daily for 30 days.   • cyclobenzaprine (FLEXERIL) 10 MG tablet Take 1 tablet by mouth Every 8 (Eight) Hours As Needed for Muscle Spasms.   • folic acid (FOLVITE) 1 MG tablet Take 1 tablet by mouth Daily for 90 days.   • furosemide (LASIX) 20 MG tablet Take 1 tablet by mouth Daily for 14 days.   • guaiFENesin (MUCINEX) 600 MG 12 hr tablet Take 2 tablets by mouth Every 12 (Twelve) Hours for 30 days.   • HYDROcodone-acetaminophen (NORCO) 5-325 MG per tablet Take 1 tablet by mouth Every 4 (Four) Hours As Needed for Moderate Pain for up to 7 days.   • montelukast (SINGULAIR) 10 MG tablet Take 1 tablet by mouth Every Night for 90 days.   • phosphorus (K PHOS NEUTRAL) 155-852-130 MG tablet Take 1 tablet by mouth Daily for 14 days.     No current facility-administered medications on file prior to visit.       Objective   Vitals:    03/01/23 1049   BP: 103/67   Pulse: 85   Weight: 82.4 kg (181 lb 9.6 oz)   Height: 198.1 cm (78\")         Physical Exam  Constitutional:       General: He is awake. He is not in " acute distress.     Appearance: Normal appearance.   HENT:      Head: Normocephalic.      Nose: Nose normal. No congestion.   Eyes:      Extraocular Movements: Extraocular movements intact.      Conjunctiva/sclera: Conjunctivae normal.      Pupils: Pupils are equal, round, and reactive to light.   Neck:      Thyroid: No thyromegaly.      Vascular: No JVD.   Cardiovascular:      Rate and Rhythm: Normal rate and regular rhythm.      Chest Wall: PMI is not displaced.      Pulses: Normal pulses.      Heart sounds: Normal heart sounds, S1 normal and S2 normal. No murmur heard.    No friction rub. No gallop. No S3 or S4 sounds.   Pulmonary:      Effort: Pulmonary effort is normal.      Breath sounds: Normal breath sounds. No wheezing, rhonchi or rales.   Abdominal:      General: Bowel sounds are normal.      Palpations: Abdomen is soft.      Tenderness: There is no abdominal tenderness.   Musculoskeletal:      Cervical back: No tenderness.      Right lower leg: No edema.      Left lower leg: No edema.   Lymphadenopathy:      Cervical: No cervical adenopathy.   Skin:     General: Skin is warm and dry.      Capillary Refill: Capillary refill takes less than 2 seconds.      Coloration: Skin is not cyanotic.      Findings: No petechiae or rash.      Nails: There is no clubbing.      Comments: Midsternal incision is well approximated and healing.  Chest tube sites have intact scabs.  No drainage.  No redness.   Neurological:      Mental Status: He is alert.   Psychiatric:         Mood and Affect: Mood normal.         Behavior: Behavior is cooperative.           Result Review     The following data was reviewed by GUILLERMINA Mcgrath on 03/01/23.    proBNP   Date Value Ref Range Status   02/22/2023 1,989.0 (H) 0.0 - 450.0 pg/mL Final     CMP    CMP 2/20/23 2/21/23 2/22/23   Glucose 101 (A) 103 (A) 137 (A)   BUN 11 11 14   Creatinine 0.92 0.84 1.08   eGFR 108.5 113.8 89.5   Sodium 139 131 (A) 136   Potassium 5.0 4.0 4.4    Chloride 103 96 (A) 98   Calcium 8.9 8.7 9.4   Total Protein   7.7   Albumin   4.0   Globulin   3.7   Total Bilirubin   0.7   Alkaline Phosphatase   63   AST (SGOT)   25   ALT (SGPT)   23   Albumin/Globulin Ratio   1.1   BUN/Creatinine Ratio 12.0 13.1 13.0   Anion Gap 5.2 7.1 11.8   (A) Abnormal value            CBC w/diff    CBC w/Diff 2/20/23 2/20/23 2/21/23 2/22/23    0311 0311     WBC 10.52  8.73 9.20   RBC 3.62 (A)  3.96 (A) 4.20   Hemoglobin 10.6 (A)  11.7 (A) 12.5 (A)   Hematocrit 31.9 (A)  34.7 (A) 37.0 (A)   MCV 88.1  87.6 88.1   MCH 29.3  29.5 29.8   MCHC 33.2  33.7 33.8   RDW 12.8  12.9 13.2   Platelets 100 (A) 100 (A) 125 (A) 188   Neutrophil Rel %    68.8   Immature Granulocyte Rel %    4.5 (A)   Lymphocyte Rel %    9.9 (A)   Monocyte Rel %    13.7 (A)   Eosinophil Rel %    2.3   Basophil Rel %    0.8   (A) Abnormal value             Lipid Panel    Lipid Panel 2/15/23   Total Cholesterol 146   Triglycerides 71   HDL Cholesterol 47   VLDL Cholesterol 14   LDL Cholesterol  85   LDL/HDL Ratio 1.80             Results for orders placed during the hospital encounter of 09/14/22    Stress Test With Myocardial Perfusion One Day    Interpretation Summary  · Left ventricular ejection fraction is normal. (Calculated EF = 53%).  · Myocardial perfusion imaging indicates a normal myocardial perfusion study with no evidence of ischemia.  · Impressions are consistent with a low risk study.  · Findings consistent with a normal ECG stress test.       Assessment and Plan   Diagnoses and all orders for this visit:    1. Lightheadedness (Primary)  -     Basic Metabolic Panel; Future  -     CBC & Differential; Future    2. Marfan syndrome    3. Status post mitral valve repair      1.  We will check a BMP and CBC as he reports that he is feeling lightheaded occasionally and had some anemia postoperatively.  2.  Continue beta-blockers.  Plan to repeat an echo at next follow up visit.  3. Doing well overall. He is eager to  get back to work . I spent a great deal of time encouraging him to take it easy and give himself time to heal. He is not cleared to return to work until he sees the surgeon next month.   He is worried about his blood pressure being on the low side but presents with a blood pressure log that is normal. He was reassured.      Follow Up   No follow-ups on file.    Patient was given instructions and counseling regarding his condition or for health maintenance advice. Please see specific information pulled into the AVS if appropriate.     Shala Carr, APRN  03/01/23  15:47 EST    Dictated Utilizing Dragon Dictation

## 2023-03-01 NOTE — TELEPHONE ENCOUNTER
Per , unable to be released to go back to work on Monday 3/6/23. Notified  of 's recommendation.  wanting to know the earliest he could be released. Advised I would discuss with  and let him know. He states if he is not going to be released then he is going to find a part time job. He states he followed up with Cardiology today and they stated he is doing well. He states he could do jumping jacks if he wanted. Advised of sternal precautions and time needed for sternal healing/bone fusion.

## 2023-03-01 NOTE — TELEPHONE ENCOUNTER
----- Message from Codi White Rep sent at 3/1/2023 10:56 AM EST -----  Regarding: WORK RELEASE  PT CALLED WANTING TO KNOW IF DR. IBRAHIM DISCUSSED WORK RELEASE THIS MORNING. HE CAN BE REACHED -[phone]

## 2023-03-06 ENCOUNTER — TELEPHONE (OUTPATIENT)
Dept: CARDIOLOGY | Facility: CLINIC | Age: 38
End: 2023-03-06
Payer: MEDICAID

## 2023-03-06 NOTE — TELEPHONE ENCOUNTER
Per GUILLERMINA Smalls: Can you please call the patient and let him know that there was nothing in his lab work that explain his dizziness or lightheadedness.  See how he is feeling.  See if he is feeling any better.  See if his surgeon cleared him to return to work?    SW patient. Patient denies light headedness or dizziness. Patient states his surgeon has not released him at this time.

## 2023-03-09 ENCOUNTER — TELEPHONE (OUTPATIENT)
Dept: CARDIAC SURGERY | Facility: CLINIC | Age: 38
End: 2023-03-09
Payer: MEDICAID

## 2023-03-09 ENCOUNTER — READMISSION MANAGEMENT (OUTPATIENT)
Dept: CALL CENTER | Facility: HOSPITAL | Age: 38
End: 2023-03-09
Payer: MEDICAID

## 2023-03-09 DIAGNOSIS — Z95.2 S/P MVR (MITRAL VALVE REPLACEMENT): Primary | ICD-10-CM

## 2023-03-09 RX ORDER — CYCLOBENZAPRINE HCL 5 MG
5 TABLET ORAL 3 TIMES DAILY PRN
Qty: 60 TABLET | Refills: 0 | Status: SHIPPED | OUTPATIENT
Start: 2023-03-09 | End: 2023-03-22

## 2023-03-09 RX ORDER — TRAMADOL HYDROCHLORIDE 50 MG/1
50 TABLET ORAL EVERY 6 HOURS PRN
Qty: 21 TABLET | Refills: 0 | Status: SHIPPED | OUTPATIENT
Start: 2023-03-09

## 2023-03-09 NOTE — OUTREACH NOTE
CT Surgery Week 3 Survey    Flowsheet Row Responses   [facility] patient discharged from? [facility]   Does the patient have one of the following disease processes/diagnoses(primary or secondary)? Cardiothoracic surgery   Week 3 attempt successful? Yes   Call start time 1405   Call end time 1418   Discharge diagnosis  Severe mitral valve regurgitation s/p MV repair -- [facility]   Meds reviewed with patient/caregiver? Yes   Is the patient having any side effects they believe may be caused by any medication additions or changes? No   Does the patient have all medications related to this admission filled (includes all antibiotics, pain medications, cardiac medications, etc.) Yes   Is the patient taking all medications as directed (includes completed medication regime)? Yes   Does the patient have a primary care provider?  Yes   Does the patient have an appointment scheduled with their C/T surgeon? Yes   Has the patient kept scheduled appointments due by today? Yes   Has home health visited the patient within 72 hours of discharge? Yes   Psychosocial issues? No   Did the patient receive a copy of their discharge instructions? Yes   Nursing interventions Reviewed instructions with patient   What is the patient's perception of their health status since discharge? Improving   Nursing interventions Nurse provided patient education   Is the patient/caregiver able to teach back normal signs of recovery? Pain or discomfort at incisional site   Nursing interventions Reassured on normal signs of recovery   Is the patient /caregiver able to teach back basic post-op care? Hold pillow to support chest when coughing, If the steri-strips are falling off, it is okay to remove them. (If applicable)   Is the patient/caregiver able to teach back signs and symptoms of incisional infection? Increased redness, swelling or pain at the incisonal site, Increased drainage or bleeding, Incisional warmth, Pus or odor from incision, Fever   Is  the patient/caregiver able to teach back steps to recovery at home? Set small, achievable goals for return to baseline health, Rest and rebuild strength, gradually increase activity, Eat a well-balance diet   Is the patient/caregiver able to teach back the hierarchy of who to call/visit for symptoms/problems? PCP, Specialist, Home health nurse, Urgent Care, ED, 911 Yes   Additional teach back comments pt plans to do own home PT, exercising, walking dog, uses [employer]   Week 3 call completed? Yes          Chanel CAMPOS - Registered Nurse

## 2023-03-09 NOTE — TELEPHONE ENCOUNTER
Pt called to report that he is still having significant post op pain. Notified GUILLERMINA Buckley, who sent in ultram and flexeril to pharmacy. Notified pt of new orders.

## 2023-03-13 ENCOUNTER — TELEPHONE (OUTPATIENT)
Dept: CARDIAC SURGERY | Facility: CLINIC | Age: 38
End: 2023-03-13
Payer: MEDICAID

## 2023-03-13 NOTE — TELEPHONE ENCOUNTER
Pt called to report that he is needing  A return to work letter because he is losing money and things that he owns. Pt states he is getting close to forging a letter of clearance. Pt states he feels great and has been doing jumping jacks and push ups every night. Advised pt that he should not be doing either of these activities due to sternal precautions. Advised pt that he cannot be released to work unless Dr. Gann agrees. Dr. Gann is out of the office and unreachable by phone until 3/23/23. Pt states he is currently going to the cardiology office to see if they will clear him.

## 2023-03-22 ENCOUNTER — OFFICE VISIT (OUTPATIENT)
Dept: CARDIAC SURGERY | Facility: CLINIC | Age: 38
End: 2023-03-22
Payer: MEDICAID

## 2023-03-22 VITALS
OXYGEN SATURATION: 96 % | DIASTOLIC BLOOD PRESSURE: 74 MMHG | HEIGHT: 78 IN | HEART RATE: 88 BPM | BODY MASS INDEX: 19.69 KG/M2 | SYSTOLIC BLOOD PRESSURE: 117 MMHG | RESPIRATION RATE: 18 BRPM | WEIGHT: 170.2 LBS | TEMPERATURE: 97.1 F

## 2023-03-22 DIAGNOSIS — Z98.890 S/P MVR (MITRAL VALVE REPAIR): Primary | ICD-10-CM

## 2023-03-22 PROCEDURE — 99024 POSTOP FOLLOW-UP VISIT: CPT

## 2023-03-22 PROCEDURE — 1160F RVW MEDS BY RX/DR IN RCRD: CPT

## 2023-03-22 PROCEDURE — 1159F MED LIST DOCD IN RCRD: CPT

## 2023-03-22 NOTE — PROGRESS NOTES
"CARDIOVASCULAR SURGERY FOLLOW-UP PROGRESS NOTE  Chief Complaint: Postop        HPI:   Dear Donal Parra MD and colleagues:    It was nice to see Regino Fox in follow up 5 weeks after surgery.  As you know, he is a 37 y.o. male with history of suspected Marfan syndrome, bicuspid AV, anemia, and ITP who underwent mitral valve repair on 2/17 with Dr. Gann. He did well postoperatively and continues to do well. He comes in today complaining of nothing.  His activity level has been good.  From a surgical standpoint, the sternal incision is well approximated without erythema, edema, or drainage.  The sternum is stable to palpation, and the patient denies any popping or clicking with deep inspiration or coughing.      Physical Exam:         /74 (BP Location: Left arm, Patient Position: Sitting, Cuff Size: Adult)   Pulse 88   Temp 97.1 °F (36.2 °C)   Resp 18   Ht 198.1 cm (78\")   Wt 77.2 kg (170 lb 3.2 oz)   SpO2 96%   BMI 19.67 kg/m²   Heart:  regular rate and rhythm, S1, S2 normal, no murmur, click, rub or gallop  Lungs:  clear to auscultation bilaterally  Extremities:  no edema  Incision(s):  mid chest healing well, no significant drainage, no dehiscence, no significant erythema    Assessment/Plan:     S/P MVR. Overall, he is doing well.  His activity level has been good, and he wants to go back to work.  He is a  so I will discuss with Dr. Gann tomorrow.  He should be followed closely by his cardiologist, Dr. Phipps in [place] due to his history of bicuspid AV and suspected Marfan syndrome by . Last CT of the chest in 8/2022 was without thoracic aortic dilation.     No significant post-op complications    Prophylactic antibiotics before dental work and other surgeries lifelong with artificial valve, prosthesis, or grafting    No heavy lifting > 10 pounds for 1 more weeks  Keep incisions clean and dry  OK to drive if not taking narcotic pain medicine  OK to begin cardiac " rehab  Follow-up as scheduled with cardiology  Follow-up as scheduled with PCP  Discussed with Dr. Gann--repeat an echocardiogram in 6 months and follow-up with me in office    Continue lifting restriction of 10 lbs until 6 weeks and 50 lbs until 12 weeks from the date of surgery, no excessive jarring motions or twisting motions until 12 weeks from the date of surgery    Return to clinic if any signs or symptoms of infection or sternal instability develop     Thank you for allowing me to participate in the care of your patient.    Regards,  Carline Castrejon, APRN

## 2023-03-24 ENCOUNTER — HOSPITAL ENCOUNTER (OUTPATIENT)
Dept: GENERAL RADIOLOGY | Facility: HOSPITAL | Age: 38
Discharge: HOME | End: 2023-03-24
Payer: MEDICAID

## 2023-03-24 DIAGNOSIS — Z98.890 S/P MVR (MITRAL VALVE REPAIR): ICD-10-CM

## 2023-03-24 DIAGNOSIS — Z98.890 S/P MVR (MITRAL VALVE REPAIR): Primary | ICD-10-CM

## 2023-03-24 PROCEDURE — 71046 X-RAY EXAM CHEST 2 VIEWS: CPT

## 2023-03-27 ENCOUNTER — OFFICE VISIT (OUTPATIENT)
Dept: CARDIOLOGY | Facility: CLINIC | Age: 38
End: 2023-03-27
Payer: MEDICAID

## 2023-03-27 VITALS
BODY MASS INDEX: 20.9 KG/M2 | HEART RATE: 97 BPM | SYSTOLIC BLOOD PRESSURE: 115 MMHG | DIASTOLIC BLOOD PRESSURE: 77 MMHG | WEIGHT: 180.6 LBS | HEIGHT: 78 IN

## 2023-03-27 DIAGNOSIS — I35.1 NONRHEUMATIC AORTIC VALVE INSUFFICIENCY: ICD-10-CM

## 2023-03-27 DIAGNOSIS — I34.0 NONRHEUMATIC MITRAL VALVE REGURGITATION: Primary | ICD-10-CM

## 2023-03-27 PROCEDURE — 99214 OFFICE O/P EST MOD 30 MIN: CPT | Performed by: INTERNAL MEDICINE

## 2023-03-27 NOTE — PROGRESS NOTES
Chief Complaint  valvular heart disease    Subjective        Regino Fox presents to [facility] CARDIOLOGY  History of present illness:    Patient states overall he is doing well postsurgery.  He is doing some walking around with no symptoms.  His blood pressures been running under good control.      Past Medical History:   Diagnosis Date   • Asthma    • Bicuspid aortic valve    • Broken ankle, left, sequela     HX   • CHF (congestive heart failure) ([facility])    • Congenital heart disease    • Dyspnea on exertion    • Heart murmur    • Heart valve disease    • History of migraine    • Hypertension     QUIT TAKING MEDS AGE 19 DUE TO INSURANCE REASONS   • Marfan syndrome    • Mitral valve prolapse    • Scoliosis    • TIA (transient ischemic attack)     HX         Past Surgical History:   Procedure Laterality Date   • FEMUR SURGERY Right     X2 FOR FRACTURE AGE 11   • LYMPH NODE DISSECTION      NECK   • MITRAL VALVE REPAIR/REPLACEMENT N/A 2/17/2023    Procedure: JEFFERSON STERNOTOMY MITRAL VALVE REPAIR AND PRP;  Surgeon: Mathtew Gann MD;  Location: [facility];  Service: Cardiothoracic;  Laterality: N/A;   • TRANSESOPHAGEAL ECHOCARDIOGRAM (JEFFERSON) N/A 08/11/2022    Procedure: TRANSESOPHAGEAL ECHOCARDIOGRAM;  Surgeon: ADRIANNA Bernard MD;  Location: [facility];  Service: Cardiology;  Laterality: N/A;          Social History     Socioeconomic History   • Marital status: Single   Tobacco Use   • Smoking status: Former     Types: Cigarettes     Quit date: 2020     Years since quitting: 3.2   • Smokeless tobacco: Never   • Tobacco comments:     MORE OF SOCIAL SMOKER FOR 8 YEARS   Vaping Use   • Vaping Use: Former   • Substances: Nicotine, Flavoring   • Devices: Disposable   Substance and Sexual Activity   • Alcohol use: Yes     Comment: rarely   • Drug use: Never   • Sexual activity: Defer         Family History   Problem Relation Age of Onset   • Aortic aneurysm Mother    • Heart attack Mother    • No  "Known Problems Father    • Malig Hyperthermia Neg Hx           No Known Allergies         Current Outpatient Medications:   •  aspirin 81 MG EC tablet, Take 1 tablet by mouth Daily for 90 days., Disp: 30 tablet, Rfl: 2  •  atenolol (TENORMIN) 25 MG tablet, Take 1 tablet by mouth Daily for 90 days., Disp: 30 tablet, Rfl: 2  •  folic acid (FOLVITE) 1 MG tablet, Take 1 tablet by mouth Daily for 90 days., Disp: 30 tablet, Rfl: 2  •  montelukast (SINGULAIR) 10 MG tablet, Take 1 tablet by mouth Every Night for 90 days., Disp: 30 tablet, Rfl: 2  •  traMADol (Ultram) 50 MG tablet, Take 1 tablet by mouth Every 6 (Six) Hours As Needed for Moderate Pain., Disp: 21 tablet, Rfl: 0      ROS:  Cardiac review of systems negative.    Objective     /77   Pulse 97   Ht 198.1 cm (78\")   Wt 81.9 kg (180 lb 9.6 oz)   BMI 20.87 kg/m²       General Appearance:   · well developed  · well nourished  HENT:   · oropharynx moist  · lips not cyanotic  Respiratory:  · no respiratory distress  · normal breath sounds  · no rales  Cardiovascular:  · no jugular venous distention  · regular rhythm  · S1 normal, S2 normal  · no S3, no S4   · no murmur  · no rub, no thrill  · No carotid bruit  · pedal pulses normal  · lower extremity edema: none    Musculoskeletal:  · no clubbing of fingers.   · normocephalic, head atraumatic  Skin:   · warm, dry  Psychiatric:  · judgement and insight appropriate  · normal mood and affect    ECHO:  Results for orders placed in visit on 02/17/23    diagnostic intraoperative JEFFERSON with 2D and 3d imaging and follow up color Doppler study    Narrative  Diagnostic intraoperative JEFFERSON with 2D and 3d imaging and follow up color Doppler study    Procedure Performed: diagnostic intraoperative JEFFERSON with 2D and 3d imaging and follow up color Doppler study  Start Time:  2/17/2023 7:40 AM  End Time:   2/17/2023 8:34 AM      General Procedure Information  Diagnostic Indications for Echo:  assessment of surgical " repair  Physician Requesting Echo: Matthew Gann MD  Location performed:  OR  Intubated  Bite block placed  Heart visualized  Probe Insertion:  Easy  Modalities:  2D only, color flow mapping, continuous wave Doppler and pulse wave Doppler    Echocardiographic and Doppler Measurements    Ventricles    Right Ventricle:  Cavity size dilated.  Global function normal.  Left Ventricle:  Cavity size dilated.  Global Function mildly impaired.  Ejection Fraction 45%.        Valves    Aortic Valve:  Annulus normal.  Stenosis not present.  Regurgitation moderate.    Mitral Valve:  Annulus dilated.  Stenosis not present.  Regurgitation severe.  Leaflet motions prolapsed.  Specific leaflet segments with abnormal motions are described in the following comments:  A2A3    Tricuspid Valve:  Annulus normal.  Regurgitation mild.      Aorta    Aortic Arch:  Size normal.  Descending Aorta:  Size normal.      Atria      Left Atrium:  Size dilated.  Left atrial appendage normal.                  Anesthesia Information  Performed Personally      Echocardiogram Comments:  Diagnosis: non-rheumatic mitral regurgitation.  Eccentric wall-hugging mitral regurgitation jet, posteriorly directed.  Enormous billowing anterior leaflet, with prolapse, mostly at A2 and A3 near medial commissure.  There is also posterior leaflet prolapse above annulus but the anterior leaflet prolapses above posterior causing posterior directed jet.  Moderate aortic insufficiency, central jet.    Postoperatively, no mitral regurgitation, mean gradient through mitral valve was 2 mm Hg, annuloplasty ring in place    STRESS:  Results for orders placed during the hospital encounter of 09/14/22    Stress Test With Myocardial Perfusion One Day    Interpretation Summary  · Left ventricular ejection fraction is normal. (Calculated EF = 53%).  · Myocardial perfusion imaging indicates a normal myocardial perfusion study with no evidence of ischemia.  · Impressions are  consistent with a low risk study.  · Findings consistent with a normal ECG stress test.    CATH:  No results found for this or any previous visit.    BMP:   Glucose   Date Value Ref Range Status   02/22/2023 137 (H) 65 - 99 mg/dL Final     BUN   Date Value Ref Range Status   02/22/2023 14 6 - 20 mg/dL Final     Creatinine   Date Value Ref Range Status   02/22/2023 1.08 0.76 - 1.27 mg/dL Final     Sodium   Date Value Ref Range Status   02/22/2023 136 136 - 145 mmol/L Final     Potassium   Date Value Ref Range Status   02/22/2023 4.4 3.5 - 5.2 mmol/L Final     Chloride   Date Value Ref Range Status   02/22/2023 98 98 - 107 mmol/L Final     CO2   Date Value Ref Range Status   02/22/2023 26.2 22.0 - 29.0 mmol/L Final     Calcium   Date Value Ref Range Status   02/22/2023 9.4 8.6 - 10.5 mg/dL Final     BUN/Creatinine Ratio   Date Value Ref Range Status   02/22/2023 13.0 7.0 - 25.0 Final     Anion Gap   Date Value Ref Range Status   02/22/2023 11.8 5.0 - 15.0 mmol/L Final     eGFR   Date Value Ref Range Status   02/22/2023 89.5 >60.0 mL/min/1.73 Final     LIPIDS:  Total Cholesterol   Date Value Ref Range Status   02/15/2023 146 0 - 200 mg/dL Final     Triglycerides   Date Value Ref Range Status   02/15/2023 71 0 - 150 mg/dL Final     HDL Cholesterol   Date Value Ref Range Status   02/15/2023 47 40 - 60 mg/dL Final     LDL Cholesterol    Date Value Ref Range Status   02/15/2023 85 0 - 100 mg/dL Final     VLDL Cholesterol   Date Value Ref Range Status   02/15/2023 14 5 - 40 mg/dL Final     LDL/HDL Ratio   Date Value Ref Range Status   02/15/2023 1.80  Final         Procedures             ASSESSMENT:  Encounter Diagnoses   Name Primary?   • Nonrheumatic mitral valve regurgitation Yes   • Nonrheumatic aortic valve insufficiency          PLAN:    1.  Patient had repair of his mitral valve 2/17/2023.  We will get an echocardiogram to have a baseline.  2.  Patient has what appears to be bicuspid aortic valve with at least mild  to moderate aortic insufficiency.  We will reassess this with the echocardiogram.  3.  We talked about slowly increasing his physical activity and keeping an eye on his chest wall.  Told him not to lift low Rappe very high weights.  4.  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  5.  Patient had an chest x-ray done 3/24/2023 that showed a very small continued right pleural effusion.  I do think this will slowly reabsorb by itself.  6.  When patient returns we will discuss his atenolol.  7.  Patient did get a diagnosis of Marfan's syndrome.  We may have to also consider in the future a noncontrast CT of the chest.          Patient was given instructions and counseling regarding his condition or for health maintenance advice. Please see specific information pulled into the AVS if appropriate.         Eliazar Phipps MD   3/27/2023  16:52 EDT

## 2023-04-11 ENCOUNTER — TRANSCRIBE ORDERS (OUTPATIENT)
Dept: ADMINISTRATIVE | Facility: HOSPITAL | Age: 38
End: 2023-04-11
Payer: MEDICAID

## 2023-04-11 ENCOUNTER — LAB (OUTPATIENT)
Dept: LAB | Facility: HOSPITAL | Age: 38
End: 2023-04-11
Payer: MEDICAID

## 2023-04-11 DIAGNOSIS — Z31.41 FERTILITY TESTING: Primary | ICD-10-CM

## 2023-04-11 DIAGNOSIS — Z31.41 FERTILITY TESTING: ICD-10-CM

## 2023-04-11 LAB
CHARACTER SMN: ABNORMAL
COLLECTION METHOD SMN: ABNORMAL
COLOR SMN: ABNORMAL
COMPLETE EJACULATE: YES
MOTILITY: ABNORMAL
PH SMN: 8 [PH] (ref 7–8.5)
SEX ABSTIN DURATION TIME PATIENT: 5 DAYS
SPECIMEN VOL SMN: 5 ML (ref 1.5–5)
SPERM COUNT: 22 MILLIONS/ML (ref 60–200)
SPERM MORPHOLOGY: ABNORMAL
VISC SMN: NORMAL CP

## 2023-04-11 PROCEDURE — 89320 SEMEN ANAL VOL/COUNT/MOT: CPT

## 2023-04-24 RX ORDER — MONTELUKAST SODIUM 10 MG/1
TABLET ORAL
Qty: 30 TABLET | Refills: 1 | OUTPATIENT
Start: 2023-04-24

## 2023-04-24 RX ORDER — ATENOLOL 25 MG/1
TABLET ORAL
Qty: 30 TABLET | Refills: 1 | OUTPATIENT
Start: 2023-04-24

## 2023-04-24 RX ORDER — ASPIRIN 81 MG/1
TABLET, COATED ORAL
Qty: 30 TABLET | Refills: 1 | OUTPATIENT
Start: 2023-04-24

## 2023-05-02 ENCOUNTER — APPOINTMENT (OUTPATIENT)
Dept: GENERAL RADIOLOGY | Facility: HOSPITAL | Age: 38
End: 2023-05-02
Payer: MEDICAID

## 2023-05-02 VITALS
TEMPERATURE: 98.3 F | OXYGEN SATURATION: 99 % | RESPIRATION RATE: 18 BRPM | WEIGHT: 176.59 LBS | DIASTOLIC BLOOD PRESSURE: 79 MMHG | SYSTOLIC BLOOD PRESSURE: 108 MMHG | HEIGHT: 78 IN | BODY MASS INDEX: 20.43 KG/M2 | HEART RATE: 85 BPM

## 2023-05-02 LAB
BASOPHILS # BLD AUTO: 0.11 10*3/MM3 (ref 0–0.2)
BASOPHILS NFR BLD AUTO: 1 % (ref 0–1.5)
DEPRECATED RDW RBC AUTO: 39.2 FL (ref 37–54)
EOSINOPHIL # BLD AUTO: 0.98 10*3/MM3 (ref 0–0.4)
EOSINOPHIL NFR BLD AUTO: 8.9 % (ref 0.3–6.2)
ERYTHROCYTE [DISTWIDTH] IN BLOOD BY AUTOMATED COUNT: 13.2 % (ref 12.3–15.4)
HCT VFR BLD AUTO: 41.9 % (ref 37.5–51)
HGB BLD-MCNC: 13.8 G/DL (ref 13–17.7)
HOLD SPECIMEN: NORMAL
HOLD SPECIMEN: NORMAL
IMM GRANULOCYTES # BLD AUTO: 0.03 10*3/MM3 (ref 0–0.05)
IMM GRANULOCYTES NFR BLD AUTO: 0.3 % (ref 0–0.5)
LYMPHOCYTES # BLD AUTO: 1.55 10*3/MM3 (ref 0.7–3.1)
LYMPHOCYTES NFR BLD AUTO: 14.1 % (ref 19.6–45.3)
MCH RBC QN AUTO: 26.7 PG (ref 26.6–33)
MCHC RBC AUTO-ENTMCNC: 32.9 G/DL (ref 31.5–35.7)
MCV RBC AUTO: 81 FL (ref 79–97)
MONOCYTES # BLD AUTO: 0.8 10*3/MM3 (ref 0.1–0.9)
MONOCYTES NFR BLD AUTO: 7.3 % (ref 5–12)
NEUTROPHILS NFR BLD AUTO: 68.4 % (ref 42.7–76)
NEUTROPHILS NFR BLD AUTO: 7.53 10*3/MM3 (ref 1.7–7)
NRBC BLD AUTO-RTO: 0 /100 WBC (ref 0–0.2)
PLATELET # BLD AUTO: 180 10*3/MM3 (ref 140–450)
PMV BLD AUTO: 13 FL (ref 6–12)
QT INTERVAL: 372 MS
RBC # BLD AUTO: 5.17 10*6/MM3 (ref 4.14–5.8)
WBC NRBC COR # BLD: 11 10*3/MM3 (ref 3.4–10.8)
WHOLE BLOOD HOLD COAG: NORMAL
WHOLE BLOOD HOLD SPECIMEN: NORMAL

## 2023-05-02 PROCEDURE — 83880 ASSAY OF NATRIURETIC PEPTIDE: CPT | Performed by: EMERGENCY MEDICINE

## 2023-05-02 PROCEDURE — 84484 ASSAY OF TROPONIN QUANT: CPT

## 2023-05-02 PROCEDURE — 87040 BLOOD CULTURE FOR BACTERIA: CPT | Performed by: EMERGENCY MEDICINE

## 2023-05-02 PROCEDURE — 87880 STREP A ASSAY W/OPTIC: CPT | Performed by: EMERGENCY MEDICINE

## 2023-05-02 PROCEDURE — 83735 ASSAY OF MAGNESIUM: CPT

## 2023-05-02 PROCEDURE — 80053 COMPREHEN METABOLIC PANEL: CPT

## 2023-05-02 PROCEDURE — 87081 CULTURE SCREEN ONLY: CPT | Performed by: EMERGENCY MEDICINE

## 2023-05-02 PROCEDURE — U0004 COV-19 TEST NON-CDC HGH THRU: HCPCS | Performed by: EMERGENCY MEDICINE

## 2023-05-02 PROCEDURE — 93005 ELECTROCARDIOGRAM TRACING: CPT | Performed by: EMERGENCY MEDICINE

## 2023-05-02 PROCEDURE — 87804 INFLUENZA ASSAY W/OPTIC: CPT | Performed by: EMERGENCY MEDICINE

## 2023-05-02 PROCEDURE — 99283 EMERGENCY DEPT VISIT LOW MDM: CPT

## 2023-05-02 PROCEDURE — C9803 HOPD COVID-19 SPEC COLLECT: HCPCS | Performed by: EMERGENCY MEDICINE

## 2023-05-02 PROCEDURE — 85025 COMPLETE CBC W/AUTO DIFF WBC: CPT

## 2023-05-02 PROCEDURE — 36415 COLL VENOUS BLD VENIPUNCTURE: CPT

## 2023-05-02 PROCEDURE — 93005 ELECTROCARDIOGRAM TRACING: CPT

## 2023-05-02 PROCEDURE — 71045 X-RAY EXAM CHEST 1 VIEW: CPT

## 2023-05-02 PROCEDURE — 83690 ASSAY OF LIPASE: CPT

## 2023-05-02 PROCEDURE — 83605 ASSAY OF LACTIC ACID: CPT | Performed by: EMERGENCY MEDICINE

## 2023-05-02 RX ORDER — SODIUM CHLORIDE 0.9 % (FLUSH) 0.9 %
10 SYRINGE (ML) INJECTION AS NEEDED
Status: DISCONTINUED | OUTPATIENT
Start: 2023-05-02 | End: 2023-05-03 | Stop reason: HOSPADM

## 2023-05-02 RX ORDER — ASPIRIN 81 MG/1
324 TABLET, CHEWABLE ORAL ONCE
Status: DISCONTINUED | OUTPATIENT
Start: 2023-05-02 | End: 2023-05-03 | Stop reason: HOSPADM

## 2023-05-03 ENCOUNTER — APPOINTMENT (OUTPATIENT)
Dept: CT IMAGING | Facility: HOSPITAL | Age: 38
End: 2023-05-03
Payer: MEDICAID

## 2023-05-03 ENCOUNTER — HOSPITAL ENCOUNTER (EMERGENCY)
Facility: HOSPITAL | Age: 38
Discharge: HOME | End: 2023-05-03
Attending: EMERGENCY MEDICINE
Payer: MEDICAID

## 2023-05-03 DIAGNOSIS — J90 PLEURAL EFFUSION: Primary | ICD-10-CM

## 2023-05-03 LAB
ALBUMIN SERPL-MCNC: 4.2 G/DL (ref 3.5–5.2)
ALBUMIN/GLOB SERPL: 1.2 G/DL
ALP SERPL-CCNC: 108 U/L (ref 39–117)
ALT SERPL W P-5'-P-CCNC: 9 U/L (ref 1–41)
ANION GAP SERPL CALCULATED.3IONS-SCNC: 11.9 MMOL/L (ref 5–15)
AST SERPL-CCNC: 13 U/L (ref 1–40)
BILIRUB SERPL-MCNC: 0.3 MG/DL (ref 0–1.2)
BILIRUB UR QL STRIP: NEGATIVE
BUN SERPL-MCNC: 11 MG/DL (ref 6–20)
BUN/CREAT SERPL: 12.9 (ref 7–25)
CALCIUM SPEC-SCNC: 9.4 MG/DL (ref 8.6–10.5)
CHLORIDE SERPL-SCNC: 103 MMOL/L (ref 98–107)
CLARITY UR: CLEAR
CO2 SERPL-SCNC: 22.1 MMOL/L (ref 22–29)
COLOR UR: YELLOW
CREAT SERPL-MCNC: 0.85 MG/DL (ref 0.76–1.27)
D-LACTATE SERPL-SCNC: 0.6 MMOL/L (ref 0.5–2)
EGFRCR SERPLBLD CKD-EPI 2021: 113.4 ML/MIN/1.73
FLUAV AG NPH QL: NEGATIVE
FLUBV AG NPH QL IA: NEGATIVE
GEN 5 2HR TROPONIN T REFLEX: 10 NG/L
GLOBULIN UR ELPH-MCNC: 3.5 GM/DL
GLUCOSE SERPL-MCNC: 110 MG/DL (ref 65–99)
GLUCOSE UR STRIP-MCNC: NEGATIVE MG/DL
HGB UR QL STRIP.AUTO: NEGATIVE
KETONES UR QL STRIP: NEGATIVE
LEUKOCYTE ESTERASE UR QL STRIP.AUTO: NEGATIVE
LIPASE SERPL-CCNC: 26 U/L (ref 13–60)
MAGNESIUM SERPL-MCNC: 1.8 MG/DL (ref 1.6–2.6)
NITRITE UR QL STRIP: NEGATIVE
NT-PROBNP SERPL-MCNC: 188.7 PG/ML (ref 0–450)
PH UR STRIP.AUTO: 6.5 [PH] (ref 5–8)
POTASSIUM SERPL-SCNC: 4.2 MMOL/L (ref 3.5–5.2)
PROT SERPL-MCNC: 7.7 G/DL (ref 6–8.5)
PROT UR QL STRIP: NEGATIVE
QT INTERVAL: 389 MS
S PYO AG THROAT QL: NEGATIVE
SARS-COV-2 RNA RESP QL NAA+PROBE: NOT DETECTED
SODIUM SERPL-SCNC: 137 MMOL/L (ref 136–145)
SP GR UR STRIP: 1.01 (ref 1–1.03)
TROPONIN T DELTA: 1 NG/L
TROPONIN T SERPL HS-MCNC: 9 NG/L
UROBILINOGEN UR QL STRIP: NORMAL

## 2023-05-03 PROCEDURE — 93005 ELECTROCARDIOGRAM TRACING: CPT | Performed by: EMERGENCY MEDICINE

## 2023-05-03 PROCEDURE — 71260 CT THORAX DX C+: CPT

## 2023-05-03 PROCEDURE — 84484 ASSAY OF TROPONIN QUANT: CPT | Performed by: EMERGENCY MEDICINE

## 2023-05-03 PROCEDURE — 87040 BLOOD CULTURE FOR BACTERIA: CPT | Performed by: EMERGENCY MEDICINE

## 2023-05-03 PROCEDURE — 81003 URINALYSIS AUTO W/O SCOPE: CPT | Performed by: EMERGENCY MEDICINE

## 2023-05-03 PROCEDURE — 25510000001 IOPAMIDOL PER 1 ML: Performed by: EMERGENCY MEDICINE

## 2023-05-03 RX ADMIN — IOPAMIDOL 100 ML: 755 INJECTION, SOLUTION INTRAVENOUS at 01:36

## 2023-05-03 NOTE — Clinical Note
[facility] EMERGENCY ROOM  [address]  [place] [address]  Phone: [phone]    Regino Fox was seen and treated in our emergency department on 5/2/2023.  He may return to work on 05/04/2023.         Thank you for choosing [facility].    Paige Pierson MD

## 2023-05-03 NOTE — ED PROVIDER NOTES
Time: 12:00 AM EDT  Date of encounter:  5/2/2023  Independent Historian/Clinical History and Information was obtained by:   Patient  Chief Complaint   Patient presents with   • Shortness of breath       History is limited by: N/A    History of Present Illness:  Patient is a 37 y.o. year old male who presents to the emergency department for evaluation of Shortness of breath.  Patient states he feels as if he has fluid on his lungs.  Patient had open heart surgery on February 17 for valve replacement.  He denies any history of aneurysms.  He states after surgery he had pleural effusion that was treated with Lasix which he states never really resolved.  Over the last few days he started having worsening shortness of breath especially when he is lying down flat.  He also has pain over his right lower chest especially with deep inspiration.  He called his cardiothoracic surgeon who recommend he follows up with his primary physician.  He saw his primary physician and had an outpatient x-ray but due to worsening symptoms he came in for further evaluation.  He also reports blood pressure was elevated tonight and it was 146/92.    HPI    Patient Care Team  Primary Care Provider: Veronica Chu APRN    Past Medical History:     No Known Allergies  Past Medical History:   Diagnosis Date   • Asthma    • Bicuspid aortic valve    • Broken ankle, left, sequela     HX   • CHF (congestive heart failure)    • Congenital heart disease    • Dyspnea on exertion    • Heart murmur    • Heart valve disease    • History of migraine    • Hypertension     QUIT TAKING MEDS AGE 19 DUE TO INSURANCE REASONS   • Marfan syndrome    • Mitral valve prolapse    • Scoliosis    • TIA (transient ischemic attack)     HX     Past Surgical History:   Procedure Laterality Date   • FEMUR SURGERY Right     X2 FOR FRACTURE AGE 11   • LYMPH NODE DISSECTION      NECK   • MITRAL VALVE REPAIR/REPLACEMENT N/A 2/17/2023    Procedure: JEFFERSON STERNOTOMY MITRAL VALVE  REPAIR AND PRP;  Surgeon: Matthew Gann MD;  Location:  [facility];  Service: Cardiothoracic;  Laterality: N/A;   • TRANSESOPHAGEAL ECHOCARDIOGRAM (JEFFERSON) N/A 08/11/2022    Procedure: TRANSESOPHAGEAL ECHOCARDIOGRAM;  Surgeon: ADRIANNA Bernard MD;  Location:  [facility];  Service: Cardiology;  Laterality: N/A;     Family History   Problem Relation Age of Onset   • Aortic aneurysm Mother    • Heart attack Mother    • No Known Problems Father    • Malig Hyperthermia Neg Hx        Home Medications:  Prior to Admission medications    Medication Sig Start Date End Date Taking? Authorizing Provider   aspirin 81 MG EC tablet Take 1 tablet by mouth Daily for 90 days. 2/22/23 5/23/23  Heather Alvarez APRN   atenolol (TENORMIN) 25 MG tablet Take 1 tablet by mouth Daily for 90 days. 2/21/23 5/22/23  Heather Alvarez APRN   folic acid (FOLVITE) 1 MG tablet Take 1 tablet by mouth Daily for 90 days. 2/21/23 5/22/23  Heather Alvarez APRN   montelukast (SINGULAIR) 10 MG tablet Take 1 tablet by mouth Every Night for 90 days. 2/21/23 5/22/23  Heather Alvarez APRN   traMADol (Ultram) 50 MG tablet Take 1 tablet by mouth Every 6 (Six) Hours As Needed for Moderate Pain. 3/9/23   Heather Alvarez APRN        Social History:   Social History     Tobacco Use   • Smoking status: Former     Types: Cigarettes     Quit date: 2020     Years since quitting: 3.3   • Smokeless tobacco: Never   • Tobacco comments:     MORE OF SOCIAL SMOKER FOR 8 YEARS   Vaping Use   • Vaping Use: Former   • Substances: Nicotine, Flavoring   • Devices: Disposable   Substance Use Topics   • Alcohol use: Yes     Comment: rarely   • Drug use: Never         Review of Systems:  Review of Systems   Constitutional: Negative for chills and fever.   HENT: Negative for congestion, ear pain and sore throat.    Eyes: Negative for pain.   Respiratory: Positive for cough and shortness of breath. Negative for chest tightness.    Cardiovascular: Positive for chest pain.  "  Gastrointestinal: Negative for abdominal pain, diarrhea, nausea and vomiting.   Genitourinary: Negative for flank pain and hematuria.   Musculoskeletal: Negative for joint swelling and myalgias.   Skin: Negative for pallor.   Neurological: Negative for dizziness and seizures.   All other systems reviewed and are negative.       Physical Exam:  /79 (BP Location: Left arm, Patient Position: Sitting)   Pulse 85   Temp 98.3 °F (36.8 °C) (Oral)   Resp 18   Ht 198.1 cm (78\")   Wt 80.1 kg (176 lb 9.4 oz)   SpO2 99%   BMI 20.41 kg/m²     Physical Exam  Constitutional:       General: He is not in acute distress.     Appearance: Normal appearance. He is well-developed. He is not toxic-appearing.   HENT:      Head: Normocephalic and atraumatic.      Nose: Nose normal.      Mouth/Throat:      Mouth: Mucous membranes are moist.   Eyes:      Extraocular Movements: Extraocular movements intact.      Conjunctiva/sclera: Conjunctivae normal.      Pupils: Pupils are equal, round, and reactive to light.   Cardiovascular:      Rate and Rhythm: Normal rate and regular rhythm.      Pulses: Normal pulses.      Heart sounds: Normal heart sounds.   Pulmonary:      Effort: Pulmonary effort is normal. No respiratory distress.      Breath sounds: Examination of the right-lower field reveals decreased breath sounds. Decreased breath sounds present.   Abdominal:      General: There is no distension.      Palpations: Abdomen is soft.      Tenderness: There is no abdominal tenderness.   Musculoskeletal:         General: Normal range of motion.      Cervical back: Normal range of motion.   Skin:     General: Skin is warm and dry.      Capillary Refill: Capillary refill takes less than 2 seconds.   Neurological:      General: No focal deficit present.      Mental Status: He is alert and oriented to person, place, and time. Mental status is at baseline.   Psychiatric:         Mood and Affect: Mood normal.         Behavior: Behavior " normal.                  Procedures:  Procedures      Medical Decision Making:      Comorbidities that affect care:    Marfan syndrome, Congenital heart disease, HTN, Congestive Heart Failure    External Notes reviewed:    Previous Clinic Note: Patient was seen on 3/27/2023 by cardiology for follow-up for valvular heart disease      The following orders were placed and all results were independently analyzed by me:  Orders Placed This Encounter   Procedures   • Influenza Antigen, Rapid - Swab, Nasopharynx   • Rapid Strep A Screen - Swab, Throat   • COVID-19,APTIMA PANTHER([facility]),[facility], NP/OP SWAB IN UTM/VTM/[facility] TRANSPORT MEDIA,24 HR TAT - Swab, Nasopharynx   • Blood Culture - Blood,   • Blood Culture - Blood,   • Beta Strep Culture, Throat - Swab, Throat   • XR Chest 1 View   • CT Chest With Contrast Diagnostic   • [place] Draw   • High Sensitivity Troponin T   • Comprehensive Metabolic Panel   • Lipase   • BNP   • Magnesium   • CBC Auto Differential   • Urinalysis With Microscopic If Indicated (No Culture) - Urine, Clean Catch   • Lactic Acid, Plasma   • High Sensitivity Troponin T 2Hr   • Ambulatory Referral to Pulmonology   • ECG 12 Lead ED Triage Standing Order; Chest Pain   • CBC & Differential   • Green Top (Gel)   • Lavender Top   • Gold Top - SST   • Light Blue Top       Medications Given in the Emergency Department:  Medications   iopamidol (ISOVUE-370) 76 % injection 100 mL (100 mL Intravenous Given 5/3/23 0136)        ED Course:    The patient was initially evaluated in the triage area where orders were placed. The patient was later dispositioned by Paige Pierson MD.      The patient was advised to stay for completion of workup which includes but is not limited to communication of labs and radiological results, reassessment and plan. The patient was advised that leaving prior to disposition by a provider could result in critical findings that are not communicated to the patient.     ED  Course as of 05/03/23 1517   Wed May 03, 2023   1516 ECG 12 Lead ED Triage Standing Order; Chest Pain  Sinus rhythm with rate of 83.  No acute ST elevation.  RSR pattern.  Normal CT and QTc interval.  EKG interpreted by me.  No significant change when compared to previous. [LD]   1517 ECG 12 Lead ED Triage Standing Order; Chest Pain  Sinus rhythm with rate of 78.  No acute ST elevation.  RSR pattern.  Normal CT and QTc interval.  EKG interpreted by me.  No significant change when compared to previous. [LD]      ED Course User Index  [LD] Paige Pierson MD       Labs:    Lab Results (last 24 hours)     Procedure Component Value Units Date/Time    Influenza Antigen, Rapid - Swab, Nasopharynx [[id number]]  (Normal) Collected: 05/02/23 2333    Specimen: Swab from Nasopharynx Updated: 05/03/23 0018     Influenza A Ag, EIA Negative     Influenza B Ag, EIA Negative    Rapid Strep A Screen - Swab, Throat [[id number]]  (Normal) Collected: 05/02/23 2333    Specimen: Swab from Throat Updated: 05/03/23 0001     Strep A Ag Negative    COVID-19,[facility]TIMA PANTHER(EUNICE),[facility], NP/OP SWAB IN UTM/VTM/SALINE TRANSPORT MEDIA,24 HR TAT - Swab, Nasopharynx [[id number]]  (Normal) Collected: 05/02/23 2333    Specimen: Swab from Nasopharynx Updated: 05/03/23 0636     COVID19 Not Detected    Narrative:      Fact sheet for providers: https://www.fda.gov/media/081394/download     Fact sheet for patients: https://www.fda.gov/media/656670/download    Test performed by RT PCR.    Beta Strep Culture, Throat - Swab, Throat [[id number]]  (Normal) Collected: 05/02/23 2333    Specimen: Swab from Throat Updated: 05/03/23 1037     Throat Culture, Beta Strep No Beta Hemolytic Streptococcus Isolated    Narrative:      Group A Strep incidence is low in adults. Positive culture for Beta hemolytic Streptococcus species can reflect colonization and not true infection. Please correlate clinically.    High Sensitivity Troponin T [[id number]]  (Normal)  Collected: 05/02/23 2343    Specimen: Blood Updated: 05/03/23 0014     HS Troponin T 9 ng/L     Narrative:      High Sensitive Troponin T Reference Range:  <10.0 ng/L- Negative Female for AMI  <15.0 ng/L- Negative Male for AMI  >=10 - Abnormal Female indicating possible myocardial injury.  >=15 - Abnormal Male indicating possible myocardial injury.   Clinicians would have to utilize clinical acumen, EKG, Troponin, and serial changes to determine if it is an Acute Myocardial Infarction or myocardial injury due to an underlying chronic condition.         CBC & Differential [[id number]]  (Abnormal) Collected: 05/02/23 2343    Specimen: Blood Updated: 05/02/23 2357    Narrative:      The following orders were created for panel order CBC & Differential.  Procedure                               Abnormality         Status                     ---------                               -----------         ------                     CBC Auto Differential[[id number]]        Abnormal            Final result               Scan Slide[[id number]]                                                                    Please view results for these tests on the individual orders.    Comprehensive Metabolic Panel [[id number]]  (Abnormal) Collected: 05/02/23 2343    Specimen: Blood Updated: 05/03/23 0014     Glucose 110 mg/dL      BUN 11 mg/dL      Creatinine 0.85 mg/dL      Sodium 137 mmol/L      Potassium 4.2 mmol/L      Chloride 103 mmol/L      CO2 22.1 mmol/L      Calcium 9.4 mg/dL      Total Protein 7.7 g/dL      Albumin 4.2 g/dL      ALT (SGPT) 9 U/L      AST (SGOT) 13 U/L      Alkaline Phosphatase 108 U/L      Total Bilirubin 0.3 mg/dL      Globulin 3.5 gm/dL      A/G Ratio 1.2 g/dL      BUN/Creatinine Ratio 12.9     Anion Gap 11.9 mmol/L      eGFR 113.4 mL/min/1.73     Narrative:      GFR Normal >60  Chronic Kidney Disease <60  Kidney Failure <15      Lipase [[id number]]  (Normal) Collected: 05/02/23 2343    Specimen: Blood Updated:  05/03/23 0014     Lipase 26 U/L     BNP [[id number]]  (Normal) Collected: 05/02/23 2343    Specimen: Blood Updated: 05/03/23 0136     proBNP 188.7 pg/mL     Narrative:      Among patients with dyspnea, NT-proBNP is highly sensitive for the detection of acute congestive heart failure. In addition NT-proBNP of <300 pg/ml effectively rules out acute congestive heart failure with 99% negative predictive value.    Results may be falsely decreased if patient taking Biotin.      Magnesium [[id number]]  (Normal) Collected: 05/02/23 2343    Specimen: Blood Updated: 05/03/23 0014     Magnesium 1.8 mg/dL     CBC Auto Differential [[id number]]  (Abnormal) Collected: 05/02/23 2343    Specimen: Blood Updated: 05/02/23 2357     WBC 11.00 10*3/mm3      RBC 5.17 10*6/mm3      Hemoglobin 13.8 g/dL      Hematocrit 41.9 %      MCV 81.0 fL      MCH 26.7 pg      MCHC 32.9 g/dL      RDW 13.2 %      RDW-SD 39.2 fl      MPV 13.0 fL      Platelets 180 10*3/mm3      Neutrophil % 68.4 %      Lymphocyte % 14.1 %      Monocyte % 7.3 %      Eosinophil % 8.9 %      Basophil % 1.0 %      Immature Grans % 0.3 %      Neutrophils, Absolute 7.53 10*3/mm3      Lymphocytes, Absolute 1.55 10*3/mm3      Monocytes, Absolute 0.80 10*3/mm3      Eosinophils, Absolute 0.98 10*3/mm3      Basophils, Absolute 0.11 10*3/mm3      Immature Grans, Absolute 0.03 10*3/mm3      nRBC 0.0 /100 WBC     Blood Culture - Blood, Arm, Left [[id number]] Collected: 05/02/23 2343    Specimen: Blood from Arm, Left Updated: 05/02/23 2347    Lactic Acid, Plasma [[id number]]  (Normal) Collected: 05/02/23 2343    Specimen: Blood Updated: 05/03/23 0012     Lactate 0.6 mmol/L     Blood Culture - Blood, Arm, Left [[id number]] Collected: 05/03/23 0031    Specimen: Blood from Arm, Left Updated: 05/03/23 0037    Urinalysis With Microscopic If Indicated (No Culture) - Urine, Clean Catch [[id number]]  (Normal) Collected: 05/03/23 0032    Specimen: Urine, Clean Catch Updated: 05/03/23 0041     Color,  UA Yellow     Appearance, UA Clear     pH, UA 6.5     Specific Gravity, UA 1.012     Glucose, UA Negative     Ketones, UA Negative     Bilirubin, UA Negative     Blood, UA Negative     Protein, UA Negative     Leuk Esterase, UA Negative     Nitrite, UA Negative     Urobilinogen, UA 1.0 E.U./dL    Narrative:      Urine microscopic not indicated.    High Sensitivity Troponin T 2Hr [[id number]]  (Normal) Collected: 05/03/23 0211    Specimen: Blood Updated: 05/03/23 0235     HS Troponin T 10 ng/L      Troponin T Delta 1 ng/L     Narrative:      High Sensitive Troponin T Reference Range:  <10.0 ng/L- Negative Female for AMI  <15.0 ng/L- Negative Male for AMI  >=10 - Abnormal Female indicating possible myocardial injury.  >=15 - Abnormal Male indicating possible myocardial injury.   Clinicians would have to utilize clinical acumen, EKG, Troponin, and serial changes to determine if it is an Acute Myocardial Infarction or myocardial injury due to an underlying chronic condition.                Imaging:    CT Chest With Contrast Diagnostic    Result Date: 5/3/2023  PROCEDURE: CT CHEST W CONTRAST DIAGNOSTIC  COMPARISONS: 5/2/2023; 8/16/2022.  INDICATIONS: DYSPNEA; MID CHEST PAIN X 2 DAYS; POST HEART VALVE REPLACEMENT (ON 2-).  TECHNIQUE: After obtaining the patient's consent, 934 CT/CTA images were obtained with non-ionic intravenous contrast material.  There is slight motion artifact on the study.  PROTOCOL:   Pulmonary embolism CTA imaging protocol performed.    RADIATION:   Total DLP: 359.8 mGy*cm.   Automated exposure control was utilized to minimize radiation dose. CONTRAST: 75 mL Isovue 370 I.V.  FINDINGS:  LUNGS: No acute infiltrate.  No suspicious pulmonary nodules.  There may be mild atelectasis in the lung bases, especially in the right lower lobe. VASCULATURE: No pulmonary embolism.  No coronary artery calcifications.  RONI: No enlarged hilar lymph nodes.  MEDIASTINUM: No enlarged mediastinal lymph nodes.   The patient has undergone median sternotomy.  There is a mitral valve replacement.  There may be residual thymic tissue, which is probably within normal limits for the patient's age. CARDIAC: No cardiac enlargement.  A trace amount of pericardial effusion is suggested. AORTA: No thoracic aortic aneurysm or dissection.  PLEURA: There is a small to moderate right pleural effusion.  There is minimal, if any, left pleural effusion.  No pneumothorax. CHEST WALL: No mass or axillary adenopathy.  No subcutaneous emphysema.  No focal fluid collection.  LIMITED ABDOMEN: No acute findings are seen in the partially imaged upper abdomen.  BONES: No acute fracture.  No aggressive osseous lesion.  There are mild-to-moderate degenerative changes throughout the imaged spine, seen previously. OTHER: The central tracheobronchial tree is well aerated without filling defect.        No pulmonary embolism (PE).  There is a small-to-moderate-sized right pleural effusion.  The study is motion-limited.  Interval postoperative changes of the mediastinum are noted, as detailed above.   Please note that portions of this note were completed with a voice recognition program.  SIMEON MONTALVO JR, MD       Electronically Signed and Approved By: SIMEON MONTALVO JR, MD on 5/03/2023 at 2:46             XR Chest 1 View    Result Date: 5/3/2023  PROCEDURE: XR CHEST 1 VW  COMPARISON: 3/24/2023.  INDICATIONS: CHEST PAIN; UNSPECIFIED WEAKNESS; FATIGUE.  FINDINGS: 2 PA upright portable views of the chest are provided for review.  There is a small right pleural effusion.  No definite left pleural effusion.  No acute infiltrate.  The patient has undergone median sternotomy and mitral valve replacement/repair (with a prosthesis in place).  Please correlate clinically.  No pneumothorax is seen.  Mild biapical pleural parenchymal scarring may be present.  There may be minimal atelectasis and/or fibrosis in the right lung base.       No acute infiltrate is seen.   A small right pleural effusion is identified.      Please note that portions of this note were completed with a voice recognition program.  SIMEON MONTALVO JR, MD       Electronically Signed and Approved By: SIMEON MONTALVO JR, MD on 5/03/2023 at 0:54                  Differential Diagnosis and Discussion:      Dyspnea: Differential diagnosis includes but is not limited to metabolic acidosis, neurological disorders, psychogenic, asthma, pneumothorax, upper airway obstruction, COPD, pneumonia, noncardiogenic pulmonary edema, interstitial lung disease, anemia, congestive heart failure, and pulmonary embolism    All labs were reviewed and interpreted by me.  All X-rays impressions were independently interpreted by me.  EKG was interpreted by me.  CT scan radiology impression was interpreted by me.    MDM  Number of Diagnoses or Management Options  Pleural effusion  Diagnosis management comments: Patient is afebrile nontoxic-appearing.  Vital signs are stable.  EKG showed sinus rhythm no acute ST elevation.  Troponin was negative.  Flu and COVID were both negative.  BNP is not elevated.  Chest x-ray showed small right pleural effusion.  CT was subsequently obtained that showed a small to moderate-sized right pleural effusion.  Patient's O2 sat remained upper 90s on room air.  At this time believe patient is stable for discharge but recommend follow-up with pulmonology.  Will place referral for pulmonology for close follow-up to discuss possible treatment options for pleural effusion.  Discussed plan with patient.  He is comfortable with plan.  Discussed return precautions, discharge instructions and answered all his questions.           Amount and/or Complexity of Data Reviewed  Clinical lab tests: reviewed  Tests in the radiology section of CPT®: reviewed  Review and summarize past medical records: yes  Independent visualization of images, tracings, or specimens: yes    Risk of Complications, Morbidity, and/or  Mortality  Presenting problems: moderate  Management options: moderate    Patient Progress  Patient progress: stable           Patient Care Considerations:    CONSULT: I considered consulting pulmonology, however patient is stable for out patient follow up      Consultants/Shared Management Plan:    None    Social Determinants of Health:    Patient is independent, reliable, and has access to care.       Disposition and Care Coordination:    Discharged: The patient is suitable and stable for discharge with no need for consideration of observation or admission.    I have explained the patient´s condition, diagnoses and treatment plan based on the information available to me at this time. I have answered questions and addressed any concerns. The patient has a good  understanding of the patient´s diagnosis, condition, and treatment plan as can be expected at this point. The vital signs have been stable. The patient´s condition is stable and appropriate for discharge from the emergency department.      The patient will pursue further outpatient evaluation with the primary care physician or other designated or consulting physician as outlined in the discharge instructions. They are agreeable to this plan of care and follow-up instructions have been explained in detail. The patient has received these instructions in written format and have expressed an understanding of the discharge instructions. The patient is aware that any significant change in condition or worsening of symptoms should prompt an immediate return to this or the closest emergency department or call to 911.  I have explained discharge medications and the need for follow up with the patient/caretakers. This was also printed in the discharge instructions. Patient was discharged with the following medications and follow up:      Medication List      No changes were made to your prescriptions during this visit.      Lucio Murillo MD  [address] [facility]  [address]  [place] [address]  [phone]    Schedule an appointment as soon as possible for a visit       Veronica Chu, APRN  [address]  [address]  [facility] [address]  [phone]    In 2 days         Final diagnoses:   Pleural effusion        ED Disposition     ED Disposition   Discharge    Condition   Stable    Comment   --             This medical record created using voice recognition software.           Paige Pierson MD  05/03/23 2113

## 2023-05-04 LAB — BACTERIA SPEC AEROBE CULT: NORMAL

## 2023-05-05 ENCOUNTER — HOSPITAL ENCOUNTER (OUTPATIENT)
Dept: CARDIOLOGY | Facility: HOSPITAL | Age: 38
Discharge: HOME | End: 2023-05-05
Payer: MEDICAID

## 2023-05-05 DIAGNOSIS — I35.1 NONRHEUMATIC AORTIC VALVE INSUFFICIENCY: ICD-10-CM

## 2023-05-05 DIAGNOSIS — I34.0 NONRHEUMATIC MITRAL VALVE REGURGITATION: ICD-10-CM

## 2023-05-05 LAB
BH CV ECHO MEAS - AO MAX PG: 4 MMHG
BH CV ECHO MEAS - AO MEAN PG: 2 MMHG
BH CV ECHO MEAS - AO ROOT DIAM: 3.3 CM
BH CV ECHO MEAS - AO V2 MAX: 102 CM/SEC
BH CV ECHO MEAS - AO V2 VTI: 17.5 CM
BH CV ECHO MEAS - AVA(I,D): 4 CM2
BH CV ECHO MEAS - EDV(CUBED): 54.9 ML
BH CV ECHO MEAS - EDV(MOD-SP2): 66.3 ML
BH CV ECHO MEAS - EDV(MOD-SP4): 66.2 ML
BH CV ECHO MEAS - EF(MOD-BP): 66.3 %
BH CV ECHO MEAS - EF(MOD-SP2): 65.3 %
BH CV ECHO MEAS - EF(MOD-SP4): 65.1 %
BH CV ECHO MEAS - ESV(CUBED): 17.6 ML
BH CV ECHO MEAS - ESV(MOD-SP2): 23 ML
BH CV ECHO MEAS - ESV(MOD-SP4): 23.1 ML
BH CV ECHO MEAS - FS: 31.6 %
BH CV ECHO MEAS - IVS/LVPW: 1 CM
BH CV ECHO MEAS - IVSD: 1.3 CM
BH CV ECHO MEAS - LA DIMENSION: 2.6 CM
BH CV ECHO MEAS - LAT PEAK E' VEL: 12.7 CM/SEC
BH CV ECHO MEAS - LV MASS(C)D: 173.1 GRAMS
BH CV ECHO MEAS - LV MAX PG: 2.18 MMHG
BH CV ECHO MEAS - LV MEAN PG: 1 MMHG
BH CV ECHO MEAS - LV V1 MAX: 73.9 CM/SEC
BH CV ECHO MEAS - LV V1 VTI: 13.2 CM
BH CV ECHO MEAS - LVIDD: 3.8 CM
BH CV ECHO MEAS - LVIDS: 2.6 CM
BH CV ECHO MEAS - LVOT AREA: 5.3 CM2
BH CV ECHO MEAS - LVOT DIAM: 2.6 CM
BH CV ECHO MEAS - LVPWD: 1.3 CM
BH CV ECHO MEAS - MED PEAK E' VEL: 8.6 CM/SEC
BH CV ECHO MEAS - MR MAX PG: 96.5 MMHG
BH CV ECHO MEAS - MR MAX VEL: 491 CM/SEC
BH CV ECHO MEAS - MV A MAX VEL: 55.5 CM/SEC
BH CV ECHO MEAS - MV DEC TIME: 0.23 MSEC
BH CV ECHO MEAS - MV E MAX VEL: 78.6 CM/SEC
BH CV ECHO MEAS - MV E/A: 1.42
BH CV ECHO MEAS - RAP SYSTOLE: 10 MMHG
BH CV ECHO MEAS - RVDD: 2.5 CM
BH CV ECHO MEAS - RVSP: 47 MMHG
BH CV ECHO MEAS - SV(LVOT): 70.1 ML
BH CV ECHO MEAS - SV(MOD-SP2): 43.3 ML
BH CV ECHO MEAS - SV(MOD-SP4): 43.1 ML
BH CV ECHO MEAS - TR MAX PG: 36.5 MMHG
BH CV ECHO MEAS - TR MAX VEL: 302 CM/SEC
BH CV ECHO MEASUREMENTS AVERAGE E/E' RATIO: 7.38
IVRT: 71 MSEC
LEFT ATRIUM VOLUME INDEX: 11.4 ML/M2
LEFT ATRIUM VOLUME: 24.4 ML
MAXIMAL PREDICTED HEART RATE: 181 BPM
STRESS TARGET HR: 154 BPM

## 2023-05-05 PROCEDURE — 93306 TTE W/DOPPLER COMPLETE: CPT

## 2023-05-08 ENCOUNTER — OFFICE VISIT (OUTPATIENT)
Dept: PULMONOLOGY | Facility: CLINIC | Age: 38
End: 2023-05-08
Payer: MEDICAID

## 2023-05-08 VITALS
OXYGEN SATURATION: 98 % | WEIGHT: 175 LBS | DIASTOLIC BLOOD PRESSURE: 76 MMHG | RESPIRATION RATE: 16 BRPM | HEIGHT: 78 IN | SYSTOLIC BLOOD PRESSURE: 122 MMHG | HEART RATE: 98 BPM | BODY MASS INDEX: 20.25 KG/M2

## 2023-05-08 DIAGNOSIS — R06.09 DOE (DYSPNEA ON EXERTION): ICD-10-CM

## 2023-05-08 DIAGNOSIS — J90 PLEURAL EFFUSION: Primary | ICD-10-CM

## 2023-05-08 DIAGNOSIS — Q87.40 MARFAN SYNDROME: ICD-10-CM

## 2023-05-08 DIAGNOSIS — R59.0 HILAR ADENOPATHY: ICD-10-CM

## 2023-05-08 LAB
BACTERIA SPEC AEROBE CULT: NORMAL
BACTERIA SPEC AEROBE CULT: NORMAL

## 2023-05-08 NOTE — PROGRESS NOTES
Primary Care Provider  Veronica Chu APRN   Referring Provider  Paige Pierson MD      Patient Complaint  Establish Care and Pleural Effusion      Subjective          Regino Fox presents to [facility] PULMONARY & CRITICAL CARE MEDICINE      History of Presenting Illness  Regino Fox is a 37 y.o. male history of Marfan's disease, mitral valve relapse with recent mitral valve repair via open sternotomy here for evaluation for pleural effusion.  Postoperatively the patient noted of bilateral pleural effusions.  The left side resolved but the right side is slowly worsened.  He has no previous history of pleural effusion prior to surgery.  His NT BNP is normal and denies any leg swelling or PND.  He does have orthopnea and increasing cough with lying flat.  He denies any dyspnea on activity.  He was seen in the ER recently and x-ray showed pleural effusion is enlarging.  Also has stable right hilar adenopathy dating back to last year.  No infiltrates or masses.  He denies any chest pain, hemoptysis, fevers, chills,  or night sweats.  He is a former cigarette smoker and quit smoking 3 years ago.    He is able to perform ADLs without difficulties and denies any swollen glands/lymph nodes in the head or neck.    I have personally reviewed the review of systems, past family, social, medical and surgical histories; and agree with their findings.        Review of Systems  Constitutional symptoms:  Denied complaints   Ear, nose, throat: Denied complaints  Cardiovascular:   Orthopnea, otherwise denied complaints  Respiratory: Dyspnea and cough, otherwise denied complaints  Gastrointestinal: Denied complaints  Musculoskeletal: Denied complaints  Genitourinary: Denied complaints  Allergy / Immunology: Denied complaints  Hematologic: Denied complaints  Neurologic: Denied complaints  Skin: Denied complaints  Endocrine: Denied complaints  Psychiatric: Denied complaints      Family History   Problem  Relation Age of Onset   • Aortic aneurysm Mother    • Heart attack Mother    • No Known Problems Father    • Malig Hyperthermia Neg Hx         Social History     Socioeconomic History   • Marital status: Single   Tobacco Use   • Smoking status: Former     Packs/day: 0.25     Years: 10.00     Pack years: 2.50     Types: Cigarettes     Start date: 2010     Quit date: 2020     Years since quitting: 3.3     Passive exposure: Past   • Smokeless tobacco: Never   • Tobacco comments:     MORE OF SOCIAL SMOKER FOR 8 YEARS     Smoked at most 4 cpd    Vaping Use   • Vaping Use: Former   • Start date: 2/1/2020   • Quit date: 2/1/2023   • Substances: Nicotine, Flavoring   • Devices: Disposable   Substance and Sexual Activity   • Alcohol use: Yes     Comment: rarely   • Drug use: Never   • Sexual activity: Defer        Past Medical History:   Diagnosis Date   • Asthma    • Bicuspid aortic valve    • Broken ankle, left, sequela     HX   • CHF (congestive heart failure)    • Congenital heart disease    • Dyspnea on exertion    • Heart murmur    • Heart valve disease    • History of migraine    • Hypertension     QUIT TAKING MEDS AGE 19 DUE TO INSURANCE REASONS   • Marfan syndrome    • Mitral valve prolapse    • Scoliosis    • TIA (transient ischemic attack)     HX        Immunization History   Administered Date(s) Administered   • Td 08/04/1999         No Known Allergies       Current Outpatient Medications:   •  aspirin 81 MG EC tablet, Take 1 tablet by mouth Daily for 90 days., Disp: 30 tablet, Rfl: 2  •  atenolol (TENORMIN) 25 MG tablet, Take 1 tablet by mouth Daily for 90 days., Disp: 30 tablet, Rfl: 2  •  folic acid (FOLVITE) 1 MG tablet, Take 1 tablet by mouth Daily for 90 days., Disp: 30 tablet, Rfl: 2  •  montelukast (SINGULAIR) 10 MG tablet, Take 1 tablet by mouth Every Night for 90 days., Disp: 30 tablet, Rfl: 2  •  traMADol (Ultram) 50 MG tablet, Take 1 tablet by mouth Every 6 (Six) Hours As Needed for Moderate Pain.  "(Patient not taking: Reported on 5/8/2023), Disp: 21 tablet, Rfl: 0         Objective     Vital Signs:   /76 (BP Location: Left arm, Patient Position: Sitting, Cuff Size: Adult)   Pulse 98   Resp 16   Ht 198.1 cm (78\")   Wt 79.4 kg (175 lb)   SpO2 98% Comment: Room air  BMI 20.22 kg/m²     Physical Exam  Vital Signs Reviewed   WDWN, Alert, NAD.    HEENT:  PERRL, EOMI.  OP, nares clear, no sinus tenderness  Neck:  Supple, no JVD, no thyromegaly  Lymph: no axillary, cervical, supraclavicular lymphadenopathy noted bilaterally  Chest:  good aeration, diminished right base, dull to percussion right base with the day egophony on the right side, no work of breathing noted, sternotomy wound well-healed  CV: RRR, no MGR, pulses 2+, equal.  Abd:  Soft, NT, ND, + BS, no HSM  EXT:  no clubbing, no cyanosis, no edema, no joint tenderness  Neuro:  A&Ox3, CN grossly intact, no focal deficits.  Skin: No rashes or lesions noted       Result Review :   I have personally reviewed the cardiology and office notes.  Multiple chest x-rays reviewed showing a slowly enlarging right pleural effusion.  Hospital records during his mitral valve repair noted.  CT of the abdomen and pelvis at that time personally reviewed showing bilateral pleural effusions.  Most recent chest CT was personally reviewed showing a large right pleural effusion.  Appears to have stable right hilar adenopathy.  No PEs were noted.  CBC and CMP personally reviewed.  Does have 990 peripheral eosinophils.  No evidence of chronic hypercapnic respiratory failure             Assessment and Plan      Patient Active Problem List   Diagnosis   • VHD (valvular heart disease)   • Marfan syndrome   • Severe mitral regurgitation   • Bicuspid aortic valve   • Dyspnea on effort   • Chest pain, atypical   • Mitral valve insufficiency, unspecified etiology       Impression:  Enlarging right pleural effusion  Right hilar adenopathy.  1.5 cm and stable since " 2022  Orthopnea  Acute dyspnea  Marfan syndrome  Mitral valve insufficiency status post mitral valve repair in 2023  Peripheral eosinophilia  Tobacco use of cigarettes in remission  Seasonal allergies, well controlled    Plan:  Patient has a postoperative right pleural effusion that has not resolved.  This can occasionally happen during cardiac surgery via open sternotomy approach.  Could be inflammatory fluid versus residual fluid.  Given high peripheral blood eosinophils, will also need rule out eosinophilic effusion and perform work-up for that.  We will take patient for diagnostic and therapeutic ultrasound-guided right-sided thoracentesis  I have discussed the risks of the procedure with the patient including pneumothorax, hemothorax, bleeding, hypoxia and death. The patient recognizes these findings, acknowledges these findings and is agreeable to the procedure.  Continue Singulair and over-the-counter antihistamines  Recommend repeat noncontrast chest CT in 6 months to follow-up right hilar adenopathy.  Will need 2 years of documented stability.  If at any point enlarges, will proceed with bronchoscopy with endobronchial ultrasound/fine-needle aspiration  Smoking status: Former smoker.  Not eligible for lung cancer screening  Vaccination status: Not eligible for pneumonia vaccine.  Declines influenza and COVID-19 vaccinations  Medications personally reviewed.      Follow Up   Return in about 2 weeks (around 5/22/2023).  Patient was given instructions and counseling regarding his condition or for health maintenance advice. Please see specific information pulled into the AVS if appropriate.     Electronically signed by Lucio Murillo MD, 05/08/23, 1:20 PM EDT.

## 2023-05-10 LAB
QT INTERVAL: 372 MS
QT INTERVAL: 389 MS

## 2023-05-12 ENCOUNTER — HOSPITAL ENCOUNTER (OUTPATIENT)
Dept: INFUSION THERAPY | Facility: HOSPITAL | Age: 38
Discharge: HOME | End: 2023-05-12
Attending: INTERNAL MEDICINE | Admitting: INTERNAL MEDICINE
Payer: MEDICAID

## 2023-05-12 ENCOUNTER — APPOINTMENT (OUTPATIENT)
Dept: GENERAL RADIOLOGY | Facility: HOSPITAL | Age: 38
End: 2023-05-12
Payer: MEDICAID

## 2023-05-12 DIAGNOSIS — R06.09 DOE (DYSPNEA ON EXERTION): ICD-10-CM

## 2023-05-12 DIAGNOSIS — Q87.40 MARFAN SYNDROME: ICD-10-CM

## 2023-05-12 DIAGNOSIS — R59.0 HILAR ADENOPATHY: ICD-10-CM

## 2023-05-12 DIAGNOSIS — J90 PLEURAL EFFUSION: ICD-10-CM

## 2023-05-12 LAB
ALBUMIN FLD-MCNC: 3.3 G/DL
APPEARANCE FLD: ABNORMAL
BASOPHILS NFR FLD: 2 %
COLOR FLD: YELLOW
EOSINOPHIL NFR FLD MANUAL: 19 %
GLUCOSE FLD-MCNC: 91 MG/DL
LDH FLD-CCNC: 419 U/L
LYMPHOCYTES NFR FLD MANUAL: 47 %
MESOTHL CELL NFR FLD MANUAL: 4 %
MONOCYTES NFR FLD: 15 %
NEUTROPHILS NFR FLD MANUAL: 13 %
NIGHT BLUE STAIN TISS: NORMAL
NUC CELL # FLD: 3354 /MM3
PH FLD: 7 [PH]
PROT FLD-MCNC: 5.2 G/DL
RBC # FLD AUTO: 6000 /MM3

## 2023-05-12 PROCEDURE — 83615 LACTATE (LD) (LDH) ENZYME: CPT | Performed by: INTERNAL MEDICINE

## 2023-05-12 PROCEDURE — 83986 ASSAY PH BODY FLUID NOS: CPT | Performed by: INTERNAL MEDICINE

## 2023-05-12 PROCEDURE — 84478 ASSAY OF TRIGLYCERIDES: CPT | Performed by: INTERNAL MEDICINE

## 2023-05-12 PROCEDURE — 84157 ASSAY OF PROTEIN OTHER: CPT | Performed by: INTERNAL MEDICINE

## 2023-05-12 PROCEDURE — 87070 CULTURE OTHR SPECIMN AEROBIC: CPT | Performed by: INTERNAL MEDICINE

## 2023-05-12 PROCEDURE — 32555 ASPIRATE PLEURA W/ IMAGING: CPT

## 2023-05-12 PROCEDURE — 87206 SMEAR FLUORESCENT/ACID STAI: CPT | Performed by: INTERNAL MEDICINE

## 2023-05-12 PROCEDURE — 87116 MYCOBACTERIA CULTURE: CPT | Performed by: INTERNAL MEDICINE

## 2023-05-12 PROCEDURE — 87075 CULTR BACTERIA EXCEPT BLOOD: CPT | Performed by: INTERNAL MEDICINE

## 2023-05-12 PROCEDURE — 71045 X-RAY EXAM CHEST 1 VIEW: CPT

## 2023-05-12 PROCEDURE — 82042 OTHER SOURCE ALBUMIN QUAN EA: CPT | Performed by: INTERNAL MEDICINE

## 2023-05-12 PROCEDURE — 87205 SMEAR GRAM STAIN: CPT | Performed by: INTERNAL MEDICINE

## 2023-05-12 PROCEDURE — 32555 ASPIRATE PLEURA W/ IMAGING: CPT | Performed by: INTERNAL MEDICINE

## 2023-05-12 PROCEDURE — 89051 BODY FLUID CELL COUNT: CPT | Performed by: INTERNAL MEDICINE

## 2023-05-12 PROCEDURE — 84311 SPECTROPHOTOMETRY: CPT | Performed by: INTERNAL MEDICINE

## 2023-05-12 PROCEDURE — 82945 GLUCOSE OTHER FLUID: CPT | Performed by: INTERNAL MEDICINE

## 2023-05-12 PROCEDURE — 87102 FUNGUS ISOLATION CULTURE: CPT | Performed by: INTERNAL MEDICINE

## 2023-05-12 NOTE — PROCEDURES
Bedside thoracic ultrasound:     Left side: Spleen, left hemidiaphragm, left lower lobe of lung identified.  No pleural effusion identified.  Images saved  Right side: Liver, right hemidiaphragm, right lower lobe of lung identified.  Small pleural effusion identified.  Images saved     Site marked for thoracentesis.        CPT 58556 with thoracentesis note    Electronically signed by Lucio Murillo MD, 05/12/23, 10:55 AM EDT.

## 2023-05-13 LAB — TRIGL FLD-MCNC: 26 MG/DL

## 2023-05-15 LAB
BACTERIA FLD CULT: NORMAL
GRAM STN SPEC: NORMAL

## 2023-05-17 LAB
BACTERIA SPEC ANAEROBE CULT: NORMAL
CHOLEST FLD-MCNC: 108 MG/DL

## 2023-05-18 ENCOUNTER — PATIENT ROUNDING (BHMG ONLY) (OUTPATIENT)
Dept: PULMONOLOGY | Facility: CLINIC | Age: 38
End: 2023-05-18
Payer: MEDICAID

## 2023-05-18 NOTE — PROGRESS NOTES
May 18, 2023    Hello, may I speak with Regino Fox?    My name is Kathya     I am  with [facility] PULMONARY & CRITICAL CARE MEDICINE  [address]  [address]  [facility] [place] [address] [phone].    Before we get started may I verify your date of birth? 1985    I am calling to officially welcome you to our practice and ask about your recent visit. Is this a good time to talk? My chart message sent for patient rounding.

## 2023-05-19 LAB
FUNGUS WND CULT: NORMAL
MYCOBACTERIUM SPEC CULT: NORMAL
NIGHT BLUE STAIN TISS: NORMAL

## 2023-05-26 LAB
FUNGUS WND CULT: NORMAL
MYCOBACTERIUM SPEC CULT: NORMAL
NIGHT BLUE STAIN TISS: NORMAL

## 2023-05-30 RX ORDER — MONTELUKAST SODIUM 10 MG/1
TABLET ORAL
Qty: 30 TABLET | Refills: 1 | OUTPATIENT
Start: 2023-05-30

## 2023-06-02 LAB
FUNGUS WND CULT: NORMAL
MYCOBACTERIUM SPEC CULT: NORMAL
NIGHT BLUE STAIN TISS: NORMAL

## 2023-06-09 LAB
FUNGUS WND CULT: NORMAL
MYCOBACTERIUM SPEC CULT: NORMAL
NIGHT BLUE STAIN TISS: NORMAL

## 2023-06-16 LAB
MYCOBACTERIUM SPEC CULT: NORMAL
NIGHT BLUE STAIN TISS: NORMAL

## 2023-06-23 LAB
MYCOBACTERIUM SPEC CULT: NORMAL
NIGHT BLUE STAIN TISS: NORMAL

## 2023-08-08 ENCOUNTER — OFFICE VISIT (OUTPATIENT)
Dept: PULMONOLOGY | Facility: CLINIC | Age: 38
End: 2023-08-08
Payer: MEDICAID

## 2023-08-08 VITALS
RESPIRATION RATE: 16 BRPM | DIASTOLIC BLOOD PRESSURE: 70 MMHG | SYSTOLIC BLOOD PRESSURE: 104 MMHG | HEIGHT: 78 IN | BODY MASS INDEX: 20.48 KG/M2 | HEART RATE: 77 BPM | OXYGEN SATURATION: 99 %

## 2023-08-08 DIAGNOSIS — R59.0 HILAR ADENOPATHY: ICD-10-CM

## 2023-08-08 DIAGNOSIS — I34.0 MITRAL VALVE INSUFFICIENCY, UNSPECIFIED ETIOLOGY: ICD-10-CM

## 2023-08-08 DIAGNOSIS — Q87.40 MARFAN SYNDROME: Primary | ICD-10-CM

## 2023-08-08 DIAGNOSIS — F17.201 TOBACCO ABUSE, IN REMISSION: ICD-10-CM

## 2023-08-08 PROCEDURE — 1160F RVW MEDS BY RX/DR IN RCRD: CPT | Performed by: NURSE PRACTITIONER

## 2023-08-08 PROCEDURE — 99213 OFFICE O/P EST LOW 20 MIN: CPT | Performed by: NURSE PRACTITIONER

## 2023-08-08 PROCEDURE — 1159F MED LIST DOCD IN RCRD: CPT | Performed by: NURSE PRACTITIONER

## 2023-08-08 RX ORDER — ASPIRIN 81 MG/1
1 TABLET, COATED ORAL DAILY
COMMUNITY
Start: 2023-07-07

## 2023-08-08 RX ORDER — FOLIC ACID 1 MG/1
1 TABLET ORAL DAILY
COMMUNITY
Start: 2023-07-07

## 2023-08-08 RX ORDER — ATENOLOL 25 MG/1
1 TABLET ORAL DAILY
COMMUNITY
Start: 2023-07-07

## 2023-08-08 NOTE — PROGRESS NOTES
Primary Care Provider  Veronica Chu APRN     Referring Provider  No ref. provider found     Chief Complaint  Follow-up    Subjective          History of Presenting Illness  Patient is a 37-year-old male, patient of Dr. Phillip who presents for management of dyspnea and pleural effusion who presents for follow-up visit today.  Patient had a mitral valve repair via open sternotomy and postoperatively patient was noted to have bilateral pleural effusions.  The left side resolved but the right side was slightly worsening.  Patient had no previous history of pleural effusion prior surgery.  Patient's NT BNP was normal and patient denies any leg swelling or paroxysmal nocturnal dyspnea.  Patient at that time was having orthopnea and increasing cough with lying flat.  Patient denied any dyspnea on activity.  Patient had been seen in the ER at that time and had an x-ray which showed pleural effusion enlarging.  Patient also had a stable right hilar adenopathy dating back to 2022.  No infiltrates or masses.  It was recommend that the patient proceed with thoracentesis.  Since last office visit patient had thoracentesis completed on 5/12/2023.  Patient had a chest x-ray completed after thoracentesis on 5/12/2023.  Report states small right-sided pleural effusion stable postoperative changes no evidence of pneumothorax.  Patient states that since having thoracentesis completed his symptoms have resolved.  Patient currently denies any dyspnea, cough, or wheezing.  Patient states that he is following with cardiology as scheduled.  Patient denies fever, chills, night sweats, swollen glands in the head and neck, unintentional weight loss, hemoptysis, purulent sputum production, dysphagia, chest pain, palpitations, chest tightness, abdominal pain, nausea, vomiting, and diarrhea.  Patient also denies any myalgias, changes in sense of taste and/or smell, sore throat, any other coronavirus or flu-like symptoms.  Patient denies any  leg swelling, orthopnea, paroxysmal nocturnal dyspnea.  Patient is able to perform activities of daily living.        Review of Systems   Constitutional:  Negative for activity change, appetite change, chills, diaphoresis, fatigue, fever, unexpected weight gain and unexpected weight loss.        Negative for Insomnia   HENT:  Negative for congestion (Nasal), mouth sores, nosebleeds, postnasal drip, sore throat, swollen glands and trouble swallowing.         Negative for Thrush  Negative for Hoarseness  Negative for Allergies/Hay Fever  Negative for Recent Head injury  Negative for Ear Fullness  Negative for Nasal or Sinus pain  Negative for Dry lips  Negative for Nasal discharge   Respiratory:  Negative for apnea, cough, chest tightness, shortness of breath and wheezing.         Negative for Hemoptysis  Negative for Pleuritic pain   Cardiovascular:  Negative for chest pain, palpitations and leg swelling.        Negative for Claudication  Negative for Cyanosis  Negative for Dyspnea on exertion   Gastrointestinal:  Negative for abdominal pain, diarrhea, nausea, vomiting and GERD.   Musculoskeletal:  Negative for joint swelling and myalgias.        Negative for Joint pain  Negative for Joint stiffness   Skin:  Negative for color change, dry skin, pallor and rash.   Neurological:  Negative for syncope, weakness and headache.   Hematological:  Negative for adenopathy. Does not bruise/bleed easily.      Family History   Problem Relation Age of Onset    Aortic aneurysm Mother     Heart attack Mother     No Known Problems Father     Malig Hyperthermia Neg Hx         Social History     Socioeconomic History    Marital status: Single   Tobacco Use    Smoking status: Former     Packs/day: 0.25     Years: 10.00     Pack years: 2.50     Types: Cigarettes     Start date: 2010     Quit date: 2020     Years since quitting: 3.6     Passive exposure: Past    Smokeless tobacco: Never    Tobacco comments:     MORE OF SOCIAL SMOKER FOR  8 YEARS     Smoked at most 4 cpd    Vaping Use    Vaping Use: Former    Start date: 2/1/2020    Quit date: 2/1/2023    Substances: Nicotine, Flavoring    Devices: Disposable   Substance and Sexual Activity    Alcohol use: Yes     Comment: rarely    Drug use: Never    Sexual activity: Defer        Past Medical History:   Diagnosis Date    Asthma     Bicuspid aortic valve     Broken ankle, left, sequela     HX    CHF (congestive heart failure)     Congenital heart disease     Dyspnea on exertion     Heart murmur     Heart valve disease     History of migraine     Hypertension     QUIT TAKING MEDS AGE 19 DUE TO INSURANCE REASONS    Marfan syndrome     Mitral valve prolapse     Scoliosis     TIA (transient ischemic attack)     HX        Immunization History   Administered Date(s) Administered    Td (TDVAX) 08/04/1999       No Known Allergies       Current Outpatient Medications:     Aspirin Low Dose 81 MG EC tablet, Take 1 tablet by mouth Daily., Disp: , Rfl:     atenolol (TENORMIN) 25 MG tablet, Take 1 tablet by mouth Daily., Disp: , Rfl:     folic acid (FOLVITE) 1 MG tablet, Take 1 tablet by mouth Daily., Disp: , Rfl:     escitalopram (Lexapro) 10 MG tablet, Take 1 tablet by mouth Daily. (Patient not taking: Reported on 8/8/2023), Disp: 30 tablet, Rfl: 11     Objective     Physical Exam  Vital Signs:   WDWN, Alert, NAD.    HEENT:  PERRL, EOMI.  OP, nares clear, no sinus tenderness  Neck:  Supple, no JVD, no thyromegaly.  Lymph: no axillary, cervical, supraclavicular lymphadenopathy noted bilaterally  Chest:  good aeration, clear to auscultation bilaterally, tympanic to percussion bilaterally, no work of breathing noted  CV: RRR, no MGR, pulses 2+, equal.  Abd:  Soft, NT, ND, + BS, no HSM  EXT:  no clubbing, no cyanosis, no edema, no joint tenderness  Neuro:  A&Ox3, CN grossly intact, no focal deficits.  Skin: No rashes or lesions noted.    /70 (BP Location: Left arm, Patient Position: Sitting, Cuff Size: Large  "Adult)   Pulse 77   Resp 16   Ht 198.1 cm (77.99\")   SpO2 99% Comment: room air  BMI 20.48 kg/mý         Result Review :   I have reviewed thoracentesis results and chest x-ray report dated from 5/12/2023.  See scanned reports.    Procedures:        XR Chest 1 View    Result Date: 5/3/2023   No acute infiltrate is seen.  A small right pleural effusion is identified.      Please note that portions of this note were completed with a voice recognition program.  SIMEON MONTALVO JR, MD       Electronically Signed and Approved By: SIMEON MONTALVO JR, MD on 5/03/2023 at 0:54                  Assessment and Plan      Assessment:  1. Enlarging right pleural effusion, improved status postthoracentesis on 5/12/2023.  2. Right hilar adenopathy.  1.5 cm and stable since 2022.  3. Orthopnea, resolved per patient report.  4. Acute dyspnea, resolved per patient report.  5. Marfan syndrome.  6. Mitral valve insufficiency status post mitral valve repair in 2023.  7. Peripheral eosinophilia  8.  Seasonal allergies.  9.  Tobacco abuse cigarettes in remission.  Not eligible for lung cancer screening.        Plan:  1.  Patient reports that his symptoms have resolved since having thoracentesis completed.  Signs and symptoms of fluid accumulation discussed with the patient.  Patient is advised to call the office and/or go to the ER with any new or worsening symptoms.  2.  Will repeat chest CT scan again in 6 months. Order placed today.   3.  Vaccination status:  patient declines flu, pneumonia, and COVID-19 vaccinations.  Discussed with patient the benefits of vaccination including decreased risk of severe illness, hospitalization and death related to flu, pneumonia, and COVID-19.  Patient verbalized understanding.  Patient is advised to continue to follow CDC recommendations such as social distancing, wearing a mask, and washing hands for least 20 seconds.  4.  Smoking status: Patient is a former cigarette smoker. Not eligible for lung " cancer screening.   5.  Patient to call the office, 911, or go to the ER with new or worsening symptoms.  6.  Follow-up December 2023, sooner if needed.              Follow Up   Return for December 2023.  Patient was given instructions and counseling regarding his condition or for health maintenance advice. Please see specific information pulled into the AVS if appropriate.

## 2023-09-15 ENCOUNTER — TELEPHONE (OUTPATIENT)
Dept: CARDIOLOGY | Facility: CLINIC | Age: 38
End: 2023-09-15
Payer: MEDICAID

## 2023-09-15 NOTE — TELEPHONE ENCOUNTER
Procedure: CDL Clearance    Medication Directive:    PMH: Mitral Valve repair (02/17/23), CHF, HTN, Marfans    Last Seen: 06/29/23     ECHO: 05/05/23   Left ventricular ejection fraction appears to be 56 - 60%.    Left ventricular wall thickness is consistent with borderline concentric hypertrophy.    Left ventricular diastolic function was normal.    Aortic valve is not well visualized but is thought to be bicuspid.  There is mild aortic insufficiency.  No significant aortic stenosis.    Status post mitral valve repair with no significant gradients and mild mitral regurgitation.      Please fax clearance to [phone]- Work well

## 2023-12-02 NOTE — PROGRESS NOTES
Primary Care Provider  Veronica Chu APRN     Referring Provider  No ref. provider found     Chief Complaint  Follow-up (4 month follow up. )    Subjective          History of Presenting Illness  Patient is a 37-year-old male, patient of Dr. Phillip who presents for management of dyspnea and pleural effusion who presents for follow-up visit today.  Patient had a mitral valve repair via open sternotomy and postoperatively patient was noted to have bilateral pleural effusions.   Patient had thoracentesis completed at that time.  Patient had reported that since having thoracentesis completed his symptoms have resolved.  Patient currently denies any dyspnea, cough, or wheezing.  Patient was supposed to have a repeat chest CT scan in November 2023, however patient did not have this completed yet.  Patient denies fever, chills, night sweats, swollen glands in the head and neck, unintentional weight loss, hemoptysis, purulent sputum production, dysphagia, chest pain, palpitations, chest tightness, abdominal pain, nausea, vomiting, and diarrhea.  Patient also denies any myalgias, changes in sense of taste and/or smell, sore throat, any other coronavirus or flu-like symptoms.  Patient denies any leg swelling, orthopnea, paroxysmal nocturnal dyspnea.  Patient is able to perform activities of daily living.        Review of Systems   Constitutional:  Negative for activity change, appetite change, chills, diaphoresis, fatigue, fever, unexpected weight gain and unexpected weight loss.        Negative for Insomnia   HENT:  Negative for congestion (Nasal), mouth sores, nosebleeds, postnasal drip, sore throat, swollen glands and trouble swallowing.         Negative for Thrush  Negative for Hoarseness  Negative for Allergies/Hay Fever  Negative for Recent Head injury  Negative for Ear Fullness  Negative for Nasal or Sinus pain  Negative for Dry lips  Negative for Nasal discharge   Respiratory:  Negative for apnea, cough, chest  tightness, shortness of breath and wheezing.         Negative for Hemoptysis  Negative for Pleuritic pain   Cardiovascular:  Negative for chest pain, palpitations and leg swelling.        Negative for Claudication  Negative for Cyanosis  Negative for Dyspnea on exertion   Gastrointestinal:  Negative for abdominal pain, diarrhea, nausea, vomiting and GERD.   Musculoskeletal:  Negative for joint swelling and myalgias.        Negative for Joint pain  Negative for Joint stiffness   Skin:  Negative for color change, dry skin, pallor and rash.   Neurological:  Negative for syncope, weakness and headache.   Hematological:  Negative for adenopathy. Does not bruise/bleed easily.        Family History   Problem Relation Age of Onset    Aortic aneurysm Mother     Heart attack Mother     No Known Problems Father     Malig Hyperthermia Neg Hx         Social History     Socioeconomic History    Marital status: Single   Tobacco Use    Smoking status: Former     Packs/day: 0.25     Years: 10.00     Additional pack years: 0.00     Total pack years: 2.50     Types: Cigarettes     Start date: 2010     Quit date: 2020     Years since quitting: 3.9     Passive exposure: Past    Smokeless tobacco: Never    Tobacco comments:     MORE OF SOCIAL SMOKER FOR 8 YEARS     Smoked at most 4 cpd    Vaping Use    Vaping Use: Former    Start date: 2/1/2020    Quit date: 2/1/2023    Substances: Nicotine, Flavoring    Devices: Disposable   Substance and Sexual Activity    Alcohol use: Yes     Comment: rarely    Drug use: Never    Sexual activity: Defer        Past Medical History:   Diagnosis Date    Asthma     Bicuspid aortic valve     Broken ankle, left, sequela     HX    CHF (congestive heart failure)     Congenital heart disease     Dyspnea on exertion     Heart murmur     Heart valve disease     History of migraine     Hypertension     QUIT TAKING MEDS AGE 19 DUE TO INSURANCE REASONS    Marfan syndrome     Mitral valve prolapse     Scoliosis      "TIA (transient ischemic attack)     HX        Immunization History   Administered Date(s) Administered    Td (TDVAX) 08/04/1999       No Known Allergies       Current Outpatient Medications:     Aspirin Low Dose 81 MG EC tablet, Take 1 tablet by mouth Daily., Disp: , Rfl:     atenolol (TENORMIN) 25 MG tablet, Take 1 tablet by mouth Daily., Disp: , Rfl:     folic acid (FOLVITE) 1 MG tablet, Take 1 tablet by mouth Daily., Disp: , Rfl:     amoxicillin (AMOXIL) 500 MG capsule, TAKE 2 CAPS 3 HRS BEFORE PROCEDURE AND TAKE 2 CAPS 1HR BEFORE PROCEDURE, Disp: , Rfl:     escitalopram (Lexapro) 10 MG tablet, Take 1 tablet by mouth Daily. (Patient not taking: Reported on 8/8/2023), Disp: 30 tablet, Rfl: 11    vitamin D (ERGOCALCIFEROL) 1.25 MG (34503 UT) capsule capsule, , Disp: , Rfl:      Objective     Physical Exam  Vital Signs:   WDWN, Alert, NAD.    HEENT:  PERRL, EOMI.  OP, nares clear, no sinus tenderness  Neck:  Supple, no JVD, no thyromegaly.  Lymph: no axillary, cervical, supraclavicular lymphadenopathy noted bilaterally  Chest:  good aeration, clear to auscultation bilaterally, tympanic to percussion bilaterally, no work of breathing noted  CV: RRR, no MGR, pulses 2+, equal.  Abd:  Soft, NT, ND, + BS, no HSM  EXT:  no clubbing, no cyanosis, no edema, no joint tenderness  Neuro:  A&Ox3, CN grossly intact, no focal deficits.  Skin: No rashes or lesions noted.    /75 (BP Location: Right arm, Patient Position: Sitting, Cuff Size: Large Adult)   Pulse 93   Resp 16   Ht 198.1 cm (77.99\")   Wt 82.1 kg (181 lb 1.6 oz)   SpO2 93% Comment: room air  BMI 20.93 kg/m²         Result Review :   I have reviewed my last office visit note.    Procedures:           Assessment and Plan      Assessment:  1. Right pleural effusion, status postthoracentesis on 5/12/2023.  2. Right hilar adenopathy.  1.5 cm and stable since 2022.  3. Orthopnea, resolved per patient report.  4. Dyspnea, resolved per patient report.  5. Marfan " syndrome.  6. Mitral valve insufficiency status post mitral valve repair in 2023.  7. Peripheral eosinophilia  8.  Seasonal allergies.  9.  Tobacco abuse cigarettes in remission.  Not eligible for lung cancer screening.          Plan:  1.  Patient reports that his symptoms have resolved since having thoracentesis completed.  Signs and symptoms of fluid accumulation discussed with the patient.  Patient is advised to call the office and/or go to the ER with any new or worsening symptoms.  2.  Patient was supposed to have a repeat chest CT scan in November 2023, however did not have this completed.  Will reorder chest CT scan today.  3.  Vaccination status:  patient declines flu, pneumonia, and COVID-19 vaccinations.  Discussed with patient the benefits of vaccination including decreased risk of severe illness, hospitalization and death related to flu, pneumonia, and COVID-19.  Patient verbalized understanding.  Patient is advised to continue to follow CDC recommendations such as social distancing, wearing a mask, and washing hands for least 20 seconds.  4.  Smoking status: Patient is a former cigarette smoker. Not eligible for lung cancer screening.   5.  Patient to call the office, 911, or go to the ER with new or worsening symptoms.  6.  Follow-up in 6 weeks, sooner if needed.            Follow Up   Return for 6 weeks.  Patient was given instructions and counseling regarding his condition or for health maintenance advice. Please see specific information pulled into the AVS if appropriate.

## 2023-12-11 ENCOUNTER — OFFICE VISIT (OUTPATIENT)
Dept: PULMONOLOGY | Facility: CLINIC | Age: 38
End: 2023-12-11
Payer: MEDICAID

## 2023-12-11 VITALS
HEIGHT: 78 IN | DIASTOLIC BLOOD PRESSURE: 75 MMHG | OXYGEN SATURATION: 93 % | HEART RATE: 93 BPM | SYSTOLIC BLOOD PRESSURE: 111 MMHG | RESPIRATION RATE: 16 BRPM | BODY MASS INDEX: 20.95 KG/M2 | WEIGHT: 181.1 LBS

## 2023-12-11 DIAGNOSIS — D72.19 PERIPHERAL EOSINOPHILIA: ICD-10-CM

## 2023-12-11 DIAGNOSIS — J30.2 SEASONAL ALLERGIES: ICD-10-CM

## 2023-12-11 DIAGNOSIS — Q87.40 MARFAN SYNDROME: ICD-10-CM

## 2023-12-11 DIAGNOSIS — F17.201 TOBACCO ABUSE, IN REMISSION: Primary | ICD-10-CM

## 2023-12-11 DIAGNOSIS — I34.0 MITRAL VALVE INSUFFICIENCY, UNSPECIFIED ETIOLOGY: ICD-10-CM

## 2023-12-11 DIAGNOSIS — R59.0 HILAR ADENOPATHY: ICD-10-CM

## 2023-12-11 PROCEDURE — 99213 OFFICE O/P EST LOW 20 MIN: CPT | Performed by: NURSE PRACTITIONER

## 2023-12-11 PROCEDURE — 1160F RVW MEDS BY RX/DR IN RCRD: CPT | Performed by: NURSE PRACTITIONER

## 2023-12-11 PROCEDURE — 1159F MED LIST DOCD IN RCRD: CPT | Performed by: NURSE PRACTITIONER

## 2023-12-11 RX ORDER — AMOXICILLIN 500 MG/1
CAPSULE ORAL
COMMUNITY
Start: 2023-10-03

## 2023-12-11 RX ORDER — ERGOCALCIFEROL 1.25 MG/1
CAPSULE ORAL
COMMUNITY
Start: 2023-11-27

## 2023-12-29 ENCOUNTER — OFFICE VISIT (OUTPATIENT)
Dept: CARDIOLOGY | Facility: CLINIC | Age: 38
End: 2023-12-29
Payer: MEDICAID

## 2023-12-29 VITALS
WEIGHT: 186.6 LBS | BODY MASS INDEX: 21.59 KG/M2 | DIASTOLIC BLOOD PRESSURE: 78 MMHG | HEIGHT: 78 IN | SYSTOLIC BLOOD PRESSURE: 110 MMHG | HEART RATE: 89 BPM

## 2023-12-29 DIAGNOSIS — Q23.1 BICUSPID AORTIC VALVE: ICD-10-CM

## 2023-12-29 DIAGNOSIS — N53.19 ANEJACULATION: ICD-10-CM

## 2023-12-29 DIAGNOSIS — I35.1 NONRHEUMATIC AORTIC VALVE INSUFFICIENCY: Primary | ICD-10-CM

## 2023-12-29 DIAGNOSIS — I34.0 NONRHEUMATIC MITRAL VALVE REGURGITATION: ICD-10-CM

## 2023-12-29 DIAGNOSIS — Z98.890 STATUS POST MITRAL VALVE REPAIR: ICD-10-CM

## 2023-12-29 PROCEDURE — 99214 OFFICE O/P EST MOD 30 MIN: CPT

## 2023-12-29 PROCEDURE — 1160F RVW MEDS BY RX/DR IN RCRD: CPT

## 2023-12-29 PROCEDURE — 1159F MED LIST DOCD IN RCRD: CPT

## 2023-12-29 RX ORDER — HYDROXYZINE HYDROCHLORIDE 10 MG/1
10 TABLET, FILM COATED ORAL 3 TIMES DAILY PRN
Qty: 30 TABLET | Refills: 3 | Status: SHIPPED | OUTPATIENT
Start: 2023-12-29

## 2023-12-29 RX ORDER — HYDROXYZINE HYDROCHLORIDE 10 MG/1
10 TABLET, FILM COATED ORAL 3 TIMES DAILY PRN
Qty: 30 TABLET | Refills: 3 | Status: SHIPPED | OUTPATIENT
Start: 2023-12-29 | End: 2023-12-29 | Stop reason: SDUPTHER

## 2023-12-29 RX ORDER — METOPROLOL SUCCINATE 25 MG/1
25 TABLET, EXTENDED RELEASE ORAL DAILY
Qty: 90 TABLET | Refills: 3 | Status: SHIPPED | OUTPATIENT
Start: 2023-12-29

## 2023-12-29 NOTE — PROGRESS NOTES
Chief Complaint  Nonrheumatic aortic valve insufficiency and Dizziness    Subjective        History of Present Illness  Regino Fox presents to [facility] for follow up.  37-year-old male with past medical history of severe mitral valve regurgitation, Marfan syndrome, history of ITP who is status post complex mitral valve repair with a 42 mm [employer] flexible band and chordal repair of A2 segment, primary closure of patent foramen ovale on February 17, 2023 with Dr. Gann.He is doing well overall.  He has occasional sharp pains of chest discomfort but reports he is able to workout regularly and exert himself with no chest pain.  He gets short of breath with intense exertion.  He has rare palpitations.  He does sometimes get dizzy with position changes.  He reports that he does not drink very much water.  He is under a lot of stress.  He reports he has lost the ability to ejaculate and is very concerned about this.  He denies any edema, syncope or presyncope.      Past Medical History:   Diagnosis Date    Asthma     Bicuspid aortic valve     Broken ankle, left, sequela     HX    CHF (congestive heart failure)     Congenital heart disease     Dyspnea on exertion     Heart murmur     Heart valve disease     History of migraine     Hypertension     QUIT TAKING MEDS AGE 19 DUE TO INSURANCE REASONS    Marfan syndrome     Mitral valve prolapse     Scoliosis     TIA (transient ischemic attack)     HX       ALLERGY  No Known Allergies     Past Surgical History:   Procedure Laterality Date    FEMUR SURGERY Right     X2 FOR FRACTURE AGE 11    LYMPH NODE DISSECTION      NECK    MITRAL VALVE REPAIR/REPLACEMENT N/A 2/17/2023    Procedure: JEFFERSON STERNOTOMY MITRAL VALVE REPAIR AND PRP;  Surgeon: Matthew Gann MD;  Location: [facility];  Service: Cardiothoracic;  Laterality: N/A;    TRANSESOPHAGEAL ECHOCARDIOGRAM (JEFFERSON) N/A 08/11/2022    Procedure: TRANSESOPHAGEAL ECHOCARDIOGRAM;  Surgeon:  "ADRIANNA Bernard MD;  Location: [facility] ENDOSCOPY;  Service: Cardiology;  Laterality: N/A;        Social History     Socioeconomic History    Marital status: Single   Tobacco Use    Smoking status: Former     Packs/day: 0.25     Years: 10.00     Additional pack years: 0.00     Total pack years: 2.50     Types: Cigarettes     Start date: 2010     Quit date: 2020     Years since quitting: 3.9     Passive exposure: Past    Smokeless tobacco: Never    Tobacco comments:     MORE OF SOCIAL SMOKER FOR 8 YEARS     Smoked at most 4 cpd    Vaping Use    Vaping Use: Former    Start date: 2/1/2020    Quit date: 2/1/2023    Substances: Nicotine, Flavoring    Devices: Disposable   Substance and Sexual Activity    Alcohol use: Yes     Comment: rarely    Drug use: Never    Sexual activity: Defer       Family History   Problem Relation Age of Onset    Aortic aneurysm Mother     Heart attack Mother     No Known Problems Father     Malig Hyperthermia Neg Hx         Current Outpatient Medications on File Prior to Visit   Medication Sig    amoxicillin (AMOXIL) 500 MG capsule TAKE 2 CAPS 3 HRS BEFORE PROCEDURE AND TAKE 2 CAPS 1HR BEFORE PROCEDURE    Aspirin Low Dose 81 MG EC tablet Take 1 tablet by mouth Daily.    atenolol (TENORMIN) 25 MG tablet Take 1 tablet by mouth Daily.    escitalopram (Lexapro) 10 MG tablet Take 1 tablet by mouth Daily. (Patient not taking: Reported on 8/8/2023)    folic acid (FOLVITE) 1 MG tablet Take 1 tablet by mouth Daily.    vitamin D (ERGOCALCIFEROL) 1.25 MG (22700 UT) capsule capsule  (Patient not taking: Reported on 12/11/2023)     No current facility-administered medications on file prior to visit.       Objective   Vitals:    12/29/23 1544   Height: 198.1 cm (77.99\")       Physical Exam  Constitutional:       General: He is awake. He is not in acute distress.     Appearance: Normal appearance.   HENT:      Head: Normocephalic.      Nose: Nose normal. No congestion.   Eyes:      Extraocular Movements: " Extraocular movements intact.      Conjunctiva/sclera: Conjunctivae normal.      Pupils: Pupils are equal, round, and reactive to light.   Neck:      Thyroid: No thyromegaly.      Vascular: No JVD.   Cardiovascular:      Rate and Rhythm: Normal rate and regular rhythm.      Chest Wall: PMI is not displaced.      Pulses: Normal pulses.      Heart sounds: Normal heart sounds, S1 normal and S2 normal. No murmur heard.     No friction rub. No gallop. No S3 or S4 sounds.   Pulmonary:      Effort: Pulmonary effort is normal.      Breath sounds: Normal breath sounds. No wheezing, rhonchi or rales.   Abdominal:      General: Bowel sounds are normal.      Palpations: Abdomen is soft.      Tenderness: There is no abdominal tenderness.   Musculoskeletal:      Cervical back: No tenderness.      Right lower leg: No edema.      Left lower leg: No edema.   Lymphadenopathy:      Cervical: No cervical adenopathy.   Skin:     General: Skin is warm and dry.      Capillary Refill: Capillary refill takes less than 2 seconds.      Coloration: Skin is not cyanotic.      Findings: No petechiae or rash.      Nails: There is no clubbing.   Neurological:      Mental Status: He is alert.   Psychiatric:         Mood and Affect: Mood normal.         Behavior: Behavior is cooperative.           Result Review     The following data was reviewed by GUILLERMINA Mcgrath on 12/29/23.    proBNP   Date Value Ref Range Status   05/02/2023 188.7 0.0 - 450.0 pg/mL Final     CMP          2/21/2023    03:26 2/22/2023    14:52 5/2/2023    23:43   CMP   Glucose 103  137  110    BUN 11  14  11    Creatinine 0.84  1.08  0.85    EGFR 113.8  89.5  113.4    Sodium 131  136  137    Potassium 4.0  4.4  4.2    Chloride 96  98  103    Calcium 8.7  9.4  9.4    Total Protein  7.7  7.7    Albumin  4.0  4.2    Globulin  3.7  3.5    Total Bilirubin  0.7  0.3    Alkaline Phosphatase  63  108    AST (SGOT)  25  13    ALT (SGPT)  23  9    Albumin/Globulin Ratio  1.1  1.2     BUN/Creatinine Ratio 13.1  13.0  12.9    Anion Gap 7.1  11.8  11.9      CBC w/diff          2/21/2023    03:26 2/22/2023    14:52 5/2/2023    23:43   CBC w/Diff   WBC 8.73  9.20  11.00    RBC 3.96  4.20  5.17    Hemoglobin 11.7  12.5  13.8    Hematocrit 34.7  37.0  41.9    MCV 87.6  88.1  81.0    MCH 29.5  29.8  26.7    MCHC 33.7  33.8  32.9    RDW 12.9  13.2  13.2    Platelets 125  188  180    Neutrophil Rel %  68.8  68.4    Immature Granulocyte Rel %  4.5  0.3    Lymphocyte Rel %  9.9  14.1    Monocyte Rel %  13.7  7.3    Eosinophil Rel %  2.3  8.9    Basophil Rel %  0.8  1.0       Lipid Panel          2/15/2023    09:48   Lipid Panel   Total Cholesterol 146    Triglycerides 71    HDL Cholesterol 47    VLDL Cholesterol 14    LDL Cholesterol  85    LDL/HDL Ratio 1.80        Results for orders placed during the hospital encounter of 05/05/23    Adult Transthoracic Echo Complete W/ Cont if Necessary Per Protocol    Interpretation Summary    Left ventricular ejection fraction appears to be 56 - 60%.    Left ventricular wall thickness is consistent with borderline concentric hypertrophy.    Left ventricular diastolic function was normal.    Aortic valve is not well visualized but is thought to be bicuspid.  There is mild aortic insufficiency.  No significant aortic stenosis.    Status post mitral valve repair with no significant gradients and mild mitral regurgitation.    Results for orders placed during the hospital encounter of 09/14/22    Stress Test With Myocardial Perfusion One Day    Interpretation Summary  · Left ventricular ejection fraction is normal. (Calculated EF = 53%).  · Myocardial perfusion imaging indicates a normal myocardial perfusion study with no evidence of ischemia.  · Impressions are consistent with a low risk study.  · Findings consistent with a normal ECG stress test.         Procedures    Assessment & Plan  Diagnoses and all orders for this visit:    1. Nonrheumatic aortic valve  insufficiency (Primary)    2. Nonrheumatic mitral valve regurgitation    3. Status post mitral valve repair    4. Bicuspid aortic valve        1.  Recent echocardiogram demonstrates possible bicuspid aortic valve with mild aortic insufficiency and no significant aortic stenosis.  Plan to repeat echocardiogram in May 2025 for follow-up.  2.  Status post mitral valve repair 2/17/2023.  Recent echocardiogram demonstrated mild mitral regurgitation.  Patient is asymptomatic from that standpoint.  3.  As above.  Repeat echo to be done in May 2025.  4.  As above.  5.  Atenolol will be discontinued.  He was advised to stop the medication for a week or 2 and see if his inability to ejaculate has resolved.  If it has he will be started on metoprolol 25 mg daily.  He was advised that he needs beta-blocker due to his Marfan syndrome and history of mitral valve repair.  If the problem continues despite discontinuing beta-blocker therapy will refer to urology.    Follow Up   No follow-ups on file.    Patient was given instructions and counseling regarding his condition or for health maintenance advice. Please see specific information pulled into the AVS if appropriate.     Shala Carr, APRN  12/29/23  15:44 EST    Dictated Utilizing Dragon Dictation

## 2024-01-10 ENCOUNTER — TELEPHONE (OUTPATIENT)
Dept: PULMONOLOGY | Facility: CLINIC | Age: 39
End: 2024-01-10
Payer: MEDICAID

## 2024-01-10 NOTE — TELEPHONE ENCOUNTER
Patient is scheduled to see me on 1/22/2024.  Patient was supposed to have a repeat chest CT scan back in December 2023.  Can we please get with Analisa and get this scheduled for the patient. Thank you.

## 2024-01-10 NOTE — PROGRESS NOTES
Primary Care Provider  Veronica Chu APRN     Referring Provider  No ref. provider found     Chief Complaint  Follow-up, Cough, Shortness of Breath, and Wheezing    Subjective          History of Presenting Illness  Patient is a 38-year-old male, patient of Dr. Phillip who presents for management of dyspnea and pleural effusion who presents for follow-up visit today.  Patient had a mitral valve repair via open sternotomy and postoperatively patient was noted to have bilateral pleural effusions.   Patient had thoracentesis completed at that time.     Patient reports that since last office visit he was seen in the ER yesterday on 1/21/2024 for chest pain.  Patient had a chest x-ray completed on 1/22/2024.  Report states no acute cardiopulmonary abnormality is identified.  Patient had a CT angiogram of the chest completed as well.  Report states no evidence of thoracic aortic aneurysm or dissection. Circumferential wall thickening of the mid to distal esophagus, which is nonspecific, but can be seen with esophagitis.  Consider correlation with endoscopy.  Patient states that he was scheduled to see GUILLERMINA Lewis, however canceled the appointment due to work.  Patient is needing referral to go back and see gastroenterologist.  BNP, high-sensitivity troponin x 2, lipase level, and magnesium levels all came back normal in the emergency room on 1/21/2024.  CMP did not show any hypercapnia.  COVID and flu testing were both negative.  Patient is scheduled to see Dr. Cardenas, cardiologist on 2/2/2024.  Patient states that he does get short of breath that is worse when he is feeling anxious, mild in severity, and improved when he relaxes.  Patient states that he will be following with his primary care provider to see if he can start on medication for anxiety.  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  Patient denies fever,  chills, night sweats, swollen glands in the head and neck, unintentional weight loss, hemoptysis, purulent sputum production, palpitations, abdominal pain, nausea, vomiting, and diarrhea.  Patient also denies any myalgias, changes in sense of taste and/or smell, sore throat, any other coronavirus or flu-like symptoms.  Patient denies any leg swelling, orthopnea, paroxysmal nocturnal dyspnea.  Patient is able to perform activities of daily living.        Review of Systems   Constitutional:  Positive for fatigue. Negative for activity change, appetite change, chills, diaphoresis, fever, unexpected weight gain and unexpected weight loss.        Negative for Insomnia   HENT:  Positive for postnasal drip, rhinorrhea and trouble swallowing. Negative for congestion (Nasal), mouth sores, nosebleeds, sore throat and swollen glands.         Negative for Thrush  Negative for Hoarseness  Negative for Allergies/Hay Fever  Negative for Recent Head injury  Negative for Ear Fullness  Negative for Nasal or Sinus pain  Negative for Dry lips  Negative for Nasal discharge   Respiratory:  Positive for chest tightness (When feeling anxious) and shortness of breath (When feeling anxious). Negative for apnea, cough and wheezing.         Negative for Hemoptysis  Negative for Pleuritic pain   Cardiovascular:  Negative for chest pain, palpitations and leg swelling.        Negative for Claudication  Negative for Cyanosis  Negative for Dyspnea on exertion   Gastrointestinal:  Negative for abdominal pain, diarrhea, nausea, vomiting and GERD.   Musculoskeletal:  Negative for joint swelling and myalgias.        Negative for Joint pain  Negative for Joint stiffness   Skin:  Negative for color change, dry skin, pallor and rash.   Neurological:  Negative for syncope, weakness and headache.   Hematological:  Negative for adenopathy. Does not bruise/bleed easily.   Psychiatric/Behavioral:  Negative for hallucinations, self-injury, suicidal ideas and  depressed mood. The patient is nervous/anxious.         Family History   Problem Relation Age of Onset    Aortic aneurysm Mother     Heart attack Mother     No Known Problems Father     Malig Hyperthermia Neg Hx         Social History     Socioeconomic History    Marital status: Single   Tobacco Use    Smoking status: Former     Packs/day: 0.25     Years: 10.00     Additional pack years: 0.00     Total pack years: 2.50     Types: Cigarettes     Start date:      Quit date:      Years since quittin.0     Passive exposure: Past    Smokeless tobacco: Never    Tobacco comments:     MORE OF SOCIAL SMOKER FOR 8 YEARS     Smoked at most 4 cpd    Vaping Use    Vaping Use: Former    Start date: 2020    Quit date: 2023    Substances: Nicotine, Flavoring    Devices: Disposable   Substance and Sexual Activity    Alcohol use: Yes     Comment: rarely    Drug use: Never    Sexual activity: Defer        Past Medical History:   Diagnosis Date    Asthma     Bicuspid aortic valve     Broken ankle, left, sequela     HX    CHF (congestive heart failure)     Congenital heart disease     Dyspnea on exertion     Heart murmur     Heart valve disease     History of migraine     Hypertension     QUIT TAKING MEDS AGE 19 DUE TO INSURANCE REASONS    Marfan syndrome     Mitral valve prolapse     Scoliosis     TIA (transient ischemic attack)     HX        Immunization History   Administered Date(s) Administered    Td (TDVAX) 1999       No Known Allergies       Current Outpatient Medications:     Aspirin Low Dose 81 MG EC tablet, Take 1 tablet by mouth Daily., Disp: , Rfl:     folic acid (FOLVITE) 1 MG tablet, Take 1 tablet by mouth Daily., Disp: , Rfl:     metoprolol succinate XL (TOPROL-XL) 25 MG 24 hr tablet, Take 1 tablet by mouth Daily., Disp: 90 tablet, Rfl: 3    vitamin D (ERGOCALCIFEROL) 1.25 MG (92085 UT) capsule capsule, , Disp: , Rfl:     vitamin D3 125 MCG (5000 UT) capsule capsule, , Disp: , Rfl:      "hydrOXYzine (ATARAX) 10 MG tablet, Take 1 tablet by mouth 3 (Three) Times a Day As Needed for Anxiety. (Patient not taking: Reported on 1/22/2024), Disp: 30 tablet, Rfl: 3  No current facility-administered medications for this visit.     Objective     Physical Exam  Vital Signs:   WDWN, Alert, NAD.    HEENT:  PERRL, EOMI.  OP, nares clear, no sinus tenderness  Neck:  Supple, no JVD, no thyromegaly.  Lymph: no axillary, cervical, supraclavicular lymphadenopathy noted bilaterally  Chest:  good aeration, clear to auscultation bilaterally, tympanic to percussion bilaterally, no work of breathing noted  CV: RRR, no MGR, pulses 2+, equal.  Abd:  Soft, NT, ND, + BS, no HSM  EXT:  no clubbing, no cyanosis, no edema, no joint tenderness  Neuro:  A&Ox3, CN grossly intact, no focal deficits.  Skin: No rashes or lesions noted.    /72 (BP Location: Right arm, Patient Position: Sitting, Cuff Size: Large Adult)   Pulse 96   Resp 16   Ht 193 cm (75.98\")   Wt 81.5 kg (179 lb 11.2 oz)   SpO2 97% Comment: room air  BMI 21.88 kg/m²         Result Review :   I have reviewed my last office visit note.  I also reviewed ER visit summary report, lab results, chest x-ray report, and CT angiogram of the chest report dated from 1/21/2024.  See scanned reports.    Procedures:    CMP          2/22/2023    14:52 5/2/2023    23:43 1/21/2024    23:04   CMP   Glucose 137  110  115    BUN 14  11  9    Creatinine 1.08  0.85  1.03    EGFR 89.5  113.4  94.2    Sodium 136  137  137    Potassium 4.4  4.2  3.6    Chloride 98  103  99    Calcium 9.4  9.4  9.3    Total Protein 7.7  7.7  7.7    Albumin 4.0  4.2  4.2    Globulin 3.7  3.5  3.5    Total Bilirubin 0.7  0.3  0.9    Alkaline Phosphatase 63  108  81    AST (SGOT) 25  13  13    ALT (SGPT) 23  9  15    Albumin/Globulin Ratio 1.1  1.2  1.2    BUN/Creatinine Ratio 13.0  12.9  8.7    Anion Gap 11.8  11.9  13.3      CBC          2/22/2023    14:52 5/2/2023    23:43 1/21/2024    23:04   CBC "   WBC 9.20  11.00  11.32    RBC 4.20  5.17  4.91    Hemoglobin 12.5  13.8  14.6    Hematocrit 37.0  41.9  42.4    MCV 88.1  81.0  86.4    MCH 29.8  26.7  29.7    MCHC 33.8  32.9  34.4    RDW 13.2  13.2  12.3    Platelets 188  180  159      CBC w/diff          2/22/2023    14:52 5/2/2023    23:43 1/21/2024    23:04   CBC w/Diff   WBC 9.20  11.00  11.32    RBC 4.20  5.17  4.91    Hemoglobin 12.5  13.8  14.6    Hematocrit 37.0  41.9  42.4    MCV 88.1  81.0  86.4    MCH 29.8  26.7  29.7    MCHC 33.8  32.9  34.4    RDW 13.2  13.2  12.3    Platelets 188  180  159    Neutrophil Rel % 68.8  68.4  76.9    Immature Granulocyte Rel % 4.5  0.3  0.4    Lymphocyte Rel % 9.9  14.1  11.6    Monocyte Rel % 13.7  7.3  10.3    Eosinophil Rel % 2.3  8.9  0.4    Basophil Rel % 0.8  1.0  0.4           Assessment and Plan      Assessment:  1. Right pleural effusion, status postthoracentesis on 5/12/2023.  2. Right hilar adenopathy.  1.5 cm and stable since 2022.  3. Orthopnea, resolved per patient report.  4. Dyspnea      .  5. Marfan syndrome.  6. Mitral valve insufficiency status post mitral valve repair in 2023.  7. Peripheral eosinophilia  8.  Seasonal allergies.  9.  Esophagitis.  10.  Anxiety.  11.  Tobacco abuse cigarettes in remission.  Not eligible for lung cancer screening.        Plan:  1.  Patient was seen in the ER yesterday on 1/21/2024 for chest pain.  Labs were obtained at that time which showed no significant abnormality.  Two sets of troponins were both negative.  Patient does have a history of Marfan's.  CTA of the chest, abdomen, and pelvis were obtained that showed no evidence of thoracic aortic dissection or aneurysm and no abdominal aortic aneurysm or dissection.  Findings did show concerns of possible esophagitis.    2. Will refer patient back to gastroenterologist for esophagitis.  Referral placed today.  Recommend patient start a PPI, however patient declines at this time and would like to wait until he sees  gastroenterologist first before starting any medications.  3. Feno/exhaled nitric oxide testing performed in the office today with a level of 17 signifying no eosinophilic airway inflammation.    4.  Will order an IgE level, alpha 1 and antitrypsin level and genotype, pulmonary function test, and a 6-minute walk test.    5.  Recent chest x-ray did not show any fluid.   Signs and symptoms of fluid accumulation discussed with the patient.  Patient is advised to call the office and/or go to the ER with any new or worsening symptoms.  6.  Recommend referral to psychiatry for anxiety, however patient declines referral at this time.  Patient states that he will follow-up with his primary care provider to discuss treatment for his anxiety.  7.  Patient is not wanting any inhalers at this time.  8.  Patient is advised to follow-up with cardiologist, Dr. Cardenas as scheduled on 2/2/2024.  9.  For seasonal allergies, recommend over-the-counter antihistamine.  Will refer patient to allergist for allergy testing.  10.  Vaccination status:  patient declines flu, pneumonia, and COVID-19 vaccinations.  Discussed with patient the benefits of vaccination including decreased risk of severe illness, hospitalization and death related to flu, pneumonia, and COVID-19.  Patient verbalized understanding.  Patient is advised to continue to follow CDC recommendations such as social distancing, wearing a mask, and washing hands for least 20 seconds.  11.  Smoking status: Patient is a former cigarette smoker. Not eligible for lung cancer screening.   12.  Patient to call the office, 911, or go to the ER with new or worsening symptoms.  13.  Follow-up in 3 to 4 weeks, sooner if needed.          Follow Up   Return for 3-4 weeks with Dr. Murillo.  Patient was given instructions and counseling regarding his condition or for health maintenance advice. Please see specific information pulled into the AVS if appropriate.

## 2024-01-21 ENCOUNTER — HOSPITAL ENCOUNTER (EMERGENCY)
Facility: HOSPITAL | Age: 39
Discharge: HOME | End: 2024-01-22
Attending: EMERGENCY MEDICINE | Admitting: EMERGENCY MEDICINE
Payer: MEDICAID

## 2024-01-21 ENCOUNTER — APPOINTMENT (OUTPATIENT)
Dept: GENERAL RADIOLOGY | Facility: HOSPITAL | Age: 39
End: 2024-01-21
Payer: MEDICAID

## 2024-01-21 DIAGNOSIS — R07.9 CHEST PAIN, UNSPECIFIED TYPE: Primary | ICD-10-CM

## 2024-01-21 LAB
BASOPHILS # BLD AUTO: 0.05 10*3/MM3 (ref 0–0.2)
BASOPHILS NFR BLD AUTO: 0.4 % (ref 0–1.5)
DEPRECATED RDW RBC AUTO: 38.7 FL (ref 37–54)
EOSINOPHIL # BLD AUTO: 0.05 10*3/MM3 (ref 0–0.4)
EOSINOPHIL NFR BLD AUTO: 0.4 % (ref 0.3–6.2)
ERYTHROCYTE [DISTWIDTH] IN BLOOD BY AUTOMATED COUNT: 12.3 % (ref 12.3–15.4)
HCT VFR BLD AUTO: 42.4 % (ref 37.5–51)
HGB BLD-MCNC: 14.6 G/DL (ref 13–17.7)
IMM GRANULOCYTES # BLD AUTO: 0.05 10*3/MM3 (ref 0–0.05)
IMM GRANULOCYTES NFR BLD AUTO: 0.4 % (ref 0–0.5)
LYMPHOCYTES # BLD AUTO: 1.31 10*3/MM3 (ref 0.7–3.1)
LYMPHOCYTES NFR BLD AUTO: 11.6 % (ref 19.6–45.3)
MCH RBC QN AUTO: 29.7 PG (ref 26.6–33)
MCHC RBC AUTO-ENTMCNC: 34.4 G/DL (ref 31.5–35.7)
MCV RBC AUTO: 86.4 FL (ref 79–97)
MONOCYTES # BLD AUTO: 1.17 10*3/MM3 (ref 0.1–0.9)
MONOCYTES NFR BLD AUTO: 10.3 % (ref 5–12)
NEUTROPHILS NFR BLD AUTO: 76.9 % (ref 42.7–76)
NEUTROPHILS NFR BLD AUTO: 8.69 10*3/MM3 (ref 1.7–7)
NRBC BLD AUTO-RTO: 0 /100 WBC (ref 0–0.2)
PLATELET # BLD AUTO: 159 10*3/MM3 (ref 140–450)
PMV BLD AUTO: 12.9 FL (ref 6–12)
RBC # BLD AUTO: 4.91 10*6/MM3 (ref 4.14–5.8)
WBC NRBC COR # BLD AUTO: 11.32 10*3/MM3 (ref 3.4–10.8)

## 2024-01-21 PROCEDURE — 93005 ELECTROCARDIOGRAM TRACING: CPT | Performed by: EMERGENCY MEDICINE

## 2024-01-21 PROCEDURE — 85025 COMPLETE CBC W/AUTO DIFF WBC: CPT | Performed by: EMERGENCY MEDICINE

## 2024-01-21 PROCEDURE — 71045 X-RAY EXAM CHEST 1 VIEW: CPT

## 2024-01-21 PROCEDURE — 83880 ASSAY OF NATRIURETIC PEPTIDE: CPT | Performed by: EMERGENCY MEDICINE

## 2024-01-21 PROCEDURE — 83690 ASSAY OF LIPASE: CPT | Performed by: EMERGENCY MEDICINE

## 2024-01-21 PROCEDURE — 99285 EMERGENCY DEPT VISIT HI MDM: CPT

## 2024-01-21 PROCEDURE — 80053 COMPREHEN METABOLIC PANEL: CPT | Performed by: EMERGENCY MEDICINE

## 2024-01-21 PROCEDURE — 83735 ASSAY OF MAGNESIUM: CPT | Performed by: EMERGENCY MEDICINE

## 2024-01-21 PROCEDURE — 84484 ASSAY OF TROPONIN QUANT: CPT | Performed by: EMERGENCY MEDICINE

## 2024-01-21 RX ORDER — ASPIRIN 81 MG/1
324 TABLET, CHEWABLE ORAL ONCE
Status: DISCONTINUED | OUTPATIENT
Start: 2024-01-21 | End: 2024-01-22

## 2024-01-21 RX ORDER — SODIUM CHLORIDE 0.9 % (FLUSH) 0.9 %
10 SYRINGE (ML) INJECTION AS NEEDED
Status: DISCONTINUED | OUTPATIENT
Start: 2024-01-21 | End: 2024-01-22 | Stop reason: HOSPADM

## 2024-01-22 ENCOUNTER — APPOINTMENT (OUTPATIENT)
Dept: CT IMAGING | Facility: HOSPITAL | Age: 39
End: 2024-01-22
Payer: MEDICAID

## 2024-01-22 ENCOUNTER — OFFICE VISIT (OUTPATIENT)
Dept: PULMONOLOGY | Facility: CLINIC | Age: 39
End: 2024-01-22
Payer: MEDICAID

## 2024-01-22 VITALS
SYSTOLIC BLOOD PRESSURE: 117 MMHG | BODY MASS INDEX: 21.88 KG/M2 | DIASTOLIC BLOOD PRESSURE: 72 MMHG | WEIGHT: 179.7 LBS | HEIGHT: 76 IN | RESPIRATION RATE: 16 BRPM | HEART RATE: 96 BPM | OXYGEN SATURATION: 97 %

## 2024-01-22 VITALS
HEART RATE: 88 BPM | SYSTOLIC BLOOD PRESSURE: 107 MMHG | WEIGHT: 176.81 LBS | DIASTOLIC BLOOD PRESSURE: 78 MMHG | HEIGHT: 76 IN | TEMPERATURE: 97.4 F | RESPIRATION RATE: 18 BRPM | BODY MASS INDEX: 21.53 KG/M2 | OXYGEN SATURATION: 96 %

## 2024-01-22 DIAGNOSIS — F41.9 ANXIETY DISORDER, UNSPECIFIED TYPE: ICD-10-CM

## 2024-01-22 DIAGNOSIS — R06.00 DYSPNEA, UNSPECIFIED TYPE: ICD-10-CM

## 2024-01-22 DIAGNOSIS — Q87.40 MARFAN SYNDROME: ICD-10-CM

## 2024-01-22 DIAGNOSIS — D72.19 PERIPHERAL EOSINOPHILIA: ICD-10-CM

## 2024-01-22 DIAGNOSIS — F17.201 TOBACCO ABUSE, IN REMISSION: Primary | ICD-10-CM

## 2024-01-22 DIAGNOSIS — J30.2 SEASONAL ALLERGIES: ICD-10-CM

## 2024-01-22 DIAGNOSIS — K20.90 ESOPHAGITIS: ICD-10-CM

## 2024-01-22 DIAGNOSIS — I34.0 MITRAL VALVE INSUFFICIENCY, UNSPECIFIED ETIOLOGY: ICD-10-CM

## 2024-01-22 LAB
ALBUMIN SERPL-MCNC: 4.2 G/DL (ref 3.5–5.2)
ALBUMIN/GLOB SERPL: 1.2 G/DL
ALP SERPL-CCNC: 81 U/L (ref 39–117)
ALT SERPL W P-5'-P-CCNC: 15 U/L (ref 1–41)
ANION GAP SERPL CALCULATED.3IONS-SCNC: 13.3 MMOL/L (ref 5–15)
AST SERPL-CCNC: 13 U/L (ref 1–40)
BILIRUB SERPL-MCNC: 0.9 MG/DL (ref 0–1.2)
BILIRUB UR QL STRIP: NEGATIVE
BUN SERPL-MCNC: 9 MG/DL (ref 6–20)
BUN/CREAT SERPL: 8.7 (ref 7–25)
CALCIUM SPEC-SCNC: 9.3 MG/DL (ref 8.6–10.5)
CHLORIDE SERPL-SCNC: 99 MMOL/L (ref 98–107)
CLARITY UR: CLEAR
CO2 SERPL-SCNC: 24.7 MMOL/L (ref 22–29)
COLOR UR: YELLOW
CREAT SERPL-MCNC: 1.03 MG/DL (ref 0.76–1.27)
EGFRCR SERPLBLD CKD-EPI 2021: 94.2 ML/MIN/1.73
EXHALED NITROUS OXIDE: 17
FLUAV SUBTYP SPEC NAA+PROBE: NOT DETECTED
FLUBV RNA ISLT QL NAA+PROBE: NOT DETECTED
GEN 5 2HR TROPONIN T REFLEX: <6 NG/L
GLOBULIN UR ELPH-MCNC: 3.5 GM/DL
GLUCOSE SERPL-MCNC: 115 MG/DL (ref 65–99)
GLUCOSE UR STRIP-MCNC: NEGATIVE MG/DL
HGB UR QL STRIP.AUTO: NEGATIVE
HOLD SPECIMEN: NORMAL
HOLD SPECIMEN: NORMAL
KETONES UR QL STRIP: NEGATIVE
LEUKOCYTE ESTERASE UR QL STRIP.AUTO: NEGATIVE
LIPASE SERPL-CCNC: 18 U/L (ref 13–60)
MAGNESIUM SERPL-MCNC: 1.9 MG/DL (ref 1.6–2.6)
NITRITE UR QL STRIP: NEGATIVE
NT-PROBNP SERPL-MCNC: 244.2 PG/ML (ref 0–450)
PH UR STRIP.AUTO: 7.5 [PH] (ref 5–8)
POTASSIUM SERPL-SCNC: 3.6 MMOL/L (ref 3.5–5.2)
PROT SERPL-MCNC: 7.7 G/DL (ref 6–8.5)
PROT UR QL STRIP: NEGATIVE
RSV RNA NPH QL NAA+NON-PROBE: NOT DETECTED
SARS-COV-2 RNA RESP QL NAA+PROBE: NOT DETECTED
SODIUM SERPL-SCNC: 137 MMOL/L (ref 136–145)
SP GR UR STRIP: 1.01 (ref 1–1.03)
TROPONIN T DELTA: NORMAL
TROPONIN T SERPL HS-MCNC: 7 NG/L
UROBILINOGEN UR QL STRIP: ABNORMAL
WHOLE BLOOD HOLD COAG: NORMAL
WHOLE BLOOD HOLD SPECIMEN: NORMAL

## 2024-01-22 PROCEDURE — 84484 ASSAY OF TROPONIN QUANT: CPT | Performed by: EMERGENCY MEDICINE

## 2024-01-22 PROCEDURE — 99214 OFFICE O/P EST MOD 30 MIN: CPT | Performed by: NURSE PRACTITIONER

## 2024-01-22 PROCEDURE — 81003 URINALYSIS AUTO W/O SCOPE: CPT | Performed by: EMERGENCY MEDICINE

## 2024-01-22 PROCEDURE — 25510000001 IOPAMIDOL PER 1 ML: Performed by: EMERGENCY MEDICINE

## 2024-01-22 PROCEDURE — 71275 CT ANGIOGRAPHY CHEST: CPT

## 2024-01-22 PROCEDURE — 1160F RVW MEDS BY RX/DR IN RCRD: CPT | Performed by: NURSE PRACTITIONER

## 2024-01-22 PROCEDURE — 1159F MED LIST DOCD IN RCRD: CPT | Performed by: NURSE PRACTITIONER

## 2024-01-22 PROCEDURE — 74174 CTA ABD&PLVS W/CONTRAST: CPT

## 2024-01-22 PROCEDURE — 36415 COLL VENOUS BLD VENIPUNCTURE: CPT

## 2024-01-22 PROCEDURE — 93005 ELECTROCARDIOGRAM TRACING: CPT | Performed by: EMERGENCY MEDICINE

## 2024-01-22 PROCEDURE — 87637 SARSCOV2&INF A&B&RSV AMP PRB: CPT | Performed by: EMERGENCY MEDICINE

## 2024-01-22 PROCEDURE — 95012 NITRIC OXIDE EXP GAS DETER: CPT | Performed by: NURSE PRACTITIONER

## 2024-01-22 RX ORDER — ALUMINA, MAGNESIA, AND SIMETHICONE 2400; 2400; 240 MG/30ML; MG/30ML; MG/30ML
15 SUSPENSION ORAL ONCE
Status: COMPLETED | OUTPATIENT
Start: 2024-01-22 | End: 2024-01-22

## 2024-01-22 RX ADMIN — ALUMINUM HYDROXIDE, MAGNESIUM HYDROXIDE, AND DIMETHICONE 15 ML: 400; 400; 40 SUSPENSION ORAL at 04:53

## 2024-01-22 RX ADMIN — IOPAMIDOL 100 ML: 755 INJECTION, SOLUTION INTRAVENOUS at 01:48

## 2024-01-22 NOTE — ED PROVIDER NOTES
Time: 12:46 AM EST  Date of encounter:  1/21/2024  Independent Historian/Clinical History and Information was obtained by:   Patient    History is limited by: N/A    Chief Complaint: chest pain      History of Present Illness:  Patient is a 38 y.o. year old male who presents to the emergency department for evaluation of Chest pain.  Patient reports pain is worse with breathing.  He states he has a history of Marfan's.  He has had heart surgery before.  He denies any known aneurysm/dissection.  He states that at heart valve replacement.  He also reports generalized fatigue.  He has had a mild cough.  He is also noted some blood in his urine.    HPI    Patient Care Team  Primary Care Provider: Veronica Chu APRN    Past Medical History:     No Known Allergies  Past Medical History:   Diagnosis Date    Asthma     Bicuspid aortic valve     Broken ankle, left, sequela     HX    CHF (congestive heart failure)     Congenital heart disease     Dyspnea on exertion     Heart murmur     Heart valve disease     History of migraine     Hypertension     QUIT TAKING MEDS AGE 19 DUE TO INSURANCE REASONS    Marfan syndrome     Mitral valve prolapse     Scoliosis     TIA (transient ischemic attack)     HX     Past Surgical History:   Procedure Laterality Date    FEMUR SURGERY Right     X2 FOR FRACTURE AGE 11    LYMPH NODE DISSECTION      NECK    MITRAL VALVE REPAIR/REPLACEMENT N/A 2/17/2023    Procedure: JEFFERSON STERNOTOMY MITRAL VALVE REPAIR AND PRP;  Surgeon: Matthew Gann MD;  Location: [facility] CVOR;  Service: Cardiothoracic;  Laterality: N/A;    TRANSESOPHAGEAL ECHOCARDIOGRAM (JEFFERSON) N/A 08/11/2022    Procedure: TRANSESOPHAGEAL ECHOCARDIOGRAM;  Surgeon: ADRIANNA Bernard MD;  Location: [facility] ENDOSCOPY;  Service: Cardiology;  Laterality: N/A;     Family History   Problem Relation Age of Onset    Aortic aneurysm Mother     Heart attack Mother     No Known Problems Father     Malig Hyperthermia Neg Hx        Home Medications:  Prior to  Admission medications    Medication Sig Start Date End Date Taking? Authorizing Provider   Aspirin Low Dose 81 MG EC tablet Take 1 tablet by mouth Daily. 23   Vandana Maxwell MD   folic acid (FOLVITE) 1 MG tablet Take 1 tablet by mouth Daily. 23   Vandana Maxwell MD   hydrOXYzine (ATARAX) 10 MG tablet Take 1 tablet by mouth 3 (Three) Times a Day As Needed for Anxiety. 23   Shala Carr APRN   metoprolol succinate XL (TOPROL-XL) 25 MG 24 hr tablet Take 1 tablet by mouth Daily. 23   Shala Carr APRN   vitamin D (ERGOCALCIFEROL) 1.25 MG (49718 UT) capsule capsule  23   ProviderVandana MD        Social History:   Social History     Tobacco Use    Smoking status: Former     Packs/day: 0.25     Years: 10.00     Additional pack years: 0.00     Total pack years: 2.50     Types: Cigarettes     Start date:      Quit date:      Years since quittin.0     Passive exposure: Past    Smokeless tobacco: Never    Tobacco comments:     MORE OF SOCIAL SMOKER FOR 8 YEARS     Smoked at most 4 cpd    Vaping Use    Vaping Use: Former    Start date: 2020    Quit date: 2023    Substances: Nicotine, Flavoring    Devices: Disposable   Substance Use Topics    Alcohol use: Yes     Comment: rarely    Drug use: Never         Review of Systems:  Review of Systems   Constitutional:  Negative for chills and fever.   HENT:  Negative for congestion, ear pain and sore throat.    Eyes:  Negative for pain.   Respiratory:  Positive for cough and shortness of breath. Negative for chest tightness.    Cardiovascular:  Positive for chest pain.   Gastrointestinal:  Negative for abdominal pain, diarrhea, nausea and vomiting.   Genitourinary:  Negative for flank pain and hematuria.   Musculoskeletal:  Negative for joint swelling.   Skin:  Negative for pallor.   Neurological:  Negative for seizures and headaches.   All other systems reviewed and are negative.       Physical  "Exam:  /78 (BP Location: Right arm, Patient Position: Lying)   Pulse 88   Temp 97.4 °F (36.3 °C) (Oral)   Resp 18   Ht 193 cm (76\")   Wt 80.2 kg (176 lb 12.9 oz)   SpO2 96%   BMI 21.52 kg/m²     Physical Exam  Constitutional:       Appearance: Normal appearance.   HENT:      Head: Normocephalic and atraumatic.      Nose: Nose normal.      Mouth/Throat:      Mouth: Mucous membranes are moist.   Eyes:      Extraocular Movements: Extraocular movements intact.      Conjunctiva/sclera: Conjunctivae normal.      Pupils: Pupils are equal, round, and reactive to light.   Cardiovascular:      Rate and Rhythm: Normal rate and regular rhythm.      Pulses: Normal pulses.      Heart sounds: Normal heart sounds.   Pulmonary:      Effort: Pulmonary effort is normal.      Breath sounds: Normal breath sounds.   Abdominal:      General: There is no distension.      Palpations: Abdomen is soft.      Tenderness: There is no abdominal tenderness.   Musculoskeletal:         General: Normal range of motion.      Cervical back: Normal range of motion.   Skin:     General: Skin is warm and dry.      Capillary Refill: Capillary refill takes less than 2 seconds.   Neurological:      General: No focal deficit present.      Mental Status: He is alert and oriented to person, place, and time. Mental status is at baseline.   Psychiatric:         Mood and Affect: Mood normal.         Behavior: Behavior normal.                  Procedures:  Procedures      Medical Decision Making:      Comorbidities that affect care:    Marfan, Congestive Heart Failure    External Notes reviewed:    Previous Clinic Note: Patient was seen by cardiology on 12/29/2023 for nonrheumatic aortic valve insufficiency, nonrheumatic mitral valve regurgitation, status post mitral valve repair, bicuspid aortic valve.  Complex mitral valve repair with a 42 mm [employer] flexible band and chordal repair of A2 segment, primary closure of patent foramen ovale on February " 17, 2023 with Dr. Gann.    The following orders were placed and all results were independently analyzed by me:  Orders Placed This Encounter   Procedures    COVID-19, FLU A/B, RSV PCR 1 HR TAT - Swab, Nasopharynx    XR Chest 1 View    CT Angiogram Chest    CT Angiogram Abdomen Pelvis    [place] Draw    High Sensitivity Troponin T    Comprehensive Metabolic Panel    Lipase    BNP    Magnesium    CBC Auto Differential    High Sensitivity Troponin T 2Hr    Urinalysis With Culture If Indicated - Urine, Clean Catch    NPO Diet NPO Type: Strict NPO    Undress & Gown    Continuous Pulse Oximetry    Oxygen Therapy- Nasal Cannula; Titrate 1-6 LPM Per SpO2; 90 - 95%    ECG 12 Lead ED Triage Standing Order; Chest Pain    ECG 12 Lead ED Triage Standing Order; Chest Pain    Insert Peripheral IV    CBC & Differential    Green Top (Gel)    Lavender Top    Gold Top - SST    Light Blue Top       Medications Given in the Emergency Department:  Medications   sodium chloride 0.9 % flush 10 mL (has no administration in time range)   aluminum-magnesium hydroxide-simethicone (MAALOX MAX) 400-400-40 MG/5ML suspension 15 mL (has no administration in time range)   iopamidol (ISOVUE-370) 76 % injection 100 mL (100 mL Intravenous Given 1/22/24 0148)        ED Course:    ED Course as of 01/22/24 0440   Mon Jan 22, 2024   0437 ECG 12 Lead ED Triage Standing Order; Chest Pain  Sinus tachycardia with rate of 108.  Bilateral atrial enlargement, RVH, normal NM and QTc.  No acute ST elevation.  EKG interpreted by me. [LD]   0438 ECG 12 Lead ED Triage Standing Order; Chest Pain  Sinus rhythm rate 92.  Normal NM and QTc.  No acute ST elevation.  Nonspecific T wave abnormalities.  EKG interpreted by me. [LD]      ED Course User Index  [LD] Paige Pierson MD       Labs:    Lab Results (last 24 hours)       Procedure Component Value Units Date/Time    High Sensitivity Troponin T [[id number]]  (Normal) Collected: 01/21/24 2304    Specimen: Blood  Updated: 01/22/24 0008     HS Troponin T 7 ng/L     Narrative:      High Sensitive Troponin T Reference Range:  <14.0 ng/L- Negative Female for AMI  <22.0 ng/L- Negative Male for AMI  >=14 - Abnormal Female indicating possible myocardial injury.  >=22 - Abnormal Male indicating possible myocardial injury.   Clinicians would have to utilize clinical acumen, EKG, Troponin, and serial changes to determine if it is an Acute Myocardial Infarction or myocardial injury due to an underlying chronic condition.         CBC & Differential [[id number]]  (Abnormal) Collected: 01/21/24 2304    Specimen: Blood Updated: 01/21/24 2330    Narrative:      The following orders were created for panel order CBC & Differential.  Procedure                               Abnormality         Status                     ---------                               -----------         ------                     CBC Auto Differential[[id number]]        Abnormal            Final result                 Please view results for these tests on the individual orders.    Comprehensive Metabolic Panel [[id number]]  (Abnormal) Collected: 01/21/24 2304    Specimen: Blood Updated: 01/22/24 0008     Glucose 115 mg/dL      BUN 9 mg/dL      Creatinine 1.03 mg/dL      Sodium 137 mmol/L      Potassium 3.6 mmol/L      Chloride 99 mmol/L      CO2 24.7 mmol/L      Calcium 9.3 mg/dL      Total Protein 7.7 g/dL      Albumin 4.2 g/dL      ALT (SGPT) 15 U/L      AST (SGOT) 13 U/L      Alkaline Phosphatase 81 U/L      Total Bilirubin 0.9 mg/dL      Globulin 3.5 gm/dL      A/G Ratio 1.2 g/dL      BUN/Creatinine Ratio 8.7     Anion Gap 13.3 mmol/L      eGFR 94.2 mL/min/1.73     Narrative:      GFR Normal >60  Chronic Kidney Disease <60  Kidney Failure <15      Lipase [[id number]]  (Normal) Collected: 01/21/24 2304    Specimen: Blood Updated: 01/22/24 0008     Lipase 18 U/L     BNP [[id number]]  (Normal) Collected: 01/21/24 2304    Specimen: Blood Updated: 01/22/24 0002     proBNP  244.2 pg/mL     Narrative:      This assay is used as an aid in the diagnosis of individuals suspected of having heart failure. It can be used as an aid in the diagnosis of acute decompensated heart failure (ADHF) in patients presenting with signs and symptoms of ADHF to the emergency department (ED). In addition, NT-proBNP of <300 pg/mL indicates ADHF is not likely.    Age Range Result Interpretation  NT-proBNP Concentration (pg/mL:      <50             Positive            >450                   Gray                 300-450                    Negative             <300    50-75           Positive            >900                  Gray                300-900                  Negative            <300      >75             Positive            >1800                  Gray                300-1800                  Negative            <300    Magnesium [[id number]]  (Normal) Collected: 01/21/24 2304    Specimen: Blood Updated: 01/22/24 0008     Magnesium 1.9 mg/dL     CBC Auto Differential [[id number]]  (Abnormal) Collected: 01/21/24 2304    Specimen: Blood Updated: 01/21/24 2330     WBC 11.32 10*3/mm3      RBC 4.91 10*6/mm3      Hemoglobin 14.6 g/dL      Hematocrit 42.4 %      MCV 86.4 fL      MCH 29.7 pg      MCHC 34.4 g/dL      RDW 12.3 %      RDW-SD 38.7 fl      MPV 12.9 fL      Platelets 159 10*3/mm3      Neutrophil % 76.9 %      Lymphocyte % 11.6 %      Monocyte % 10.3 %      Eosinophil % 0.4 %      Basophil % 0.4 %      Immature Grans % 0.4 %      Neutrophils, Absolute 8.69 10*3/mm3      Lymphocytes, Absolute 1.31 10*3/mm3      Monocytes, Absolute 1.17 10*3/mm3      Eosinophils, Absolute 0.05 10*3/mm3      Basophils, Absolute 0.05 10*3/mm3      Immature Grans, Absolute 0.05 10*3/mm3      nRBC 0.0 /100 WBC     COVID-19, FLU A/B, RSV PCR 1 HR TAT - Swab, Nasopharynx [[id number]]  (Normal) Collected: 01/22/24 0027    Specimen: Swab from Nasopharynx Updated: 01/22/24 0108     COVID19 Not Detected     Influenza A PCR Not  Detected     Influenza B PCR Not Detected     RSV, PCR Not Detected    Narrative:      Fact sheet for providers: https://www.fda.gov/media/691527/download    Fact sheet for patients: https://www.fda.gov/media/932659/download    Test performed by PCR.    High Sensitivity Troponin T 2Hr [[id number]] Collected: 01/22/24 0100    Specimen: Blood Updated: 01/22/24 0126     HS Troponin T <6 ng/L      Troponin T Delta --     Comment: Unable to calculate.       Narrative:      High Sensitive Troponin T Reference Range:  <14.0 ng/L- Negative Female for AMI  <22.0 ng/L- Negative Male for AMI  >=14 - Abnormal Female indicating possible myocardial injury.  >=22 - Abnormal Male indicating possible myocardial injury.   Clinicians would have to utilize clinical acumen, EKG, Troponin, and serial changes to determine if it is an Acute Myocardial Infarction or myocardial injury due to an underlying chronic condition.         Urinalysis With Culture If Indicated - Urine, Clean Catch [[id number]]  (Abnormal) Collected: 01/22/24 0130    Specimen: Urine, Clean Catch Updated: 01/22/24 0150     Color, UA Yellow     Appearance, UA Clear     pH, UA 7.5     Specific Gravity, UA 1.010     Glucose, UA Negative     Ketones, UA Negative     Bilirubin, UA Negative     Blood, UA Negative     Protein, UA Negative     Leuk Esterase, UA Negative     Nitrite, UA Negative     Urobilinogen, UA 4.0 E.U./dL    Narrative:      In absence of clinical symptoms, the presence of pyuria, bacteria, and/or nitrites on the urinalysis result does not correlate with infection.  Urine microscopic not indicated.             Imaging:    CT Angiogram Abdomen Pelvis    Result Date: 1/22/2024  PROCEDURE: CT ANGIOGRAM ABDOMEN PELVIS W WO CONTRAST  COMPARISON: [facility], CT, ABDOMEN/PELVIS WITH CONTRAST, 8/13/2019, 2:19.  [facility], CT, CT CHEST W CONTRAST DIAGNOSTIC, 5/03/2023, 1:21.  INDICATIONS: Abdominal pain  PROTOCOL:   Standard imaging  protocol performed    RADIATION:   DLP: 1175.7 mGy*cm   Automated exposure control was utilized to minimize radiation dose. CONTRAST: 80 cc Isovue 370 I.V.  TECHNIQUE: After obtaining the patient's consent, CT images of the abdomen and pelvis were created without and/or with non-ionic intravenous contrast, and with multi-planar/3-D imaging to optimize visualization of vascular anatomy.   FINDINGS:  In the portal venous phase, as the CT tube overheated prior to scanning in the arterial phase.  This exam is diagnostic to exclude abdominal aortic aneurysm and dissection.  CT chest was dictated separately.  No evidence of abdominal aortic or iliac aneurysm or dissection.  Punctate calculus in the lower left kidney measures 2 mm.  Right kidney, adrenal glands, spleen, pancreas, gallbladder appear unremarkable.  Subcentimeter hypodense liver lesions are too small to characterize, but likely cysts.  Wall thickening of the distal esophagus is partially included in the lower chest.  No abnormal small bowel distention is seen.  No periappendiceal inflammation is noted.  Urinary bladder and prostate gland appear unremarkable.  No significant free fluid is seen.  No acute osseous abnormality is identified.         1. No evidence of abdominal aortic aneurysm or dissection. 2. Nonobstructing left nephrolithiasis.     LUCINDA VALENZUELA MD       Electronically Signed and Approved By: LUCINDA VALENZUELA MD on 1/22/2024 at 4:17             CT Angiogram Chest    Result Date: 1/22/2024  PROCEDURE: CT ANGIOGRAM CHEST  COMPARISON: [facility], CR, XR CHEST 1 VW, 1/21/2024, 23:29.  CT, CT ANGIOGRAM ABDOMEN PELVIS, 1/22/2024, 1:38.  [facility], CT, CT CHEST W CONTRAST DIAGNOSTIC, 5/03/2023, 1:21.  INDICATIONS: chest pain  TECHNIQUE: After obtaining the patient's consent, CT images were obtained without and with non-ionic intravenous contrast material.  Multi-planar/3-D images were created to optimize visualization  of vascular anatomy.   PROTOCOL:   Standard imaging protocol performed    RADIATION:   DLP: 1175.7 mGy*cm   Automated exposure control was utilized to minimize radiation dose. CONTRAST: 80 cc Isovue 370 I.V.  FINDINGS:  There are postoperative changes from median sternotomy and mitral valve repair.  There is no evidence of thoracic aortic aneurysm or dissection.1 no central pulmonary embolism is seen.  Heart size is normal.  No pericardial or pleural effusion is seen.  Lungs are clear.  Wall thickening of the mid to distal esophagus was not seen on 5/3/2023 CT.  No acute osseous abnormality is identified.  CT abdomen/pelvis will be dictated separately.        1. No evidence of thoracic aortic aneurysm or dissection. 2. Circumferential wall thickening of the mid to distal esophagus, which is nonspecific, but can be seen with esophagitis.  Consider correlation with endoscopy.     LUCINDA VALENZUELA MD       Electronically Signed and Approved By: LUCINDA VALENZUELA MD on 1/22/2024 at 4:11             XR Chest 1 View    Result Date: 1/21/2024  PROCEDURE: XR CHEST 1 VW  COMPARISON: [facility], CR, XR CHEST 1 VW, 5/12/2023, 9:43.  [facility], CT, CT CHEST W CONTRAST DIAGNOSTIC, 5/03/2023, 1:21.  INDICATIONS: Chest Pain Triage Protocol  FINDINGS:  Lungs are well expanded and appear clear.  No pneumothorax or large pleural effusion is seen.  Heart size is normal.  Sternal wires are again noted.         No acute cardiopulmonary abnormality is identified.       LUCINDA VALENZUELA MD       Electronically Signed and Approved By: LUCINDA VALENZUELA MD on 1/21/2024 at 23:31                Differential Diagnosis and Discussion:    Chest Pain:  Based on the patient's signs and symptoms, I considered aortic dissection, myocardial infaction, pulmonary embolism, cardiac tamponade, pericarditis, pneumothorax, musculoskeletal chest pain and other differential diagnosis as an etiology of the patient's chest pain.    Cough: Differential diagnosis includes but is not limited to pneumonia, acute bronchitis, upper respiratory infection, ACE inhibitor use, allergic reaction, epiglottitis, seasonal allergies, chemical irritants, exercise-induced asthma, viral syndrome.  Dyspnea: Differential diagnosis includes but is not limited to metabolic acidosis, neurological disorders, psychogenic, asthma, pneumothorax, upper airway obstruction, COPD, pneumonia, noncardiogenic pulmonary edema, interstitial lung disease, anemia, congestive heart failure, and pulmonary embolism    All labs were reviewed and interpreted by me.  All X-rays impressions were independently interpreted by me.  EKG was interpreted by me.  CT scan radiology impression was interpreted by me.    MDM  Number of Diagnoses or Management Options  Diagnosis management comments: Patient is afebrile nontoxic-appearing.  Vital signs are stable.  At time of evaluation is lying in bed in no acute distress.  Labs were obtained and showed no significant abnormality.  2 sets of troponins were both negative.  Patient reports history of Marfan's.  CTA of chest abdomen pelvis was obtained that showed no evidence of thoracic aortic dissection or or aneurysm no abdominal aortic aneurysm or dissection.  Patient does have findings suggestive of possible esophagitis I discussed this all with the patient.  Recommend close follow-up with his primary physician and surgeon.  Discussed return precautions, discharge instructions and answered all his questions.       Amount and/or Complexity of Data Reviewed  Clinical lab tests: reviewed  Tests in the radiology section of CPT®: reviewed  Review and summarize past medical records: yes  Independent visualization of images, tracings, or specimens: yes    Risk of Complications, Morbidity, and/or Mortality  Presenting problems: moderate  Management options: moderate                 Patient Care Considerations:    NARCOTICS: I considered prescribing  opiate pain medication as an outpatient, however not indicated at this time      Consultants/Shared Management Plan:    None    Social Determinants of Health:    Patient is independent, reliable, and has access to care.       Disposition and Care Coordination:    Discharged: The patient is suitable and stable for discharge with no need for consideration of admission.        Final diagnoses:   Chest pain, unspecified type        ED Disposition       ED Disposition   Discharge    Condition   Stable    Comment   --               This medical record created using voice recognition software.             Paige Pierson MD  01/22/24 3405

## 2024-01-27 LAB
QT INTERVAL: 336 MS
QT INTERVAL: 352 MS
QTC INTERVAL: 415 MS
QTC INTERVAL: 474 MS

## 2024-02-01 ENCOUNTER — HOSPITAL ENCOUNTER (EMERGENCY)
Facility: HOSPITAL | Age: 39
Discharge: LEFT BEFORE DISCHARGE | End: 2024-02-01
Payer: MEDICAID

## 2024-02-01 ENCOUNTER — APPOINTMENT (OUTPATIENT)
Dept: GENERAL RADIOLOGY | Facility: HOSPITAL | Age: 39
End: 2024-02-01
Payer: MEDICAID

## 2024-02-01 VITALS
HEART RATE: 87 BPM | WEIGHT: 180.56 LBS | HEIGHT: 78 IN | DIASTOLIC BLOOD PRESSURE: 91 MMHG | RESPIRATION RATE: 20 BRPM | SYSTOLIC BLOOD PRESSURE: 117 MMHG | BODY MASS INDEX: 20.89 KG/M2 | OXYGEN SATURATION: 98 % | TEMPERATURE: 98.5 F

## 2024-02-01 LAB
ALBUMIN SERPL-MCNC: 4.3 G/DL (ref 3.5–5.2)
ALBUMIN/GLOB SERPL: 1.3 G/DL
ALP SERPL-CCNC: 82 U/L (ref 39–117)
ALT SERPL W P-5'-P-CCNC: 11 U/L (ref 1–41)
ANION GAP SERPL CALCULATED.3IONS-SCNC: 13.5 MMOL/L (ref 5–15)
AST SERPL-CCNC: 14 U/L (ref 1–40)
BASOPHILS # BLD AUTO: 0.06 10*3/MM3 (ref 0–0.2)
BASOPHILS NFR BLD AUTO: 0.8 % (ref 0–1.5)
BILIRUB SERPL-MCNC: 0.3 MG/DL (ref 0–1.2)
BUN SERPL-MCNC: 8 MG/DL (ref 6–20)
BUN/CREAT SERPL: 7.5 (ref 7–25)
CALCIUM SPEC-SCNC: 9.3 MG/DL (ref 8.6–10.5)
CHLORIDE SERPL-SCNC: 105 MMOL/L (ref 98–107)
CO2 SERPL-SCNC: 22.5 MMOL/L (ref 22–29)
CREAT SERPL-MCNC: 1.06 MG/DL (ref 0.76–1.27)
DEPRECATED RDW RBC AUTO: 40.2 FL (ref 37–54)
EGFRCR SERPLBLD CKD-EPI 2021: 91 ML/MIN/1.73
EOSINOPHIL # BLD AUTO: 0.23 10*3/MM3 (ref 0–0.4)
EOSINOPHIL NFR BLD AUTO: 3 % (ref 0.3–6.2)
ERYTHROCYTE [DISTWIDTH] IN BLOOD BY AUTOMATED COUNT: 12.8 % (ref 12.3–15.4)
GEN 5 2HR TROPONIN T REFLEX: 7 NG/L
GLOBULIN UR ELPH-MCNC: 3.2 GM/DL
GLUCOSE SERPL-MCNC: 95 MG/DL (ref 65–99)
HCT VFR BLD AUTO: 47.6 % (ref 37.5–51)
HGB BLD-MCNC: 16.2 G/DL (ref 13–17.7)
HOLD SPECIMEN: NORMAL
HOLD SPECIMEN: NORMAL
IMM GRANULOCYTES # BLD AUTO: 0.03 10*3/MM3 (ref 0–0.05)
IMM GRANULOCYTES NFR BLD AUTO: 0.4 % (ref 0–0.5)
LIPASE SERPL-CCNC: 29 U/L (ref 13–60)
LYMPHOCYTES # BLD AUTO: 2.27 10*3/MM3 (ref 0.7–3.1)
LYMPHOCYTES NFR BLD AUTO: 29.8 % (ref 19.6–45.3)
MAGNESIUM SERPL-MCNC: 2 MG/DL (ref 1.6–2.6)
MCH RBC QN AUTO: 29.7 PG (ref 26.6–33)
MCHC RBC AUTO-ENTMCNC: 34 G/DL (ref 31.5–35.7)
MCV RBC AUTO: 87.3 FL (ref 79–97)
MONOCYTES # BLD AUTO: 0.58 10*3/MM3 (ref 0.1–0.9)
MONOCYTES NFR BLD AUTO: 7.6 % (ref 5–12)
NEUTROPHILS NFR BLD AUTO: 4.44 10*3/MM3 (ref 1.7–7)
NEUTROPHILS NFR BLD AUTO: 58.4 % (ref 42.7–76)
NRBC BLD AUTO-RTO: 0 /100 WBC (ref 0–0.2)
NT-PROBNP SERPL-MCNC: 71.7 PG/ML (ref 0–450)
PLATELET # BLD AUTO: 222 10*3/MM3 (ref 140–450)
PMV BLD AUTO: 12.7 FL (ref 6–12)
POTASSIUM SERPL-SCNC: 3.7 MMOL/L (ref 3.5–5.2)
PROT SERPL-MCNC: 7.5 G/DL (ref 6–8.5)
QT INTERVAL: 361 MS
QT INTERVAL: 364 MS
QTC INTERVAL: 452 MS
QTC INTERVAL: 456 MS
RBC # BLD AUTO: 5.45 10*6/MM3 (ref 4.14–5.8)
SODIUM SERPL-SCNC: 141 MMOL/L (ref 136–145)
TROPONIN T DELTA: 0 NG/L
TROPONIN T SERPL HS-MCNC: 7 NG/L
WBC NRBC COR # BLD AUTO: 7.61 10*3/MM3 (ref 3.4–10.8)
WHOLE BLOOD HOLD COAG: NORMAL
WHOLE BLOOD HOLD SPECIMEN: NORMAL

## 2024-02-01 PROCEDURE — 84484 ASSAY OF TROPONIN QUANT: CPT

## 2024-02-01 PROCEDURE — 99211 OFF/OP EST MAY X REQ PHY/QHP: CPT

## 2024-02-01 PROCEDURE — 83690 ASSAY OF LIPASE: CPT

## 2024-02-01 PROCEDURE — 83735 ASSAY OF MAGNESIUM: CPT

## 2024-02-01 PROCEDURE — 85025 COMPLETE CBC W/AUTO DIFF WBC: CPT

## 2024-02-01 PROCEDURE — 83880 ASSAY OF NATRIURETIC PEPTIDE: CPT

## 2024-02-01 PROCEDURE — 93010 ELECTROCARDIOGRAM REPORT: CPT | Performed by: INTERNAL MEDICINE

## 2024-02-01 PROCEDURE — 80053 COMPREHEN METABOLIC PANEL: CPT

## 2024-02-01 PROCEDURE — 71045 X-RAY EXAM CHEST 1 VIEW: CPT

## 2024-02-01 PROCEDURE — 93005 ELECTROCARDIOGRAM TRACING: CPT

## 2024-02-01 RX ORDER — ASPIRIN 81 MG/1
324 TABLET, CHEWABLE ORAL ONCE
Status: DISCONTINUED | OUTPATIENT
Start: 2024-02-01 | End: 2024-02-01 | Stop reason: HOSPADM

## 2024-02-01 RX ORDER — SODIUM CHLORIDE 0.9 % (FLUSH) 0.9 %
10 SYRINGE (ML) INJECTION AS NEEDED
Status: DISCONTINUED | OUTPATIENT
Start: 2024-02-01 | End: 2024-02-01 | Stop reason: HOSPADM

## 2024-02-02 ENCOUNTER — OFFICE VISIT (OUTPATIENT)
Dept: CARDIOLOGY | Facility: CLINIC | Age: 39
End: 2024-02-02
Payer: MEDICAID

## 2024-02-02 VITALS
BODY MASS INDEX: 20.36 KG/M2 | DIASTOLIC BLOOD PRESSURE: 76 MMHG | HEIGHT: 78 IN | WEIGHT: 176 LBS | SYSTOLIC BLOOD PRESSURE: 113 MMHG | HEART RATE: 89 BPM

## 2024-02-02 DIAGNOSIS — K20.90 ESOPHAGITIS: ICD-10-CM

## 2024-02-02 DIAGNOSIS — Q23.1 BICUSPID AORTIC VALVE: ICD-10-CM

## 2024-02-02 DIAGNOSIS — I34.0 SEVERE MITRAL REGURGITATION: Primary | ICD-10-CM

## 2024-02-02 PROCEDURE — 99214 OFFICE O/P EST MOD 30 MIN: CPT | Performed by: INTERNAL MEDICINE

## 2024-02-02 RX ORDER — PANTOPRAZOLE SODIUM 40 MG/1
40 TABLET, DELAYED RELEASE ORAL DAILY
Qty: 30 TABLET | Refills: 6 | Status: SHIPPED | OUTPATIENT
Start: 2024-02-02

## 2024-02-02 RX ORDER — PROPRANOLOL HYDROCHLORIDE 10 MG/1
10 TABLET ORAL 2 TIMES DAILY
Qty: 60 TABLET | Refills: 6 | Status: SHIPPED | OUTPATIENT
Start: 2024-02-02

## 2024-02-02 NOTE — PROGRESS NOTES
Chief Complaint  Nonrheumatic aortic valve insufficiency, Dizziness, <9>VHD (valvular heart disease), and Hospital Follow Up Visit    Subjective        Regino Fox presents to [facility] CARDIOLOGY  History of present illness:    Patient states that he went to the emergency department in January.  He was apparently having chest pain at the time.  It was per the notes worse with breathing.  He also notes that he feels like his anxiety is worse than he had thought.  He never took the Lexapro.  He also did not take the hydralazine as he thought it was just used for itching.  In the emergency department he had CT scan of the chest and abdomen that showed no aneurysm or dissection.  It did show thickening of the mid to distal esophagus consistent with likely esophagitis.  He has set up an appointment with a GI doctor.  He also has set up an appointment with a psychiatrist.  He did not tolerate the atenolol with sexual side effects or the Toprol.      Past Medical History:   Diagnosis Date    Asthma     Bicuspid aortic valve     Broken ankle, left, sequela     HX    CHF (congestive heart failure)     Congenital heart disease     Dyspnea on exertion     Heart murmur     Heart valve disease     History of migraine     Hypertension     QUIT TAKING MEDS AGE 19 DUE TO INSURANCE REASONS    Marfan syndrome     Mitral valve prolapse     Scoliosis     TIA (transient ischemic attack)     HX         Past Surgical History:   Procedure Laterality Date    FEMUR SURGERY Right     X2 FOR FRACTURE AGE 11    LYMPH NODE DISSECTION      NECK    MITRAL VALVE REPAIR/REPLACEMENT N/A 2/17/2023    Procedure: JEFFERSON STERNOTOMY MITRAL VALVE REPAIR AND PRP;  Surgeon: Matthew Gann MD;  Location: [facility] CVOR;  Service: Cardiothoracic;  Laterality: N/A;    TRANSESOPHAGEAL ECHOCARDIOGRAM (JEFFERSON) N/A 08/11/2022    Procedure: TRANSESOPHAGEAL ECHOCARDIOGRAM;  Surgeon: ADRIANNA Bernard MD;  Location: [facility] ENDOSCOPY;  Service:  "Cardiology;  Laterality: N/A;          Social History     Socioeconomic History    Marital status: Single   Tobacco Use    Smoking status: Every Day     Packs/day: 0.25     Years: 10.00     Additional pack years: 0.00     Total pack years: 2.50     Types: Cigarettes     Start date: 2010     Passive exposure: Past    Smokeless tobacco: Never    Tobacco comments:     MORE OF SOCIAL SMOKER FOR 8 YEARS     Smoked at most 4 cpd    Vaping Use    Vaping Use: Former    Start date: 2/1/2020    Quit date: 2/1/2023    Substances: Nicotine, Flavoring    Devices: Disposable   Substance and Sexual Activity    Alcohol use: Yes     Comment: rarely    Drug use: Never    Sexual activity: Defer         Family History   Problem Relation Age of Onset    Aortic aneurysm Mother     Heart attack Mother     No Known Problems Father     Malig Hyperthermia Neg Hx           No Known Allergies         Current Outpatient Medications:     Aspirin Low Dose 81 MG EC tablet, Take 1 tablet by mouth Daily., Disp: , Rfl:     folic acid (FOLVITE) 1 MG tablet, Take 1 tablet by mouth Daily., Disp: , Rfl:     vitamin D (ERGOCALCIFEROL) 1.25 MG (56631 UT) capsule capsule, , Disp: , Rfl:     pantoprazole (Protonix) 40 MG EC tablet, Take 1 tablet by mouth Daily., Disp: 30 tablet, Rfl: 6    propranolol (INDERAL) 10 MG tablet, Take 1 tablet by mouth 2 (Two) Times a Day., Disp: 60 tablet, Rfl: 6      ROS:  Cardiac review of systems positive for anxiety.    Objective     /76   Pulse 89   Ht 198.1 cm (78\")   Wt 79.8 kg (176 lb)   BMI 20.34 kg/m²       General Appearance:   well developed  well nourished  HENT:   oropharynx moist  lips not cyanotic  Respiratory:  no respiratory distress  normal breath sounds  no rales  Cardiovascular:  no jugular venous distention  regular rhythm  S1 normal, S2 normal  no S3, no S4   no murmur  no rub, no thrill  No carotid bruit  pedal pulses normal  lower extremity edema: none    Musculoskeletal:  no clubbing of " fingers.   normocephalic, head atraumatic  Skin:   warm, dry  Psychiatric:  judgement and insight appropriate  normal mood and affect    ECHO:  Results for orders placed during the hospital encounter of 05/05/23    Adult Transthoracic Echo Complete W/ Cont if Necessary Per Protocol    Interpretation Summary    Left ventricular ejection fraction appears to be 56 - 60%.    Left ventricular wall thickness is consistent with borderline concentric hypertrophy.    Left ventricular diastolic function was normal.    Aortic valve is not well visualized but is thought to be bicuspid.  There is mild aortic insufficiency.  No significant aortic stenosis.    Status post mitral valve repair with no significant gradients and mild mitral regurgitation.    STRESS:  Results for orders placed during the hospital encounter of 09/14/22    Stress Test With Myocardial Perfusion One Day    Interpretation Summary  · Left ventricular ejection fraction is normal. (Calculated EF = 53%).  · Myocardial perfusion imaging indicates a normal myocardial perfusion study with no evidence of ischemia.  · Impressions are consistent with a low risk study.  · Findings consistent with a normal ECG stress test.    CATH:  No results found for this or any previous visit.    BMP:     Glucose   Date Value Ref Range Status   02/01/2024 95 65 - 99 mg/dL Final     BUN   Date Value Ref Range Status   02/01/2024 8 6 - 20 mg/dL Final     Creatinine   Date Value Ref Range Status   02/01/2024 1.06 0.76 - 1.27 mg/dL Final     Sodium   Date Value Ref Range Status   02/01/2024 141 136 - 145 mmol/L Final     Potassium   Date Value Ref Range Status   02/01/2024 3.7 3.5 - 5.2 mmol/L Final     Chloride   Date Value Ref Range Status   02/01/2024 105 98 - 107 mmol/L Final     CO2   Date Value Ref Range Status   02/01/2024 22.5 22.0 - 29.0 mmol/L Final     Calcium   Date Value Ref Range Status   02/01/2024 9.3 8.6 - 10.5 mg/dL Final     BUN/Creatinine Ratio   Date Value Ref  Range Status   02/01/2024 7.5 7.0 - 25.0 Final     Anion Gap   Date Value Ref Range Status   02/01/2024 13.5 5.0 - 15.0 mmol/L Final     eGFR   Date Value Ref Range Status   02/01/2024 91.0 >60.0 mL/min/1.73 Final     LIPIDS:  Total Cholesterol   Date Value Ref Range Status   02/15/2023 146 0 - 200 mg/dL Final     Triglycerides   Date Value Ref Range Status   02/15/2023 71 0 - 150 mg/dL Final     HDL Cholesterol   Date Value Ref Range Status   02/15/2023 47 40 - 60 mg/dL Final     LDL Cholesterol    Date Value Ref Range Status   02/15/2023 85 0 - 100 mg/dL Final     VLDL Cholesterol   Date Value Ref Range Status   02/15/2023 14 5 - 40 mg/dL Final     LDL/HDL Ratio   Date Value Ref Range Status   02/15/2023 1.80  Final         Procedures             ASSESSMENT:  Diagnoses and all orders for this visit:    1. Severe mitral regurgitation (Primary)    2. Bicuspid aortic valve    3. Esophagitis    Other orders  -     propranolol (INDERAL) 10 MG tablet; Take 1 tablet by mouth 2 (Two) Times a Day.  Dispense: 60 tablet; Refill: 6  -     pantoprazole (Protonix) 40 MG EC tablet; Take 1 tablet by mouth Daily.  Dispense: 30 tablet; Refill: 6         PLAN:    1.  Reviewed patient's emergency department visit which showed 2 sets of negative troponins, normal chest x-ray and EKG showing normal sinus rhythm.  He had CT of the chest and abdomen that showed no aneurysm or dissection.  It did show thickening of the mid to distal esophagus consistent with esophagitis.  I have prescribed Protonix and he has a GI appointment.  2.  Patient did not tolerate atenolol or metoprolol.  I asked him to try propranolol low-dose to see if this will also help with his anxiety.  He has set up an appointment with a psychiatrist also.  If he has problems with this medication he will let us know.  3.  Blood pressures under good control.  4.  Patient had echocardiogram 5/5/2023 with normal ejection fraction, bicuspid aortic valve with mild aortic  insufficiency but no aortic stenosis.  His mitral valve repair looked great.  We might just repeat this 5/2025.      Return in about 3 months (around 5/2/2024) for torin wu.     Patient was given instructions and counseling regarding his condition or for health maintenance advice. Please see specific information pulled into the AVS if appropriate.         Eliazar Phipps MD   2/2/2024  16:03 EST

## 2024-02-13 ENCOUNTER — TELEPHONE (OUTPATIENT)
Dept: GASTROENTEROLOGY | Facility: CLINIC | Age: 39
End: 2024-02-13
Payer: MEDICAID

## 2024-02-15 ENCOUNTER — OFFICE VISIT (OUTPATIENT)
Dept: PULMONOLOGY | Facility: CLINIC | Age: 39
End: 2024-02-15
Payer: MEDICAID

## 2024-02-15 VITALS
DIASTOLIC BLOOD PRESSURE: 82 MMHG | RESPIRATION RATE: 16 BRPM | HEIGHT: 78 IN | OXYGEN SATURATION: 97 % | WEIGHT: 177 LBS | SYSTOLIC BLOOD PRESSURE: 113 MMHG | HEART RATE: 97 BPM | BODY MASS INDEX: 20.48 KG/M2

## 2024-02-15 DIAGNOSIS — R59.0 HILAR ADENOPATHY: ICD-10-CM

## 2024-02-15 DIAGNOSIS — J90 PLEURAL EFFUSION: Primary | ICD-10-CM

## 2024-02-15 DIAGNOSIS — R06.09 DOE (DYSPNEA ON EXERTION): ICD-10-CM

## 2024-02-15 DIAGNOSIS — Z72.0 TOBACCO ABUSE: ICD-10-CM

## 2024-05-24 ENCOUNTER — HOSPITAL ENCOUNTER (EMERGENCY)
Facility: HOSPITAL | Age: 39
Discharge: HOME | End: 2024-05-24
Attending: EMERGENCY MEDICINE
Payer: MEDICAID

## 2024-05-24 ENCOUNTER — APPOINTMENT (OUTPATIENT)
Dept: CT IMAGING | Facility: HOSPITAL | Age: 39
End: 2024-05-24
Payer: MEDICAID

## 2024-05-24 VITALS
HEART RATE: 86 BPM | OXYGEN SATURATION: 97 % | WEIGHT: 186.51 LBS | SYSTOLIC BLOOD PRESSURE: 121 MMHG | BODY MASS INDEX: 21.58 KG/M2 | HEIGHT: 78 IN | TEMPERATURE: 97.9 F | DIASTOLIC BLOOD PRESSURE: 81 MMHG | RESPIRATION RATE: 18 BRPM

## 2024-05-24 DIAGNOSIS — R10.9 ABDOMINAL PAIN, UNSPECIFIED ABDOMINAL LOCATION: Primary | ICD-10-CM

## 2024-05-24 DIAGNOSIS — K21.9 GASTROESOPHAGEAL REFLUX DISEASE, UNSPECIFIED WHETHER ESOPHAGITIS PRESENT: ICD-10-CM

## 2024-05-24 LAB
ALBUMIN SERPL-MCNC: 4.3 G/DL (ref 3.5–5.2)
ALBUMIN/GLOB SERPL: 1.7 G/DL
ALP SERPL-CCNC: 79 U/L (ref 39–117)
ALT SERPL W P-5'-P-CCNC: 12 U/L (ref 1–41)
ANION GAP SERPL CALCULATED.3IONS-SCNC: 11.7 MMOL/L (ref 5–15)
AST SERPL-CCNC: 13 U/L (ref 1–40)
BACTERIA UR QL AUTO: ABNORMAL /HPF
BASOPHILS # BLD AUTO: 0.07 10*3/MM3 (ref 0–0.2)
BASOPHILS NFR BLD AUTO: 0.8 % (ref 0–1.5)
BILIRUB SERPL-MCNC: 0.4 MG/DL (ref 0–1.2)
BILIRUB UR QL STRIP: NEGATIVE
BUN SERPL-MCNC: 12 MG/DL (ref 6–20)
BUN/CREAT SERPL: 11.1 (ref 7–25)
CALCIUM SPEC-SCNC: 9.4 MG/DL (ref 8.6–10.5)
CHLORIDE SERPL-SCNC: 102 MMOL/L (ref 98–107)
CLARITY UR: CLEAR
CO2 SERPL-SCNC: 26.3 MMOL/L (ref 22–29)
COLOR UR: YELLOW
CREAT SERPL-MCNC: 1.08 MG/DL (ref 0.76–1.27)
DEPRECATED RDW RBC AUTO: 40.9 FL (ref 37–54)
EGFRCR SERPLBLD CKD-EPI 2021: 89 ML/MIN/1.73
EOSINOPHIL # BLD AUTO: 0.26 10*3/MM3 (ref 0–0.4)
EOSINOPHIL NFR BLD AUTO: 3 % (ref 0.3–6.2)
ERYTHROCYTE [DISTWIDTH] IN BLOOD BY AUTOMATED COUNT: 13 % (ref 12.3–15.4)
GLOBULIN UR ELPH-MCNC: 2.5 GM/DL
GLUCOSE SERPL-MCNC: 86 MG/DL (ref 65–99)
GLUCOSE UR STRIP-MCNC: NEGATIVE MG/DL
HCT VFR BLD AUTO: 46.3 % (ref 37.5–51)
HGB BLD-MCNC: 16 G/DL (ref 13–17.7)
HGB UR QL STRIP.AUTO: NEGATIVE
HOLD SPECIMEN: NORMAL
HOLD SPECIMEN: NORMAL
HYALINE CASTS UR QL AUTO: ABNORMAL /LPF
IMM GRANULOCYTES # BLD AUTO: 0.04 10*3/MM3 (ref 0–0.05)
IMM GRANULOCYTES NFR BLD AUTO: 0.5 % (ref 0–0.5)
KETONES UR QL STRIP: NEGATIVE
LEUKOCYTE ESTERASE UR QL STRIP.AUTO: ABNORMAL
LIPASE SERPL-CCNC: 29 U/L (ref 13–60)
LYMPHOCYTES # BLD AUTO: 1.48 10*3/MM3 (ref 0.7–3.1)
LYMPHOCYTES NFR BLD AUTO: 17 % (ref 19.6–45.3)
MCH RBC QN AUTO: 30.1 PG (ref 26.6–33)
MCHC RBC AUTO-ENTMCNC: 34.6 G/DL (ref 31.5–35.7)
MCV RBC AUTO: 87 FL (ref 79–97)
MONOCYTES # BLD AUTO: 0.83 10*3/MM3 (ref 0.1–0.9)
MONOCYTES NFR BLD AUTO: 9.5 % (ref 5–12)
NEUTROPHILS NFR BLD AUTO: 6.03 10*3/MM3 (ref 1.7–7)
NEUTROPHILS NFR BLD AUTO: 69.2 % (ref 42.7–76)
NITRITE UR QL STRIP: NEGATIVE
NRBC BLD AUTO-RTO: 0 /100 WBC (ref 0–0.2)
PH UR STRIP.AUTO: 5.5 [PH] (ref 5–8)
PLATELET # BLD AUTO: 122 10*3/MM3 (ref 140–450)
PMV BLD AUTO: 13 FL (ref 6–12)
POTASSIUM SERPL-SCNC: 4.1 MMOL/L (ref 3.5–5.2)
PROT SERPL-MCNC: 6.8 G/DL (ref 6–8.5)
PROT UR QL STRIP: NEGATIVE
RBC # BLD AUTO: 5.32 10*6/MM3 (ref 4.14–5.8)
RBC # UR STRIP: ABNORMAL /HPF
REF LAB TEST METHOD: ABNORMAL
SODIUM SERPL-SCNC: 140 MMOL/L (ref 136–145)
SP GR UR STRIP: 1.01 (ref 1–1.03)
SQUAMOUS #/AREA URNS HPF: ABNORMAL /HPF
TROPONIN T SERPL HS-MCNC: 7 NG/L
UROBILINOGEN UR QL STRIP: ABNORMAL
WBC # UR STRIP: ABNORMAL /HPF
WBC NRBC COR # BLD AUTO: 8.71 10*3/MM3 (ref 3.4–10.8)
WHOLE BLOOD HOLD COAG: NORMAL
WHOLE BLOOD HOLD SPECIMEN: NORMAL

## 2024-05-24 PROCEDURE — 99285 EMERGENCY DEPT VISIT HI MDM: CPT

## 2024-05-24 PROCEDURE — 81001 URINALYSIS AUTO W/SCOPE: CPT

## 2024-05-24 PROCEDURE — 84484 ASSAY OF TROPONIN QUANT: CPT | Performed by: EMERGENCY MEDICINE

## 2024-05-24 PROCEDURE — 80053 COMPREHEN METABOLIC PANEL: CPT | Performed by: EMERGENCY MEDICINE

## 2024-05-24 PROCEDURE — 85025 COMPLETE CBC W/AUTO DIFF WBC: CPT

## 2024-05-24 PROCEDURE — 93005 ELECTROCARDIOGRAM TRACING: CPT | Performed by: EMERGENCY MEDICINE

## 2024-05-24 PROCEDURE — 83690 ASSAY OF LIPASE: CPT | Performed by: EMERGENCY MEDICINE

## 2024-05-24 PROCEDURE — 93010 ELECTROCARDIOGRAM REPORT: CPT | Performed by: INTERNAL MEDICINE

## 2024-05-24 PROCEDURE — 25510000001 IOPAMIDOL PER 1 ML: Performed by: EMERGENCY MEDICINE

## 2024-05-24 PROCEDURE — 36415 COLL VENOUS BLD VENIPUNCTURE: CPT

## 2024-05-24 PROCEDURE — 74177 CT ABD & PELVIS W/CONTRAST: CPT

## 2024-05-24 PROCEDURE — 93005 ELECTROCARDIOGRAM TRACING: CPT

## 2024-05-24 RX ORDER — SUCRALFATE 1 G/1
1 TABLET ORAL 4 TIMES DAILY
Qty: 48 TABLET | Refills: 0 | Status: SHIPPED | OUTPATIENT
Start: 2024-05-24

## 2024-05-24 RX ORDER — SODIUM CHLORIDE 0.9 % (FLUSH) 0.9 %
10 SYRINGE (ML) INJECTION AS NEEDED
Status: DISCONTINUED | OUTPATIENT
Start: 2024-05-24 | End: 2024-05-24 | Stop reason: HOSPADM

## 2024-05-24 RX ADMIN — IOPAMIDOL 100 ML: 755 INJECTION, SOLUTION INTRAVENOUS at 12:16

## 2024-05-24 NOTE — ED PROVIDER NOTES
Time: 12:01 PM EDT  Date of encounter:  5/24/2024  Independent Historian/Clinical History and Information was obtained by:   Patient    History is limited by: N/A    Chief Complaint: Abdominal pain      History of Present Illness:  Patient is a 38 y.o. year old male who presents to the emergency department for evaluation of abdominal pain.  Patient has a history of CHF, hypertension who presents with complaints of abdominal pain.  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  States that there was something white on spicy chicken and it made him feel weird and he spit it out.  He states since that time he has been having some abdominal pain.  Reports he is having pain mainly on his left side of his abdomen.  Denies nausea, vomiting, diarrhea.  He does report that he has had some difficulty urinating recently.  No other complaints this time.    HPI    Patient Care Team  Primary Care Provider: Veronica Chu APRN    Past Medical History:     No Known Allergies  Past Medical History:   Diagnosis Date    Asthma     Bicuspid aortic valve     Broken ankle, left, sequela     HX    CHF (congestive heart failure)     Congenital heart disease     Dyspnea on exertion     Heart murmur     Heart valve disease     History of migraine     Hypertension     QUIT TAKING MEDS AGE 19 DUE TO INSURANCE REASONS    Marfan syndrome     Mitral valve prolapse     Scoliosis     TIA (transient ischemic attack)     HX     Past Surgical History:   Procedure Laterality Date    FEMUR SURGERY Right     X2 FOR FRACTURE AGE 11    LYMPH NODE DISSECTION      NECK    MITRAL VALVE REPAIR/REPLACEMENT N/A 2/17/2023    Procedure: JEFFERSON STERNOTOMY MITRAL VALVE REPAIR AND PRP;  Surgeon: Matthew Gann MD;  Location: [facility];  Service: Cardiothoracic;  Laterality: N/A;    TRANSESOPHAGEAL ECHOCARDIOGRAM (JEFFERSON) N/A 08/11/2022    Procedure: TRANSESOPHAGEAL ECHOCARDIOGRAM;   "Surgeon: ADRIANNA Bernard MD;  Location: [facility] ENDOSCOPY;  Service: Cardiology;  Laterality: N/A;     Family History   Problem Relation Age of Onset    Aortic aneurysm Mother     Heart attack Mother     No Known Problems Father     Malig Hyperthermia Neg Hx        Home Medications:  Prior to Admission medications    Medication Sig Start Date End Date Taking? Authorizing Provider   Aspirin Low Dose 81 MG EC tablet Take 1 tablet by mouth Daily. 7/7/23   Vandana Maxwell MD   folic acid (FOLVITE) 1 MG tablet Take 1 tablet by mouth Daily. 7/7/23   Vandana Maxwell MD   ondansetron ODT (ZOFRAN-ODT) 4 MG disintegrating tablet Place 1 tablet on the tongue Every 8 (Eight) Hours As Needed for Nausea or Vomiting. 3/1/24   Mariel Goode APRN   pantoprazole (Protonix) 40 MG EC tablet Take 1 tablet by mouth Daily. 2/2/24   Eliazar Phipps MD   propranolol (INDERAL) 10 MG tablet Take 1 tablet by mouth 2 (Two) Times a Day. 2/2/24   Eliazar Phipps MD        Social History:   Social History     Tobacco Use    Smoking status: Some Days     Current packs/day: 0.25     Average packs/day: 0.3 packs/day for 14.4 years (3.6 ttl pk-yrs)     Types: Cigarettes     Start date: 2010     Passive exposure: Current    Smokeless tobacco: Never    Tobacco comments:     MORE OF SOCIAL SMOKER FOR 8 YEARS     Smoked at most 2 cpd    Vaping Use    Vaping status: Some Days    Start date: 2/1/2020    Last attempt to quit: 2/1/2023    Substances: Nicotine, Flavoring    Devices: Disposable   Substance Use Topics    Alcohol use: Yes     Comment: rarely    Drug use: Never         Review of Systems:  Review of Systems   Gastrointestinal:  Positive for abdominal pain.        Physical Exam:  /81   Pulse 86   Temp 97.9 °F (36.6 °C) (Oral)   Resp 18   Ht 198.1 cm (78\")   Wt 84.6 kg (186 lb 8.2 oz)   SpO2 97%   BMI 21.55 kg/m²     Physical Exam  Vitals and nursing note reviewed.   Constitutional:       Appearance: " Normal appearance.   HENT:      Head: Normocephalic and atraumatic.   Eyes:      General: No scleral icterus.  Cardiovascular:      Rate and Rhythm: Normal rate and regular rhythm.      Heart sounds: Normal heart sounds.   Pulmonary:      Effort: Pulmonary effort is normal.      Breath sounds: Normal breath sounds.   Abdominal:      Palpations: Abdomen is soft.      Tenderness: There is no abdominal tenderness.   Musculoskeletal:         General: Normal range of motion.      Cervical back: Normal range of motion.   Skin:     Findings: No rash.   Neurological:      General: No focal deficit present.      Mental Status: He is alert.                  Procedures:  Procedures      Medical Decision Making:      Comorbidities that affect care:    Congestive Heart Failure, Hypertension    External Notes reviewed:    Reviewed urgent care note from 3/1/2024      The following orders were placed and all results were independently analyzed by me:  Orders Placed This Encounter   Procedures    CT Abdomen Pelvis With Contrast    Gentry Draw    Comprehensive Metabolic Panel    Lipase    Single High Sensitivity Troponin T    Urinalysis With Microscopic If Indicated (No Culture) - Urine, Clean Catch    CBC Auto Differential    Urinalysis, Microscopic Only - Urine, Clean Catch    NPO Diet NPO Type: Strict NPO    Undress & Gown    Continuous Pulse Oximetry    Vital Signs    Oxygen Therapy- Nasal Cannula; Titrate 1-6 LPM Per SpO2; 90 - 95%    ECG 12 Lead ED Triage Standing Order; Abdominal Pain (Upper)    Insert Peripheral IV    CBC & Differential    Green Top (Gel)    Lavender Top    Gold Top - SST    Light Blue Top       Medications Given in the Emergency Department:  Medications   sodium chloride 0.9 % flush 10 mL (has no administration in time range)   iopamidol (ISOVUE-370) 76 % injection 100 mL (100 mL Intravenous Given 5/24/24 1216)        ED Course:         Labs:    Lab Results (last 24 hours)       Procedure Component Value  Units Date/Time    CBC & Differential [[id number]]  (Abnormal) Collected: 05/24/24 1106    Specimen: Blood from Arm, Right Updated: 05/24/24 1130    Narrative:      The following orders were created for panel order CBC & Differential.  Procedure                               Abnormality         Status                     ---------                               -----------         ------                     CBC Auto Differential[[id number]]        Abnormal            Final result                 Please view results for these tests on the individual orders.    Comprehensive Metabolic Panel [[id number]] Collected: 05/24/24 1106    Specimen: Blood from Arm, Right Updated: 05/24/24 1157     Glucose 86 mg/dL      BUN 12 mg/dL      Creatinine 1.08 mg/dL      Sodium 140 mmol/L      Potassium 4.1 mmol/L      Chloride 102 mmol/L      CO2 26.3 mmol/L      Calcium 9.4 mg/dL      Total Protein 6.8 g/dL      Albumin 4.3 g/dL      ALT (SGPT) 12 U/L      AST (SGOT) 13 U/L      Alkaline Phosphatase 79 U/L      Total Bilirubin 0.4 mg/dL      Globulin 2.5 gm/dL      A/G Ratio 1.7 g/dL      BUN/Creatinine Ratio 11.1     Anion Gap 11.7 mmol/L      eGFR 89.0 mL/min/1.73     Narrative:      GFR Normal >60  Chronic Kidney Disease <60  Kidney Failure <15      Lipase [[id number]]  (Normal) Collected: 05/24/24 1106    Specimen: Blood from Arm, Right Updated: 05/24/24 1157     Lipase 29 U/L     Single High Sensitivity Troponin T [[id number]]  (Normal) Collected: 05/24/24 1106    Specimen: Blood from Arm, Right Updated: 05/24/24 1157     HS Troponin T 7 ng/L     Narrative:      High Sensitive Troponin T Reference Range:  <14.0 ng/L- Negative Female for AMI  <22.0 ng/L- Negative Male for AMI  >=14 - Abnormal Female indicating possible myocardial injury.  >=22 - Abnormal Male indicating possible myocardial injury.   Clinicians would have to utilize clinical acumen, EKG, Troponin, and serial changes to determine if it is an Acute Myocardial Infarction  or myocardial injury due to an underlying chronic condition.         CBC Auto Differential [[id number]]  (Abnormal) Collected: 05/24/24 1106    Specimen: Blood from Arm, Right Updated: 05/24/24 1130     WBC 8.71 10*3/mm3      RBC 5.32 10*6/mm3      Hemoglobin 16.0 g/dL      Hematocrit 46.3 %      MCV 87.0 fL      MCH 30.1 pg      MCHC 34.6 g/dL      RDW 13.0 %      RDW-SD 40.9 fl      MPV 13.0 fL      Platelets 122 10*3/mm3      Neutrophil % 69.2 %      Lymphocyte % 17.0 %      Monocyte % 9.5 %      Eosinophil % 3.0 %      Basophil % 0.8 %      Immature Grans % 0.5 %      Neutrophils, Absolute 6.03 10*3/mm3      Lymphocytes, Absolute 1.48 10*3/mm3      Monocytes, Absolute 0.83 10*3/mm3      Eosinophils, Absolute 0.26 10*3/mm3      Basophils, Absolute 0.07 10*3/mm3      Immature Grans, Absolute 0.04 10*3/mm3      nRBC 0.0 /100 WBC     Urinalysis With Microscopic If Indicated (No Culture) - Urine, Clean Catch [[id number]]  (Abnormal) Collected: 05/24/24 1129    Specimen: Urine, Clean Catch Updated: 05/24/24 1149     Color, UA Yellow     Appearance, UA Clear     pH, UA 5.5     Specific Gravity, UA 1.011     Glucose, UA Negative     Ketones, UA Negative     Bilirubin, UA Negative     Blood, UA Negative     Protein, UA Negative     Leuk Esterase, UA Trace     Nitrite, UA Negative     Urobilinogen, UA 0.2 E.U./dL    Urinalysis, Microscopic Only - Urine, Clean Catch [[id number]]  (Abnormal) Collected: 05/24/24 1129    Specimen: Urine, Clean Catch Updated: 05/24/24 1149     RBC, UA None Seen /HPF      WBC, UA 3-5 /HPF      Bacteria, UA None Seen /HPF      Squamous Epithelial Cells, UA 0-2 /HPF      Hyaline Casts, UA None Seen /LPF      Methodology Automated Microscopy             Imaging:    CT Abdomen Pelvis With Contrast    Result Date: 5/24/2024  CT ABDOMEN PELVIS W CONTRAST-  Date of Exam: 5/24/2024 12:06 PM  Indication: abdominal pain.  Comparison: 1/22/2024 and prior  Technique: Contiguous axial CT images were obtained  from the lung bases to the pubic symphysis following the uneventful administration of 100 MLO Isovue-370 intravenous and oral contrast. Sagittal and coronal reconstructions were performed.  Automated exposure control and iterative reconstruction methods were used.   FINDINGS:  Lower Chest: Limited imaging lung bases is grossly clear.  No free air noted below the diaphragm.  Organs: Gallbladder is decompressed and otherwise unremarkable. Small low-attenuation lesion within the right hepatic lobe likely a small cyst. Liver demonstrates no acute abnormality. Portal venous system opacifies unremarkable. The spleen, right kidney, pancreas and adrenal glands are unremarkable in appearance. There is fullness of the left renal pelvis. No evidence of an obstructing process noted. Nonobstructing calculus noted there is an approximate 1 to 2 mm left kidney  GI/Bowel: The stomach is distended and is otherwise unremarkable in appearance. Small bowel demonstrates no evidence of obstruction or definite acute abnormality. No suspicious mesenteric adenopathy or fluid collection noted. Small lymph nodes within the upper abdomen are likely reactive  Colon demonstrates no definite acute abnormality relative decompression over the distal aspect of the transverse colon near the hepatic flexure noted. Ileocecal valve is unremarkable. Appendix is best seen on the coronal imaging and appears grossly unremarkable in appearance  Pelvis: Urinary bladder unremarkable. Prostate and pelvic structures demonstrate no definite acute abnormality. No suspicious pelvic adenopathy or fluid collection is noted  Peritoneum/Retroperitoneum: Aorta is normal in caliber. There is no suspicious retroperitoneal adenopathy.  Bones/Soft Tissues: No acute osseous abnormality       1. Nonobstructing left renal calculus  2. Mild distention of the stomach. No evidence of obstruction      Electronically Signed By-Ike Rivera On:5/24/2024 12:29 PM          Differential Diagnosis and Discussion:    Abdominal Pain: Based on the patient's signs and symptoms, I considered abdominal aortic aneurysm, small bowel obstruction, pancreatitis, acute cholecystitis, acute appendecitis, peptic ulcer disease, gastritis, colitis, endocrine disorders, irritable bowel syndrome and other differential diagnosis an etiology of the patient's abdominal pain.    All labs were reviewed and interpreted by me.  CT scan radiology impression was interpreted by me.    MDM     Amount and/or Complexity of Data Reviewed  Clinical lab tests: reviewed  Tests in the radiology section of CPT®: reviewed  Tests in the medicine section of CPT®: reviewed       Patient is a 38-year-old gentleman who presents with complaints of sore throat as well as abdominal pain.  States that he is just not felt well for couple days after eating a spicy sandwich as well as mushroom she was burgers.  Patient is already on Protonix for reflux.  He also reports that he has had some left lower abdominal pain.  CT does not show any acute findings.  He does report that he is has burning and within his throat.  Will place on Carafate.  Will also give follow-up with GI.  Troponin is negative here.  I think the patient is otherwise well-appearing at this time.  Recommend outpatient follow-up.  Will DC.          Patient Care Considerations:          Consultants/Shared Management Plan:    None    Social Determinants of Health:    Patient is independent, reliable, and has access to care.       Disposition and Care Coordination:    Discharged: The patient is suitable and stable for discharge with no need for consideration of admission.    I have explained the patient´s condition, diagnoses and treatment plan based on the information available to me at this time. I have answered questions and addressed any concerns. The patient has a good  understanding of the patient´s diagnosis, condition, and treatment plan as can be expected at  this point. The vital signs have been stable. The patient´s condition is stable and appropriate for discharge from the emergency department.      The patient will pursue further outpatient evaluation with the primary care physician or other designated or consulting physician as outlined in the discharge instructions. They are agreeable to this plan of care and follow-up instructions have been explained in detail. The patient has received these instructions in written format and have expressed an understanding of the discharge instructions. The patient is aware that any significant change in condition or worsening of symptoms should prompt an immediate return to this or the closest emergency department or call to 911.      Final diagnoses:   Abdominal pain, unspecified abdominal location   Gastroesophageal reflux disease, unspecified whether esophagitis present        ED Disposition       ED Disposition   Discharge    Condition   Stable    Comment   --               This medical record created using voice recognition software.             Randy Marin MD  05/24/24 [id number]

## 2024-05-25 LAB
QT INTERVAL: 369 MS
QTC INTERVAL: 435 MS

## 2024-09-10 ENCOUNTER — TELEPHONE (OUTPATIENT)
Dept: CARDIOLOGY | Facility: CLINIC | Age: 39
End: 2024-09-10
Payer: MEDICAID

## 2024-09-10 NOTE — TELEPHONE ENCOUNTER
Notified patient letter is in his chart. IF needed to fax to call the office back and will fax to employer.     Patient verbalized understanding and appreciation.

## 2024-09-10 NOTE — TELEPHONE ENCOUNTER
Procedure: CDL    Medication Directive: NA    PMH: Severe Mitral Regurgitaiton, Bicuspid Aortic Valve    Last Seen: 02/02/24    ECHO: 05/09/23      Left ventricular ejection fraction appears to be 56 - 60%.    Left ventricular wall thickness is consistent with borderline concentric hypertrophy.    Left ventricular diastolic function was normal.    Aortic valve is not well visualized but is thought to be bicuspid.  There is mild aortic insufficiency.  No significant aortic stenosis.    Status post mitral valve repair with no significant gradients and mild mitral regurgitation.       JEFFERSON: 02/17/23

## 2024-09-19 ENCOUNTER — OFFICE VISIT (OUTPATIENT)
Dept: CARDIOLOGY | Facility: CLINIC | Age: 39
End: 2024-09-19
Payer: MEDICAID

## 2024-09-19 VITALS
HEART RATE: 85 BPM | BODY MASS INDEX: 21.4 KG/M2 | SYSTOLIC BLOOD PRESSURE: 114 MMHG | DIASTOLIC BLOOD PRESSURE: 79 MMHG | WEIGHT: 185 LBS | HEIGHT: 78 IN

## 2024-09-19 DIAGNOSIS — Q23.1 BICUSPID AORTIC VALVE: Primary | ICD-10-CM

## 2024-09-19 DIAGNOSIS — I34.0 SEVERE MITRAL REGURGITATION: ICD-10-CM

## 2024-09-19 DIAGNOSIS — F41.9 ANXIETY: ICD-10-CM

## 2024-09-19 PROCEDURE — 1159F MED LIST DOCD IN RCRD: CPT

## 2024-09-19 PROCEDURE — 99214 OFFICE O/P EST MOD 30 MIN: CPT

## 2024-09-19 PROCEDURE — 1160F RVW MEDS BY RX/DR IN RCRD: CPT

## 2024-10-04 ENCOUNTER — HOSPITAL ENCOUNTER (OUTPATIENT)
Dept: CARDIOLOGY | Facility: HOSPITAL | Age: 39
Discharge: HOME | End: 2024-10-04
Payer: MEDICAID

## 2024-10-04 DIAGNOSIS — I34.0 SEVERE MITRAL REGURGITATION: ICD-10-CM

## 2024-10-04 DIAGNOSIS — Q23.81 BICUSPID AORTIC VALVE: ICD-10-CM

## 2024-10-04 LAB
AORTIC ARCH: 3 CM
ASCENDING AORTA: 2.7 CM
BH CV ECHO MEAS - AO MAX PG: 3.1 MMHG
BH CV ECHO MEAS - AO MEAN PG: 1.73 MMHG
BH CV ECHO MEAS - AO V2 MAX: 87 CM/SEC
BH CV ECHO MEAS - AO V2 VTI: 13.5 CM
BH CV ECHO MEAS - AVA(I,D): 0.8 CM2
BH CV ECHO MEAS - EDV(MOD-SP2): 108.7 ML
BH CV ECHO MEAS - EDV(MOD-SP4): 126 ML
BH CV ECHO MEAS - EF(MOD-SP2): 55.4 %
BH CV ECHO MEAS - EF(MOD-SP4): 59 %
BH CV ECHO MEAS - ESV(MOD-SP2): 48.5 ML
BH CV ECHO MEAS - ESV(MOD-SP4): 51.7 ML
BH CV ECHO MEAS - IVS/LVPW: 0.83 CM
BH CV ECHO MEAS - IVSD: 1 CM
BH CV ECHO MEAS - LA DIMENSION: 3.2 CM
BH CV ECHO MEAS - LAT PEAK E' VEL: 17 CM/SEC
BH CV ECHO MEAS - LV DIASTOLIC VOL/BSA (35-75): 57.7 CM2
BH CV ECHO MEAS - LV MAX PG: 1.96 MMHG
BH CV ECHO MEAS - LV MEAN PG: 1 MMHG
BH CV ECHO MEAS - LV SYSTOLIC VOL/BSA (12-30): 23.7 CM2
BH CV ECHO MEAS - LV V1 MAX: 70 CM/SEC
BH CV ECHO MEAS - LV V1 VTI: 11.1 CM
BH CV ECHO MEAS - LVIDD: 4.4 CM
BH CV ECHO MEAS - LVIDS: 3.1 CM
BH CV ECHO MEAS - LVOT DIAM: 2.6 CM
BH CV ECHO MEAS - LVPWD: 1.2 CM
BH CV ECHO MEAS - MED PEAK E' VEL: 13.1 CM/SEC
BH CV ECHO MEAS - MV A MAX VEL: 73.4 CM/SEC
BH CV ECHO MEAS - MV DEC SLOPE: 381 CM/SEC2
BH CV ECHO MEAS - MV E MAX VEL: 84.3 CM/SEC
BH CV ECHO MEAS - MV E/A: 1.15
BH CV ECHO MEAS - MV MAX PG: 2.3 MMHG
BH CV ECHO MEAS - MV MEAN PG: 1.6 MMHG
BH CV ECHO MEAS - MV V2 VTI: 16.6 CM
BH CV ECHO MEAS - SUP REN AO DIAM: 2.1 CM
BH CV ECHO MEAS - SV(MOD-SP2): 60.2 ML
BH CV ECHO MEAS - SV(MOD-SP4): 74.3 ML
BH CV ECHO MEAS - SVI(MOD-SP2): 27.6 ML/M2
BH CV ECHO MEAS - SVI(MOD-SP4): 34 ML/M2
BH CV ECHO MEAS - TR MAX PG: 18.6 MMHG
BH CV ECHO MEAS - TR MAX VEL: 215.5 CM/SEC
BH CV ECHO MEASUREMENTS AVERAGE E/E' RATIO: 5.6
BH CV XLRA - RV BASE: 3.4 CM
BH CV XLRA - TDI S': 7.8 CM/SEC
IVRT: 72 MS
SINUS: 3.3 CM

## 2024-10-04 PROCEDURE — 93306 TTE W/DOPPLER COMPLETE: CPT

## 2024-11-04 RX ORDER — PANTOPRAZOLE SODIUM 40 MG/1
40 TABLET, DELAYED RELEASE ORAL DAILY
Qty: 90 TABLET | Refills: 1 | Status: SHIPPED | OUTPATIENT
Start: 2024-11-04

## 2024-11-15 ENCOUNTER — HOSPITAL ENCOUNTER (EMERGENCY)
Facility: HOSPITAL | Age: 39
Discharge: HOME | End: 2024-11-15
Attending: EMERGENCY MEDICINE
Payer: MEDICAID

## 2024-11-15 ENCOUNTER — APPOINTMENT (OUTPATIENT)
Dept: GENERAL RADIOLOGY | Facility: HOSPITAL | Age: 39
End: 2024-11-15
Payer: MEDICAID

## 2024-11-15 VITALS
HEART RATE: 93 BPM | SYSTOLIC BLOOD PRESSURE: 124 MMHG | BODY MASS INDEX: 21.02 KG/M2 | HEIGHT: 78 IN | WEIGHT: 181.66 LBS | TEMPERATURE: 98.9 F | OXYGEN SATURATION: 97 % | RESPIRATION RATE: 18 BRPM | DIASTOLIC BLOOD PRESSURE: 77 MMHG

## 2024-11-15 DIAGNOSIS — R05.1 ACUTE COUGH: Primary | ICD-10-CM

## 2024-11-15 DIAGNOSIS — J40 BRONCHITIS: ICD-10-CM

## 2024-11-15 LAB
FLUAV SUBTYP SPEC NAA+PROBE: NOT DETECTED
FLUBV RNA ISLT QL NAA+PROBE: NOT DETECTED
RSV RNA NPH QL NAA+NON-PROBE: NOT DETECTED
S PYO AG THROAT QL: NEGATIVE
SARS-COV-2 RNA RESP QL NAA+PROBE: NOT DETECTED

## 2024-11-15 PROCEDURE — 99283 EMERGENCY DEPT VISIT LOW MDM: CPT

## 2024-11-15 PROCEDURE — 87880 STREP A ASSAY W/OPTIC: CPT | Performed by: NURSE PRACTITIONER

## 2024-11-15 PROCEDURE — 87081 CULTURE SCREEN ONLY: CPT | Performed by: NURSE PRACTITIONER

## 2024-11-15 PROCEDURE — 71045 X-RAY EXAM CHEST 1 VIEW: CPT

## 2024-11-15 PROCEDURE — 63710000001 PREDNISONE PER 5 MG: Performed by: NURSE PRACTITIONER

## 2024-11-15 PROCEDURE — 87637 SARSCOV2&INF A&B&RSV AMP PRB: CPT | Performed by: NURSE PRACTITIONER

## 2024-11-15 RX ORDER — AZITHROMYCIN 250 MG/1
500 TABLET, FILM COATED ORAL ONCE
Status: COMPLETED | OUTPATIENT
Start: 2024-11-15 | End: 2024-11-15

## 2024-11-15 RX ORDER — PREDNISONE 50 MG/1
50 TABLET ORAL ONCE
Status: COMPLETED | OUTPATIENT
Start: 2024-11-15 | End: 2024-11-15

## 2024-11-15 RX ORDER — PREDNISONE 50 MG/1
50 TABLET ORAL DAILY
Qty: 5 TABLET | Refills: 0 | Status: SHIPPED | OUTPATIENT
Start: 2024-11-15 | End: 2024-11-20

## 2024-11-15 RX ORDER — ALBUTEROL SULFATE 90 UG/1
2 INHALANT RESPIRATORY (INHALATION) EVERY 4 HOURS PRN
Qty: 6.7 G | Refills: 0 | Status: SHIPPED | OUTPATIENT
Start: 2024-11-15

## 2024-11-15 RX ORDER — AZITHROMYCIN 250 MG/1
TABLET, FILM COATED ORAL
Qty: 6 TABLET | Refills: 0 | Status: SHIPPED | OUTPATIENT
Start: 2024-11-15

## 2024-11-15 RX ADMIN — PREDNISONE 50 MG: 50 TABLET ORAL at 03:16

## 2024-11-15 RX ADMIN — AZITHROMYCIN DIHYDRATE 500 MG: 250 TABLET ORAL at 03:16

## 2024-11-15 NOTE — DISCHARGE INSTRUCTIONS
, Drink plenty of fluids.  Take your meds as prescribed.  Complete your antibiotics.  You may take over-the-counter acetaminophen and Motrin as needed for aches pains and fever.  Follow-up with GUILLERMINA Mota in 2 to 3 days for reevaluation and further treatment as necessary.  Return to the emergency department immediately for any acutely developing respiratory distress, any airway difficulties, any chest pain with shortness of air or any new or worse concerns.

## 2024-11-15 NOTE — ED PROVIDER NOTES
Time: 1:10 AM EST  Date of encounter:  11/15/2024  Independent Historian/Clinical History and Information was obtained by:   Patient    History is limited by: N/A    Chief Complaint: COUGH/CONGESTION/SOA      History of Present Illness:    The patient is a 38 y.o. year old male who presents to the emergency department for evaluation of cough and upper respiratory symptoms as he states he has had for about a week now.  He states that Monday he started noticing that he was having some nausea.  He states then he started feeling a pressure in his head.  He then reports that he started having a runny nose and some nasal congestion.  He states that he has now developed cough and has been coughing up some yellowish-green sputum.  He reports that he recently moved into a house that he feels is that black mold in the basement and feels like the vent is right under his bed and that this is what is creating his symptoms.  He reports no recent fevers.  He states he has no history of asthma.  He is in no respiratory distress on exam.  His breath sounds are clear.  His airway is patent.      Patient Care Team  Primary Care Provider: Veronica Chu APRN    Past Medical History:     No Known Allergies  Past Medical History:   Diagnosis Date    Asthma     Bicuspid aortic valve     Broken ankle, left, sequela     HX    CHF (congestive heart failure)     Congenital heart disease     Dyspnea on exertion     Heart murmur     Heart valve disease     History of migraine     Hypertension     QUIT TAKING MEDS AGE 19 DUE TO INSURANCE REASONS    Marfan syndrome     Mitral valve prolapse     Scoliosis     TIA (transient ischemic attack)     HX     Past Surgical History:   Procedure Laterality Date    FEMUR SURGERY Right     X2 FOR FRACTURE AGE 11    LYMPH NODE DISSECTION      NECK    MITRAL VALVE REPAIR/REPLACEMENT N/A 2/17/2023    Procedure: JEFFERSON STERNOTOMY MITRAL VALVE REPAIR AND PRP;  Surgeon: Matthew Gann MD;  Location: [facility];   Service: Cardiothoracic;  Laterality: N/A;    TRANSESOPHAGEAL ECHOCARDIOGRAM (JEFFERSON) N/A 2022    Procedure: TRANSESOPHAGEAL ECHOCARDIOGRAM;  Surgeon: ADRIANNA Bernard MD;  Location: [facility];  Service: Cardiology;  Laterality: N/A;     Family History   Problem Relation Age of Onset    Aortic aneurysm Mother     Heart attack Mother     No Known Problems Father     Malig Hyperthermia Neg Hx        Home Medications:  Prior to Admission medications    Medication Sig Start Date End Date Taking? Authorizing Provider   Aspirin Low Dose 81 MG EC tablet Take 1 tablet by mouth Daily. 23  Yes ProviderVandana MD   folic acid (FOLVITE) 1 MG tablet Take 1 tablet by mouth Daily. 23  Yes ProviderVandana MD   pantoprazole (PROTONIX) 40 MG EC tablet TAKE 1 TABLET BY MOUTH EVERY DAY 24   Eliazar Phipps MD   propranolol (INDERAL) 10 MG tablet Take 1 tablet by mouth 2 (Two) Times a Day. 24   Eliazar Phipps MD        Social History:   Social History     Tobacco Use    Smoking status: Former     Current packs/day: 0.00     Average packs/day: 0.3 packs/day for 14.0 years (3.5 ttl pk-yrs)     Types: Cigarettes     Start date:      Quit date:      Years since quittin.8     Passive exposure: Current    Smokeless tobacco: Never    Tobacco comments:     MORE OF SOCIAL SMOKER FOR 8 YEARS     Smoked at most 2 cpd    Vaping Use    Vaping status: Some Days    Start date: 2020    Last attempt to quit: 2023    Substances: Nicotine, Flavoring    Devices: Disposable   Substance Use Topics    Alcohol use: Yes     Comment: rarely    Drug use: Never         Review of Systems:  Review of Systems   Constitutional:  Negative for chills and fever.   HENT:  Positive for congestion, rhinorrhea and sore throat. Negative for ear pain and trouble swallowing.    Eyes:  Negative for pain.   Respiratory:  Positive for cough and shortness of breath. Negative for chest tightness and wheezing.   "  Cardiovascular:  Negative for chest pain and leg swelling.   Gastrointestinal:  Negative for abdominal pain, diarrhea, nausea and vomiting.   Genitourinary:  Negative for dysuria, flank pain, frequency, hematuria and urgency.   Musculoskeletal:  Negative for back pain, joint swelling, neck pain and neck stiffness.   Skin:  Negative for pallor and rash.   Neurological:  Positive for headaches. Negative for seizures.   All other systems reviewed and are negative.       Physical Exam:  /77 (BP Location: Left arm, Patient Position: Sitting)   Pulse 93   Temp 98.9 °F (37.2 °C) (Oral)   Resp 18   Ht 198.1 cm (78\")   Wt 82.4 kg (181 lb 10.5 oz)   SpO2 97%   BMI 20.99 kg/m²     Physical Exam  Vitals and nursing note reviewed.   Constitutional:       General: He is not in acute distress.     Appearance: Normal appearance. He is not ill-appearing or toxic-appearing.   HENT:      Head: Normocephalic and atraumatic.      Nose: Congestion and rhinorrhea present.      Mouth/Throat:      Mouth: Mucous membranes are moist.      Pharynx: Oropharynx is clear. Posterior oropharyngeal erythema present. No oropharyngeal exudate.   Eyes:      General: No scleral icterus.     Conjunctiva/sclera: Conjunctivae normal.      Pupils: Pupils are equal, round, and reactive to light.   Cardiovascular:      Rate and Rhythm: Normal rate and regular rhythm.      Pulses: Normal pulses.   Pulmonary:      Effort: Pulmonary effort is normal. No respiratory distress.      Breath sounds: Normal breath sounds. No stridor. No wheezing or rales.   Musculoskeletal:         General: No swelling or tenderness. Normal range of motion.      Cervical back: Normal range of motion and neck supple. No rigidity or tenderness.      Right lower leg: No edema.      Left lower leg: No edema.   Lymphadenopathy:      Cervical: No cervical adenopathy.   Skin:     General: Skin is warm and dry.      Capillary Refill: Capillary refill takes less than 2 seconds. "      Findings: No rash.   Neurological:      General: No focal deficit present.      Mental Status: He is alert and oriented to person, place, and time. Mental status is at baseline.   Psychiatric:         Mood and Affect: Mood normal.         Behavior: Behavior normal.                Procedures:  Procedures      Medical Decision Making:      Comorbidities that affect care:    CHF, heart murmur, asthma, mitral valve prolapse, bicuspid valve, Marfan syndrome, scoliosis, congenital heart disease, heart valve disease, hypertension, left ankle fracture, migraines, dyspnea on exertion, TIA    External Notes reviewed:    Previous Clinic Note: Previous office note from [facility] cardiology on 9/19/2024      The following orders were placed and all results were independently analyzed by me:  Orders Placed This Encounter   Procedures    COVID-19, FLU A/B, RSV PCR 1 HR TAT - Swab, Nasopharynx    Rapid Strep A Screen - Swab, Throat    Beta Strep Culture, Throat - Swab, Throat    XR Chest 1 View       Medications Given in the Emergency Department:  Medications   azithromycin (ZITHROMAX) tablet 500 mg (has no administration in time range)   predniSONE (DELTASONE) tablet 50 mg (has no administration in time range)        ED Course:         Labs:    Lab Results (last 24 hours)       Procedure Component Value Units Date/Time    Rapid Strep A Screen - Swab, Throat [[id number]]  (Normal) Collected: 11/15/24 0116    Specimen: Swab from Throat Updated: 11/15/24 0147     Strep A Ag Negative    Beta Strep Culture, Throat - Swab, Throat [[id number]] Collected: 11/15/24 0116    Specimen: Swab from Throat Updated: 11/15/24 0147    COVID-19, FLU A/B, RSV PCR 1 HR TAT - Swab, Nasopharynx [[id number]]  (Normal) Collected: 11/15/24 0139    Specimen: Swab from Nasopharynx Updated: 11/15/24 0222     COVID19 Not Detected     Influenza A PCR Not Detected     Influenza B PCR Not Detected     RSV, PCR Not Detected    Narrative:      Fact sheet for  providers: https://www.fda.gov/media/208246/download    Fact sheet for patients: https://www.fda.gov/media/945628/download    Test performed by PCR.             Imaging:    XR Chest 1 View    Result Date: 11/15/2024  XR CHEST 1 VW Date of Exam: 11/15/2024 1:10 AM EST Indication: COUGH/BLACK MOLD EXPOSURE Comparison: 2/1/2024 Findings: Cardiac and mediastinal contours are normal. Patient is status post sternotomy and valve replacement. Pulmonary vascularity is normal. The lungs are well expanded and clear. No pneumothorax.     No acute cardiopulmonary findings. Electronically Signed: Mian Guillaume MD  11/15/2024 1:28 AM EST  Workstation ID: [id number]       Differential Diagnosis and Discussion:    Cough: Differential diagnosis includes but is not limited to pneumonia, acute bronchitis, upper respiratory infection, ACE inhibitor use, allergic reaction, epiglottitis, seasonal allergies, chemical irritants, exercise-induced asthma, viral syndrome.  Dyspnea: Differential diagnosis includes but is not limited to metabolic acidosis, neurological disorders, psychogenic, asthma, pneumothorax, upper airway obstruction, COPD, pneumonia, noncardiogenic pulmonary edema, interstitial lung disease, anemia, congestive heart failure, and pulmonary embolism    All labs were reviewed and interpreted by me.  All X-rays impressions were independently interpreted by me.    MDM  Number of Diagnoses or Management Options  Acute cough: new and requires workup  Bronchitis: new and requires workup     Amount and/or Complexity of Data Reviewed  Clinical lab tests: reviewed  Tests in the radiology section of CPT®: reviewed    Risk of Complications, Morbidity, and/or Mortality  Presenting problems: low  Diagnostic procedures: low  Management options: low    Patient Progress  Patient progress: stable             Patient Care Considerations:    LABS: I considered ordering labs, however considering the patient stable condition and complaint I did  not feel it was necessary at this time.      Consultants/Shared Management Plan:    None    Social Determinants of Health:    Patient is independent, reliable, and has access to care.       Disposition and Care Coordination:    Discharged: The patient is suitable and stable for discharge with no need for consideration of admission.    I have explained the patient´s condition, diagnoses and treatment plan based on the information available to me at this time. I have answered questions and addressed any concerns. The patient has a good  understanding of the patient´s diagnosis, condition, and treatment plan as can be expected at this point. The vital signs have been stable. The patient´s condition is stable and appropriate for discharge from the emergency department.      The patient will pursue further outpatient evaluation with the primary care physician or other designated or consulting physician as outlined in the discharge instructions. They are agreeable to this plan of care and follow-up instructions have been explained in detail. The patient has received these instructions in written format and has expressed an understanding of the discharge instructions. The patient is aware that any significant change in condition or worsening of symptoms should prompt an immediate return to this or the closest emergency department or call to 911.  I have explained discharge medications and the need for follow up with the patient/caretakers. This was also printed in the discharge instructions. Patient was discharged with the following medications and follow up:      Medication List        New Prescriptions      albuterol sulfate  (90 Base) MCG/ACT inhaler  Commonly known as: PROVENTIL HFA;VENTOLIN HFA;PROAIR HFA  Inhale 2 puffs Every 4 (Four) Hours As Needed for Wheezing or Shortness of Air.     azithromycin 250 MG tablet  Commonly known as: Zithromax Z-Ez  Take 2 tablets by mouth on day 1, then 1 tablet daily on  days 2-5     predniSONE 50 MG tablet  Commonly known as: DELTASONE  Take 1 tablet by mouth Daily for 5 days.               Where to Get Your Medications        These medications were sent to [facility]/pharmacy #[id number] - [place], [place] - [address] - [phone]  - [phone] FX  [address] [place] [address] [place] [address]      Hours: 24-hours Phone: [phone]   albuterol sulfate  (90 Base) MCG/ACT inhaler  azithromycin 250 MG tablet  predniSONE 50 MG tablet      Veronica Chu, APRN  [address]  [address]  [facility] [id number] [phone]    Call   FOR FOLLOW UP       Final diagnoses:   Acute cough   Bronchitis        ED Disposition       ED Disposition   Discharge    Condition   Stable    Comment   --               This medical record created using voice recognition software.             Aurora Quintanilla, APRN  11/15/24 5615

## 2024-11-15 NOTE — ED NOTES
"Patient refusing covid swab due to \"I have literally seen them use a covid test without testing someone and it has covid on it.\" Strep swab was able to be collected  by patient.     Per patient he has a complaint of mild lower abdominal pain, cough since Monday with sore throat and wheezing cough. Denies fever, rash or soa, or pain.    Patient states he has a rental that has mold all over the walls in the basement and his bed is over an old vent. Patient concerned he is suffering from mold exposure. Patient states that it is a remodeled rental that he has been in for one month but only a total of 10 days due to his job.     Patient also concerned that when he turned the propane on the other day, it had a funny smell. Patient states he has a carbon monoxide detector so it may not be a problem.     Per patient he woke up Monday with a sore throat. He went OTR (), Monday night he developed cold symptoms and tried to sweat it off. Tuesday he woke up hurting and weak feeling and started coughing up yellow, green and grey clumpy phlegm. Wednesday he turned of the heat. Thursday he had a runny nose, the phlegm continued and every time he would eat, he would have burping and gurgling and then it turned to diarrhea.   "

## 2024-11-17 LAB — BACTERIA SPEC AEROBE CULT: NORMAL

## 2024-12-12 RX ORDER — PROPRANOLOL HYDROCHLORIDE 10 MG/1
10 TABLET ORAL 2 TIMES DAILY
Qty: 180 TABLET | Refills: 1 | Status: SHIPPED | OUTPATIENT
Start: 2024-12-12

## 2025-01-04 ENCOUNTER — APPOINTMENT (OUTPATIENT)
Dept: CT IMAGING | Facility: HOSPITAL | Age: 40
End: 2025-01-04
Payer: MEDICAID

## 2025-01-04 ENCOUNTER — HOSPITAL ENCOUNTER (EMERGENCY)
Facility: HOSPITAL | Age: 40
Discharge: HOME | End: 2025-01-04
Attending: EMERGENCY MEDICINE
Payer: MEDICAID

## 2025-01-04 VITALS
TEMPERATURE: 98 F | DIASTOLIC BLOOD PRESSURE: 62 MMHG | RESPIRATION RATE: 18 BRPM | HEART RATE: 77 BPM | SYSTOLIC BLOOD PRESSURE: 106 MMHG | OXYGEN SATURATION: 95 % | HEIGHT: 78 IN | BODY MASS INDEX: 21.3 KG/M2 | WEIGHT: 184.08 LBS

## 2025-01-04 DIAGNOSIS — R20.2 PARESTHESIA: Primary | ICD-10-CM

## 2025-01-04 LAB
ALBUMIN SERPL-MCNC: 4.6 G/DL (ref 3.5–5.2)
ALBUMIN/GLOB SERPL: 1.8 G/DL
ALP SERPL-CCNC: 69 U/L (ref 39–117)
ALT SERPL W P-5'-P-CCNC: 7 U/L (ref 1–41)
ANION GAP SERPL CALCULATED.3IONS-SCNC: 9.9 MMOL/L (ref 5–15)
AST SERPL-CCNC: 12 U/L (ref 1–40)
BASOPHILS # BLD AUTO: 0.07 10*3/MM3 (ref 0–0.2)
BASOPHILS NFR BLD AUTO: 0.9 % (ref 0–1.5)
BILIRUB SERPL-MCNC: 0.4 MG/DL (ref 0–1.2)
BUN SERPL-MCNC: 12 MG/DL (ref 6–20)
BUN/CREAT SERPL: 11.9 (ref 7–25)
CALCIUM SPEC-SCNC: 9.5 MG/DL (ref 8.6–10.5)
CHLORIDE SERPL-SCNC: 102 MMOL/L (ref 98–107)
CO2 SERPL-SCNC: 27.1 MMOL/L (ref 22–29)
CREAT SERPL-MCNC: 1.01 MG/DL (ref 0.76–1.27)
DEPRECATED RDW RBC AUTO: 40.1 FL (ref 37–54)
EGFRCR SERPLBLD CKD-EPI 2021: 95.8 ML/MIN/1.73
EOSINOPHIL # BLD AUTO: 0.28 10*3/MM3 (ref 0–0.4)
EOSINOPHIL NFR BLD AUTO: 3.5 % (ref 0.3–6.2)
ERYTHROCYTE [DISTWIDTH] IN BLOOD BY AUTOMATED COUNT: 12.7 % (ref 12.3–15.4)
FLUAV SUBTYP SPEC NAA+PROBE: NOT DETECTED
FLUBV RNA ISLT QL NAA+PROBE: NOT DETECTED
GEN 5 1HR TROPONIN T REFLEX: 6 NG/L
GLOBULIN UR ELPH-MCNC: 2.6 GM/DL
GLUCOSE SERPL-MCNC: 119 MG/DL (ref 65–99)
HCT VFR BLD AUTO: 46.2 % (ref 37.5–51)
HGB BLD-MCNC: 15.7 G/DL (ref 13–17.7)
IMM GRANULOCYTES # BLD AUTO: 0.04 10*3/MM3 (ref 0–0.05)
IMM GRANULOCYTES NFR BLD AUTO: 0.5 % (ref 0–0.5)
LYMPHOCYTES # BLD AUTO: 2.46 10*3/MM3 (ref 0.7–3.1)
LYMPHOCYTES NFR BLD AUTO: 30.3 % (ref 19.6–45.3)
MCH RBC QN AUTO: 29.6 PG (ref 26.6–33)
MCHC RBC AUTO-ENTMCNC: 34 G/DL (ref 31.5–35.7)
MCV RBC AUTO: 87 FL (ref 79–97)
MONOCYTES # BLD AUTO: 0.75 10*3/MM3 (ref 0.1–0.9)
MONOCYTES NFR BLD AUTO: 9.2 % (ref 5–12)
NEUTROPHILS NFR BLD AUTO: 4.51 10*3/MM3 (ref 1.7–7)
NEUTROPHILS NFR BLD AUTO: 55.6 % (ref 42.7–76)
NRBC BLD AUTO-RTO: 0 /100 WBC (ref 0–0.2)
NT-PROBNP SERPL-MCNC: 48.3 PG/ML (ref 0–450)
PLATELET # BLD AUTO: 160 10*3/MM3 (ref 140–450)
PMV BLD AUTO: 12.5 FL (ref 6–12)
POTASSIUM SERPL-SCNC: 3.8 MMOL/L (ref 3.5–5.2)
PROT SERPL-MCNC: 7.2 G/DL (ref 6–8.5)
QT INTERVAL: 350 MS
QTC INTERVAL: 445 MS
RBC # BLD AUTO: 5.31 10*6/MM3 (ref 4.14–5.8)
RSV RNA NPH QL NAA+NON-PROBE: NOT DETECTED
SARS-COV-2 RNA RESP QL NAA+PROBE: NOT DETECTED
SODIUM SERPL-SCNC: 139 MMOL/L (ref 136–145)
TROPONIN T NUMERIC DELTA: NORMAL
TROPONIN T SERPL HS-MCNC: <6 NG/L
WBC NRBC COR # BLD AUTO: 8.11 10*3/MM3 (ref 3.4–10.8)

## 2025-01-04 PROCEDURE — 93005 ELECTROCARDIOGRAM TRACING: CPT

## 2025-01-04 PROCEDURE — 93005 ELECTROCARDIOGRAM TRACING: CPT | Performed by: EMERGENCY MEDICINE

## 2025-01-04 PROCEDURE — 99285 EMERGENCY DEPT VISIT HI MDM: CPT

## 2025-01-04 PROCEDURE — 80053 COMPREHEN METABOLIC PANEL: CPT | Performed by: EMERGENCY MEDICINE

## 2025-01-04 PROCEDURE — 87637 SARSCOV2&INF A&B&RSV AMP PRB: CPT | Performed by: EMERGENCY MEDICINE

## 2025-01-04 PROCEDURE — 71275 CT ANGIOGRAPHY CHEST: CPT

## 2025-01-04 PROCEDURE — 36415 COLL VENOUS BLD VENIPUNCTURE: CPT

## 2025-01-04 PROCEDURE — 83880 ASSAY OF NATRIURETIC PEPTIDE: CPT | Performed by: EMERGENCY MEDICINE

## 2025-01-04 PROCEDURE — 84484 ASSAY OF TROPONIN QUANT: CPT | Performed by: EMERGENCY MEDICINE

## 2025-01-04 PROCEDURE — 85025 COMPLETE CBC W/AUTO DIFF WBC: CPT | Performed by: EMERGENCY MEDICINE

## 2025-01-04 PROCEDURE — 70450 CT HEAD/BRAIN W/O DYE: CPT

## 2025-01-04 PROCEDURE — 25510000001 IOPAMIDOL PER 1 ML: Performed by: EMERGENCY MEDICINE

## 2025-01-04 RX ORDER — FAMOTIDINE 40 MG/1
40 TABLET, FILM COATED ORAL DAILY
COMMUNITY
End: 2025-01-04 | Stop reason: SDUPTHER

## 2025-01-04 RX ORDER — IOPAMIDOL 755 MG/ML
100 INJECTION, SOLUTION INTRAVASCULAR
Status: COMPLETED | OUTPATIENT
Start: 2025-01-04 | End: 2025-01-04

## 2025-01-04 RX ADMIN — IOPAMIDOL 100 ML: 755 INJECTION, SOLUTION INTRAVENOUS at 08:03

## 2025-01-04 NOTE — ED PROVIDER NOTES
Time: 7:17 AM EST  Date of encounter:  1/4/2025  Independent Historian/Clinical History and Information was obtained by:   Patient    History is limited by: N/A    Chief Complaint: Numbness      History of Present Illness:  Patient is a 39 y.o. year old male who presents to the emergency department for evaluation of numbness.  Patient reports that he was having numbness on the left side of his entire body.  States that it was in spots on his left arm and leg as well as on his face.  States that he just did not feel well.  He denies any numbness at this time.  States that lasted for few hours and then went away.  He also reports when he got up after a playing videogames this evening he was having a little bit of lightheadedness.  He does report that he does have a history of heart issues and has had open heart surgery.  Does report that he has had chest pain but that has been chronic in nature.  Denies any weakness, shortness of breath, headache, speech difficulty.      Patient Care Team  Primary Care Provider: Veronica Chu APRN    Past Medical History:     No Known Allergies  Past Medical History:   Diagnosis Date    Asthma     Bicuspid aortic valve     Broken ankle, left, sequela     HX    CHF (congestive heart failure)     Congenital heart disease     Depression     Dyspnea on exertion     Heart murmur     Heart valve disease     History of migraine     History of transfusion     Hypertension     QUIT TAKING MEDS AGE 19 DUE TO INSURANCE REASONS    Marfan syndrome     Mitral valve prolapse     Scoliosis     TIA (transient ischemic attack)     HX     Past Surgical History:   Procedure Laterality Date    FEMUR SURGERY Right     X2 FOR FRACTURE AGE 11    LYMPH NODE DISSECTION      NECK    MITRAL VALVE REPAIR/REPLACEMENT N/A 2/17/2023    Procedure: JEFFERSON STERNOTOMY MITRAL VALVE REPAIR AND PRP;  Surgeon: Matthew Gann MD;  Location: [facility];  Service: Cardiothoracic;  Laterality: N/A;    TRANSESOPHAGEAL  ECHOCARDIOGRAM (JEFFERSON) N/A 2022    Procedure: TRANSESOPHAGEAL ECHOCARDIOGRAM;  Surgeon: ADRIANNA Bernard MD;  Location: [facility] END[facility]COPY;  Service: Cardiology;  Laterality: N/A;     Family History   Problem Relation Age of Onset    Aortic aneurysm Mother     Heart attack Mother     No Known Problems Father     Malig Hyperthermia Neg Hx        Home Medications:  Prior to Admission medications    Medication Sig Start Date End Date Taking? Authorizing Provider   albuterol sulfate  (90 Base) MCG/ACT inhaler Inhale 2 puffs Every 4 (Four) Hours As Needed for Wheezing or Shortness of Air.  Patient not taking: Reported on 2024 11/15/24   Aurora Quintanilla APRN   Aspirin Low Dose 81 MG EC tablet Take 1 tablet by mouth Daily. 23   Vandana Maxwell MD   azithromycin (Zithromax Z-Ez) 250 MG tablet Take 2 tablets by mouth on day 1, then 1 tablet daily on days 2-5  Patient not taking: Reported on 2024 11/15/24   Aurora Quintanilla APRN   folic acid (FOLVITE) 1 MG tablet Take 1 tablet by mouth Daily. 23   Vandana Maxwell MD   pantoprazole (PROTONIX) 40 MG EC tablet TAKE 1 TABLET BY MOUTH EVERY DAY  Patient not taking: Reported on 2024   Eliazar Phipps MD   propranolol (INDERAL) 10 MG tablet TAKE 1 TABLET BY MOUTH TWICE A DAY 24   Eliazar Phipps MD        Social History:   Social History     Tobacco Use    Smoking status: Some Days     Current packs/day: 0.00     Average packs/day: 0.3 packs/day for 14.0 years (3.5 ttl pk-yrs)     Types: Cigarettes     Start date:      Last attempt to quit:      Years since quittin.0     Passive exposure: Current    Smokeless tobacco: Never    Tobacco comments:     MORE OF SOCIAL SMOKER FOR 8 YEARS     Smoked at most 2 cpd    Vaping Use    Vaping status: Some Days    Start date: 2020    Last attempt to quit: 2023    Substances: Nicotine, Flavoring    Devices: Disposable   Substance Use Topics    Alcohol  "use: Yes     Comment: rarely    Drug use: Never         Review of Systems:  Review of Systems     Physical Exam:  /62   Pulse 77   Temp 98 °F (36.7 °C) (Oral)   Resp 18   Ht 198.1 cm (78\")   Wt 83.5 kg (184 lb 1.4 oz)   SpO2 95%   BMI 21.27 kg/m²     Physical Exam  Vitals and nursing note reviewed.   Constitutional:       Appearance: Normal appearance.   HENT:      Head: Normocephalic and atraumatic.   Eyes:      General: No scleral icterus.  Cardiovascular:      Rate and Rhythm: Normal rate and regular rhythm.      Heart sounds: Normal heart sounds.   Pulmonary:      Effort: Pulmonary effort is normal.      Breath sounds: Normal breath sounds.   Abdominal:      Palpations: Abdomen is soft.      Tenderness: There is no abdominal tenderness.   Musculoskeletal:         General: Normal range of motion.      Cervical back: Normal range of motion.   Skin:     Findings: No rash.   Neurological:      General: No focal deficit present.      Mental Status: He is alert and oriented to person, place, and time.      Cranial Nerves: No cranial nerve deficit.      Motor: No weakness.      Comments: Patient reports some numbness to the thigh as well as ankle and left upper extremity                    Medical Decision Making:      Comorbidities that affect care:    Marfan syndrome, cardiac issues    External Notes reviewed:    Reviewed note from 11/15/2024      The following orders were placed and all results were independently analyzed by me:  Orders Placed This Encounter   Procedures    COVID-19, FLU A/B, RSV PCR 1 HR TAT - Swab, Nasopharynx    CT Head Without Contrast    CT Angiogram Chest Pulmonary Embolism    Comprehensive Metabolic Panel    CBC Auto Differential    High Sensitivity Troponin T    BNP    High Sensitivity Troponin T 1Hr    ECG 12 Lead Stroke Evaluation    CBC & Differential       Medications Given in the Emergency Department:  Medications   iopamidol (ISOVUE-370) 76 % injection 100 mL (100 mL " Intravenous Given 1/4/25 0803)        ED Course:    ED Course as of 01/04/25 0938   Sat Jan 04, 2025   0707 EKG interpreted by me  Time: 0 352  Heart rate 97  Sinus, nonspecific ST changes, right bundle branch block, no acute ischemia [MA]   0937 Recheck patient.  Patient reporting that he has a little bit of numbness on his inner thigh on the left leg as well as around his ankle.  He has no numbness on his face or hands or arms at this time.  States that is been coming and going. [MA]      ED Course User Index  [MA] Randy Marin MD       Labs:    Lab Results (last 24 hours)       Procedure Component Value Units Date/Time    CBC & Differential [[id number]]  (Abnormal) Collected: 01/04/25 0400    Specimen: Blood Updated: 01/04/25 0407    Narrative:      The following orders were created for panel order CBC & Differential.  Procedure                               Abnormality         Status                     ---------                               -----------         ------                     CBC Auto Differential[[id number]]        Abnormal            Final result                 Please view results for these tests on the individual orders.    Comprehensive Metabolic Panel [[id number]]  (Abnormal) Collected: 01/04/25 0400    Specimen: Blood Updated: 01/04/25 0427     Glucose 119 mg/dL      BUN 12 mg/dL      Creatinine 1.01 mg/dL      Sodium 139 mmol/L      Potassium 3.8 mmol/L      Comment: Slight hemolysis detected by analyzer. Result may be falsely elevated.        Chloride 102 mmol/L      CO2 27.1 mmol/L      Calcium 9.5 mg/dL      Total Protein 7.2 g/dL      Albumin 4.6 g/dL      ALT (SGPT) 7 U/L      AST (SGOT) 12 U/L      Alkaline Phosphatase 69 U/L      Total Bilirubin 0.4 mg/dL      Globulin 2.6 gm/dL      A/G Ratio 1.8 g/dL      BUN/Creatinine Ratio 11.9     Anion Gap 9.9 mmol/L      eGFR 95.8 mL/min/1.73     Narrative:      GFR Categories in Chronic Kidney Disease (CKD)      GFR Category          GFR  (mL/min/1.73)    Interpretation  G1                     90 or greater         Normal or high (1)  G2                      60-89                Mild decrease (1)  G3a                   45-59                Mild to moderate decrease  G3b                   30-44                Moderate to severe decrease  G4                    15-29                Severe decrease  G5                    14 or less           Kidney failure          (1)In the absence of evidence of kidney disease, neither GFR category G1 or G2 fulfill the criteria for CKD.    eGFR calculation 2021 CKD-EPI creatinine equation, which does not include race as a factor    CBC Auto Differential [[id number]]  (Abnormal) Collected: 01/04/25 0400    Specimen: Blood Updated: 01/04/25 0407     WBC 8.11 10*3/mm3      RBC 5.31 10*6/mm3      Hemoglobin 15.7 g/dL      Hematocrit 46.2 %      MCV 87.0 fL      MCH 29.6 pg      MCHC 34.0 g/dL      RDW 12.7 %      RDW-SD 40.1 fl      MPV 12.5 fL      Platelets 160 10*3/mm3      Neutrophil % 55.6 %      Lymphocyte % 30.3 %      Monocyte % 9.2 %      Eosinophil % 3.5 %      Basophil % 0.9 %      Immature Grans % 0.5 %      Neutrophils, Absolute 4.51 10*3/mm3      Lymphocytes, Absolute 2.46 10*3/mm3      Monocytes, Absolute 0.75 10*3/mm3      Eosinophils, Absolute 0.28 10*3/mm3      Basophils, Absolute 0.07 10*3/mm3      Immature Grans, Absolute 0.04 10*3/mm3      nRBC 0.0 /100 WBC     High Sensitivity Troponin T [[id number]]  (Normal) Collected: 01/04/25 0400    Specimen: Blood Updated: 01/04/25 0726     HS Troponin T <6 ng/L     Narrative:      High Sensitive Troponin T Reference Range:  <14.0 ng/L- Negative Female for AMI  <22.0 ng/L- Negative Male for AMI  >=14 - Abnormal Female indicating possible myocardial injury.  >=22 - Abnormal Male indicating possible myocardial injury.   Clinicians would have to utilize clinical acumen, EKG, Troponin, and serial changes to determine if it is an Acute Myocardial Infarction or  myocardial injury due to an underlying chronic condition.         BNP [[id number]]  (Normal) Collected: 01/04/25 0400    Specimen: Blood Updated: 01/04/25 0726     proBNP 48.3 pg/mL     Narrative:      This assay is used as an aid in the diagnosis of individuals suspected of having heart failure. It can be used as an aid in the diagnosis of acute decompensated heart failure (ADHF) in patients presenting with signs and symptoms of ADHF to the emergency department (ED). In addition, NT-proBNP of <300 pg/mL indicates ADHF is not likely.    Age Range Result Interpretation  NT-proBNP Concentration (pg/mL:      <50             Positive            >450                   Gray                 300-450                    Negative             <300    50-75           Positive            >900                  Gray                300-900                  Negative            <300      >75             Positive            >1800                  Gray                300-1800                  Negative            <300    COVID-19, FLU A/B, RSV PCR 1 HR TAT - Swab, Nasopharynx [[id number]]  (Normal) Collected: 01/04/25 0815    Specimen: Swab from Nasopharynx Updated: 01/04/25 0901     COVID19 Not Detected     Influenza A PCR Not Detected     Influenza B PCR Not Detected     RSV, PCR Not Detected    Narrative:      Fact sheet for providers: https://www.fda.gov/media/679053/download    Fact sheet for patients: https://www.fda.gov/media/105648/download    Test performed by PCR.    High Sensitivity Troponin T 1Hr [[id number]] Collected: 01/04/25 0820    Specimen: Blood from Arm, Right Updated: 01/04/25 0844     HS Troponin T 6 ng/L      Troponin T Numeric Delta --     Comment: Unable to calculate.       Narrative:      High Sensitive Troponin T Reference Range:  <14.0 ng/L- Negative Female for AMI  <22.0 ng/L- Negative Male for AMI  >=14 - Abnormal Female indicating possible myocardial injury.  >=22 - Abnormal Male indicating possible myocardial  injury.   Clinicians would have to utilize clinical acumen, EKG, Troponin, and serial changes to determine if it is an Acute Myocardial Infarction or myocardial injury due to an underlying chronic condition.                  Imaging:    CT Angiogram Chest Pulmonary Embolism    Result Date: 1/4/2025  CT ANGIOGRAM CHEST PULMONARY EMBOLISM Date of Exam: 1/4/2025 7:57 AM EST Indication: chest pain shortness of breath. Comparison: Chest x-ray 11/15/2024 and CT chest 1/22/2024 Technique: Axial CT images were obtained of the chest after the uneventful intravenous administration of iodinated contrast utilizing pulmonary embolism protocol.  Reconstructed coronal and sagittal images were also obtained. Automated exposure control and iterative construction methods were used. Findings: PULMONARY VASCULATURE: Pulmonary arteries are widely patent without evidence of embolus.Main pulmonary artery is normal in size. No evidence of right heart strain. MEDIASTINUM:Unremarkable. Aortic and heart size are normal. No aortic dissection identified. No mass nor pericardial effusion. Median sternotomy wires and mitral valve repair noted. CORONARY ARTERIES: Nocalcified atherosclerotic disease. LUNGS: Lungs are clear. No consolidation. No significant nodule nor interstitial changes. PLEURAL SPACE: No effusion, mass, nor pneumothorax. LYMPH NODES: There are no pathologically enlarged lymph nodes. UPPER ABDOMEN: There is stable appearance to a low attenuating lesion within the liver too small to characterize. OSSEOUS STRUCTURES: Appropriate for age with no acute process identified.     Impression: 1. No evidence for pulmonary embolus. 2. No acute cardiopulmonary process. Electronically Signed: Amparo Arroyo MD  1/4/2025 8:09 AM EST  Workstation ID: [id number]    CT Head Without Contrast    Result Date: 1/4/2025  CT HEAD WO CONTRAST HISTORY: Left-sided tingling. TECHNIQUE: Axial unenhanced head CT with multiplanar reformats. Radiation dose  reduction techniques included automated exposure control or exposure modulation based on body size. COMPARISON: 2/11/2018 FINDINGS: Ventricular size and configuration are normal. No acute infarct or hemorrhage is identified. There are no masses. There is no skull fracture.     Normal, negative unenhanced head CT. Electronically Signed: Mian Guillaume MD  1/4/2025 5:06 AM EST  Workstation ID: [id number]       Differential Diagnosis and Discussion:    Paresthesia: Differential diagnosis includes but is not limited to acute stroke, electrolyte imbalance, anxiety, radiculopathy, autoimmune disorders, and endocrine disorders.    PROCEDURES:    Labs were collected in the emergency department and all labs were reviewed and interpreted by me.  X-ray were performed in the emergency department and all X-ray impressions were independently interpreted by me.  An EKG was performed and the EKG was interpreted by me.  CT scan was performed in the emergency department and the CT scan radiology impression was interpreted by me.    ECG 12 Lead Stroke Evaluation   Preliminary Result   HEART RATE=97  bpm   RR Zimxyzeq=210  ms   MD Jytppsps=942  ms   P Horizontal Axis=-14  deg   P Front Axis=77  deg   QRSD Interval=93  ms   QT Lfhsjqna=935  ms   FHaG=275  ms   QRS Axis=92  deg   T Wave Axis=67  deg   - ABNORMAL ECG -   Sinus rhythm   Probable left atrial enlargement   Consider right ventricular hypertrophy   Nonspecific T abnrm, anterolateral leads   Date and Time of Study:2025-01-04 03:52:46          Procedures    MDM       Patient is a 39-year-old gentleman with multiple medical problems including Marfan syndrome who presents with complaints of numbness and tingling.  Reports he has been having some intermittent numbness that started last night mainly was on the left side.  He reports that his entire left side but not continuous.  Reports sometimes it is in his leg on the ankle and thigh as well as in the arm and face and forehead.   States it has been coming and going throughout the evening.  Does report that he has been having some intermittent chest pain which he states is chronic in nature.  Labs and imaging here did not show any acute findings.  There is no acute neurological exam deficits.  When I reassessed him he was reporting that he had some numbness on the inner thigh as well as around his left ankle.  This is not continuous in nature and is not consistent with a stroke or significant neurological issue based on my current exam.  Labs and imaging are also unremarkable.  Did report he was having some chest discomfort.  CT does not show any acute findings within the chest.  Labs are also unremarkable.  He is resting comfortably in the room with no acute distress.  I recommend that he follow-up with his primary care doctor.  He also reports that he has had reflux in the past.  Currently only on famotidine.  Will switch to PPI.  Will DC and have patient follow-up as outpatient.  Strong return precautions given.              Patient Care Considerations:          Consultants/Shared Management Plan:    None    Social Determinants of Health:    Patient is independent, reliable, and has access to care.       Disposition and Care Coordination:    Discharged: I considered escalation of care by admitting this patient to the hospital, however patient has intermittent paresthesias and no acute findings noted on workup    I have explained the patient´s condition, diagnoses and treatment plan based on the information available to me at this time. I have answered questions and addressed any concerns. The patient has a good  understanding of the patient´s diagnosis, condition, and treatment plan as can be expected at this point. The vital signs have been stable. The patient´s condition is stable and appropriate for discharge from the emergency department.      The patient will pursue further outpatient evaluation with the primary care physician or  other designated or consulting physician as outlined in the discharge instructions. They are agreeable to this plan of care and follow-up instructions have been explained in detail. The patient has received these instructions in written format and have expressed an understanding of the discharge instructions. The patient is aware that any significant change in condition or worsening of symptoms should prompt an immediate return to this or the closest emergency department or call to 911.      Final diagnoses:   Paresthesia        ED Disposition       ED Disposition   Discharge    Condition   Stable    Comment   --               This medical record created using voice recognition software.             Randy Marin MD  01/04/25 0966

## 2025-01-13 LAB
QT INTERVAL: 350 MS
QTC INTERVAL: 445 MS

## 2025-03-14 ENCOUNTER — OFFICE VISIT (OUTPATIENT)
Dept: CARDIOLOGY | Facility: CLINIC | Age: 40
End: 2025-03-14
Payer: MEDICAID

## 2025-03-14 VITALS
WEIGHT: 187 LBS | HEART RATE: 91 BPM | HEIGHT: 78 IN | BODY MASS INDEX: 21.64 KG/M2 | DIASTOLIC BLOOD PRESSURE: 81 MMHG | SYSTOLIC BLOOD PRESSURE: 116 MMHG

## 2025-03-14 DIAGNOSIS — Q23.81 BICUSPID AORTIC VALVE: Primary | ICD-10-CM

## 2025-03-14 DIAGNOSIS — R00.2 PALPITATIONS: ICD-10-CM

## 2025-03-14 DIAGNOSIS — I34.0 SEVERE MITRAL REGURGITATION: ICD-10-CM

## 2025-03-14 PROCEDURE — 99214 OFFICE O/P EST MOD 30 MIN: CPT | Performed by: INTERNAL MEDICINE

## 2025-03-14 RX ORDER — OMEPRAZOLE 40 MG/1
40 CAPSULE, DELAYED RELEASE ORAL DAILY
Qty: 60 CAPSULE | Refills: 11 | Status: SHIPPED | OUTPATIENT
Start: 2025-03-14

## 2025-03-14 NOTE — PROGRESS NOTES
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  When he is walking around he notes no chest pain but does note some fatigue.  He does not have a regular exercise program.  He states he has had some palpitations but they are not bothering him.  He does note he has been under high degree of stress.      Past Medical History:   Diagnosis Date    Asthma     Bicuspid aortic valve     Broken ankle, left, sequela     HX    CHF (congestive heart failure)     Congenital heart disease     Depression     Dyspnea on exertion     Heart murmur     Heart valve disease     History of migraine     History of transfusion     Hypertension     QUIT TAKING MEDS AGE 19 DUE TO INSURANCE REASONS    Marfan syndrome     Mitral valve prolapse     Scoliosis     TIA (transient ischemic attack)     HX         Past Surgical History:   Procedure Laterality Date    FEMUR SURGERY Right     X2 FOR FRACTURE AGE 11    LYMPH NODE DISSECTION      NECK    MITRAL VALVE REPAIR/REPLACEMENT N/A 2023    Procedure: JEFFERSON STERNOTOMY MITRAL VALVE REPAIR AND PRP;  Surgeon: Matthew Gann MD;  Location: [facility];  Service: Cardiothoracic;  Laterality: N/A;    TRANSESOPHAGEAL ECHOCARDIOGRAM (JEFFERSON) N/A 2022    Procedure: TRANSESOPHAGEAL ECHOCARDIOGRAM;  Surgeon: ADRIANNA Bernard MD;  Location: [facility];  Service: Cardiology;  Laterality: N/A;          Social History     Socioeconomic History    Marital status: Single   Tobacco Use    Smoking status: Former     Current packs/day: 0.00     Average packs/day: 0.3 packs/day for 14.0 years (3.5 ttl pk-yrs)     Types: Cigarettes     Start date:      Quit date:      Years since quittin.2     Passive exposure: Current    Smokeless tobacco: Never    Tobacco comments:      "MORE OF SOCIAL SMOKER FOR 8 YEARS     Smoked at most 2 cpd    Vaping Use    Vaping status: Some Days    Start date: 2/1/2020    Last attempt to quit: 2/1/2023    Substances: Nicotine, Flavoring    Devices: Disposable   Substance and Sexual Activity    Alcohol use: Yes     Comment: rarely    Drug use: Never    Sexual activity: Defer         Family History   Problem Relation Age of Onset    Aortic aneurysm Mother     Heart attack Mother     No Known Problems Father     Malig Hyperthermia Neg Hx           No Known Allergies         Current Outpatient Medications:     Aspirin Low Dose 81 MG EC tablet, Take 1 tablet by mouth Daily., Disp: , Rfl:     folic acid (FOLVITE) 1 MG tablet, Take 1 tablet by mouth Daily., Disp: , Rfl:     propranolol (INDERAL) 10 MG tablet, TAKE 1 TABLET BY MOUTH TWICE A DAY, Disp: 180 tablet, Rfl: 1    albuterol sulfate  (90 Base) MCG/ACT inhaler, Inhale 2 puffs Every 4 (Four) Hours As Needed for Wheezing or Shortness of Air. (Patient not taking: Reported on 3/14/2025), Disp: 6.7 g, Rfl: 0    omeprazole (priLOSEC) 40 MG capsule, Take 1 capsule by mouth Daily., Disp: 60 capsule, Rfl: 11      ROS:  Cardiac review of systems positive for rare palpitation.    Objective     /81   Pulse 91   Ht 198.1 cm (78\")   Wt 84.8 kg (187 lb)   BMI 21.61 kg/m²       General Appearance:   well developed  well nourished  HENT:   oropharynx moist  lips not cyanotic  Respiratory:  no respiratory distress  normal breath sounds  no rales  Cardiovascular:  no jugular venous distention  regular rhythm  S1 normal, S2 normal  no S3, no S4   no murmur  no rub, no thrill  No carotid bruit  pedal pulses normal  lower extremity edema: none    Musculoskeletal:  no clubbing of fingers.   normocephalic, head atraumatic  Skin:   warm, dry  Psychiatric:  judgement and insight appropriate  normal mood and affect    ECHO:  Results for orders placed during the hospital encounter of 10/04/24    Adult Transthoracic Echo " Complete w/ Color, Spectral and Contrast if necessary per protocol    Interpretation Summary    Left ventricular ejection fraction appears to be 51 - 55%.  Inappropriate septal motion is noted.    Left ventricular diastolic function was normal.    Known apparent bicuspid aortic valve.  Mild aortic insufficiency.  No significant aortic stenosis with max/mean pressure gradients 4/2 mmHg.    Status post mitral valve repair.  Trace to mild mitral regurgitation.  Max/mean pressure gradient 2.3/1.6 mmHg.    Aortic root and ascending aorta measured normal size.    STRESS:  Results for orders placed during the hospital encounter of 09/14/22    Stress Test With Myocardial Perfusion One Day    Interpretation Summary  · Left ventricular ejection fraction is normal. (Calculated EF = 53%).  · Myocardial perfusion imaging indicates a normal myocardial perfusion study with no evidence of ischemia.  · Impressions are consistent with a low risk study.  · Findings consistent with a normal ECG stress test.    CATH:  No results found for this or any previous visit.    BMP:     Glucose   Date Value Ref Range Status   01/04/2025 119 (H) 65 - 99 mg/dL Final     BUN   Date Value Ref Range Status   01/04/2025 12 6 - 20 mg/dL Final     Creatinine   Date Value Ref Range Status   01/04/2025 1.01 0.76 - 1.27 mg/dL Final     Sodium   Date Value Ref Range Status   01/04/2025 139 136 - 145 mmol/L Final     Potassium   Date Value Ref Range Status   01/04/2025 3.8 3.5 - 5.2 mmol/L Final     Comment:     Slight hemolysis detected by analyzer. Result may be falsely elevated.     Chloride   Date Value Ref Range Status   01/04/2025 102 98 - 107 mmol/L Final     CO2   Date Value Ref Range Status   01/04/2025 27.1 22.0 - 29.0 mmol/L Final     Calcium   Date Value Ref Range Status   01/04/2025 9.5 8.6 - 10.5 mg/dL Final     BUN/Creatinine Ratio   Date Value Ref Range Status   01/04/2025 11.9 7.0 - 25.0 Final     Anion Gap   Date Value Ref Range Status    01/04/2025 9.9 5.0 - 15.0 mmol/L Final     eGFR   Date Value Ref Range Status   01/04/2025 95.8 >60.0 mL/min/1.73 Final     LIPIDS:  Total Cholesterol   Date Value Ref Range Status   02/15/2023 146 0 - 200 mg/dL Final     Triglycerides   Date Value Ref Range Status   02/15/2023 71 0 - 150 mg/dL Final     HDL Cholesterol   Date Value Ref Range Status   02/15/2023 47 40 - 60 mg/dL Final     LDL Cholesterol    Date Value Ref Range Status   02/15/2023 85 0 - 100 mg/dL Final     VLDL Cholesterol   Date Value Ref Range Status   02/15/2023 14 5 - 40 mg/dL Final     LDL/HDL Ratio   Date Value Ref Range Status   02/15/2023 1.80  Final         Procedures             ASSESSMENT:  Diagnoses and all orders for this visit:    1. Bicuspid aortic valve (Primary)  -     Adult Transthoracic Echo Complete W/ Cont if Necessary Per Protocol; Future    2. Severe mitral regurgitation  -     Adult Transthoracic Echo Complete W/ Cont if Necessary Per Protocol; Future    3. Palpitations    Other orders  -     omeprazole (priLOSEC) 40 MG capsule; Take 1 capsule by mouth Daily.  Dispense: 60 capsule; Refill: 11         PLAN:    1.  Continue the propranolol.  Patient's palpitations are overall manageable.  2.  Blood pressures under good control.  3.  Will repeat an echocardiogram in May 2025 which will be 2 years from previous looking at his bicuspid aortic valve and mitral valve repair.  4.  Reviewed patient's emergency department visit from 1/4/2025 due to the left-sided numbness including the face.  He had CT PE negative, CT head negative, EKG with normal sinus rhythm and 2 sets of troponins negative  5.  Encouraged the patient to start a regular exercise program.      Return in about 1 year (around 3/14/2026).     Patient was given instructions and counseling regarding his condition or for health maintenance advice. Please see specific information pulled into the AVS if appropriate.         Eliazar Phipps MD   3/14/2025  14:27  EDT

## 2025-07-31 ENCOUNTER — HOSPITAL ENCOUNTER (EMERGENCY)
Facility: HOSPITAL | Age: 40
Discharge: HOME | End: 2025-08-01
Attending: EMERGENCY MEDICINE
Payer: MEDICAID

## 2025-07-31 DIAGNOSIS — E86.0 DEHYDRATION: Primary | ICD-10-CM

## 2025-07-31 DIAGNOSIS — F41.1 ANXIETY REACTION: ICD-10-CM

## 2025-07-31 DIAGNOSIS — T67.9XXA HEAT EXPOSURE, INITIAL ENCOUNTER: ICD-10-CM

## 2025-07-31 LAB
ALBUMIN SERPL-MCNC: 4.8 G/DL (ref 3.5–5.2)
ALBUMIN/GLOB SERPL: 2 G/DL
ALP SERPL-CCNC: 71 U/L (ref 39–117)
ALT SERPL W P-5'-P-CCNC: 11 U/L (ref 1–41)
ANION GAP SERPL CALCULATED.3IONS-SCNC: 11.4 MMOL/L (ref 5–15)
AST SERPL-CCNC: 17 U/L (ref 1–40)
BASOPHILS # BLD AUTO: 0.06 10*3/MM3 (ref 0–0.2)
BASOPHILS NFR BLD AUTO: 0.7 % (ref 0–1.5)
BILIRUB SERPL-MCNC: 0.5 MG/DL (ref 0–1.2)
BUN SERPL-MCNC: 11 MG/DL (ref 6–20)
BUN/CREAT SERPL: 9.2 (ref 7–25)
CALCIUM SPEC-SCNC: 9.3 MG/DL (ref 8.6–10.5)
CHLORIDE SERPL-SCNC: 105 MMOL/L (ref 98–107)
CK SERPL-CCNC: 237 U/L (ref 20–200)
CO2 SERPL-SCNC: 25.6 MMOL/L (ref 22–29)
CREAT SERPL-MCNC: 1.2 MG/DL (ref 0.76–1.27)
DEPRECATED RDW RBC AUTO: 42.9 FL (ref 37–54)
EGFRCR SERPLBLD CKD-EPI 2021: 77.9 ML/MIN/1.73
EOSINOPHIL # BLD AUTO: 0.15 10*3/MM3 (ref 0–0.4)
EOSINOPHIL NFR BLD AUTO: 1.8 % (ref 0.3–6.2)
ERYTHROCYTE [DISTWIDTH] IN BLOOD BY AUTOMATED COUNT: 13.2 % (ref 12.3–15.4)
GLOBULIN UR ELPH-MCNC: 2.4 GM/DL
GLUCOSE SERPL-MCNC: 97 MG/DL (ref 65–99)
HCT VFR BLD AUTO: 45.1 % (ref 37.5–51)
HGB BLD-MCNC: 15.4 G/DL (ref 13–17.7)
HOLD SPECIMEN: NORMAL
HOLD SPECIMEN: NORMAL
IMM GRANULOCYTES # BLD AUTO: 0.03 10*3/MM3 (ref 0–0.05)
IMM GRANULOCYTES NFR BLD AUTO: 0.4 % (ref 0–0.5)
LYMPHOCYTES # BLD AUTO: 1.42 10*3/MM3 (ref 0.7–3.1)
LYMPHOCYTES NFR BLD AUTO: 16.8 % (ref 19.6–45.3)
MAGNESIUM SERPL-MCNC: 2.1 MG/DL (ref 1.6–2.6)
MCH RBC QN AUTO: 30.6 PG (ref 26.6–33)
MCHC RBC AUTO-ENTMCNC: 34.1 G/DL (ref 31.5–35.7)
MCV RBC AUTO: 89.5 FL (ref 79–97)
MONOCYTES # BLD AUTO: 0.53 10*3/MM3 (ref 0.1–0.9)
MONOCYTES NFR BLD AUTO: 6.3 % (ref 5–12)
NEUTROPHILS NFR BLD AUTO: 6.25 10*3/MM3 (ref 1.7–7)
NEUTROPHILS NFR BLD AUTO: 74 % (ref 42.7–76)
NRBC BLD AUTO-RTO: 0 /100 WBC (ref 0–0.2)
PLATELET # BLD AUTO: 133 10*3/MM3 (ref 140–450)
PMV BLD AUTO: 12.9 FL (ref 6–12)
POTASSIUM SERPL-SCNC: 3.9 MMOL/L (ref 3.5–5.2)
PROT SERPL-MCNC: 7.2 G/DL (ref 6–8.5)
QT INTERVAL: 376 MS
QTC INTERVAL: 428 MS
RBC # BLD AUTO: 5.04 10*6/MM3 (ref 4.14–5.8)
SODIUM SERPL-SCNC: 142 MMOL/L (ref 136–145)
TROPONIN T SERPL HS-MCNC: <6 NG/L
WBC NRBC COR # BLD AUTO: 8.44 10*3/MM3 (ref 3.4–10.8)
WHOLE BLOOD HOLD COAG: NORMAL
WHOLE BLOOD HOLD SPECIMEN: NORMAL

## 2025-07-31 PROCEDURE — 36415 COLL VENOUS BLD VENIPUNCTURE: CPT

## 2025-07-31 PROCEDURE — 93005 ELECTROCARDIOGRAM TRACING: CPT

## 2025-07-31 PROCEDURE — 84484 ASSAY OF TROPONIN QUANT: CPT | Performed by: EMERGENCY MEDICINE

## 2025-07-31 PROCEDURE — 83735 ASSAY OF MAGNESIUM: CPT | Performed by: EMERGENCY MEDICINE

## 2025-07-31 PROCEDURE — 82550 ASSAY OF CK (CPK): CPT | Performed by: NURSE PRACTITIONER

## 2025-07-31 PROCEDURE — 93005 ELECTROCARDIOGRAM TRACING: CPT | Performed by: EMERGENCY MEDICINE

## 2025-07-31 PROCEDURE — 99284 EMERGENCY DEPT VISIT MOD MDM: CPT

## 2025-07-31 PROCEDURE — 80053 COMPREHEN METABOLIC PANEL: CPT | Performed by: EMERGENCY MEDICINE

## 2025-07-31 PROCEDURE — 85025 COMPLETE CBC W/AUTO DIFF WBC: CPT

## 2025-07-31 RX ORDER — DIAZEPAM 10 MG/2ML
2.5 INJECTION, SOLUTION INTRAMUSCULAR; INTRAVENOUS ONCE
Status: COMPLETED | OUTPATIENT
Start: 2025-08-01 | End: 2025-08-01

## 2025-07-31 RX ORDER — SODIUM CHLORIDE 0.9 % (FLUSH) 0.9 %
10 SYRINGE (ML) INJECTION AS NEEDED
Status: DISCONTINUED | OUTPATIENT
Start: 2025-07-31 | End: 2025-08-01 | Stop reason: HOSPADM

## 2025-07-31 NOTE — Clinical Note
[facility] EMERGENCY ROOM  [address] [place] [address]  Phone: [phone]  Fax: [phone]    Regino Fox was seen and treated in our emergency department on 7/31/2025.  He may return to work on 08/04/2025.         Thank you for choosing [facility].    Leon Beavers MD

## 2025-08-01 ENCOUNTER — OFFICE VISIT (OUTPATIENT)
Dept: CARDIOLOGY | Facility: CLINIC | Age: 40
End: 2025-08-01
Payer: MEDICAID

## 2025-08-01 VITALS
DIASTOLIC BLOOD PRESSURE: 80 MMHG | SYSTOLIC BLOOD PRESSURE: 119 MMHG | OXYGEN SATURATION: 96 % | RESPIRATION RATE: 16 BRPM | TEMPERATURE: 98.1 F | HEIGHT: 78 IN | WEIGHT: 188.93 LBS | BODY MASS INDEX: 21.86 KG/M2 | HEART RATE: 72 BPM

## 2025-08-01 VITALS
DIASTOLIC BLOOD PRESSURE: 81 MMHG | HEIGHT: 78 IN | SYSTOLIC BLOOD PRESSURE: 112 MMHG | HEART RATE: 77 BPM | WEIGHT: 188.2 LBS | OXYGEN SATURATION: 99 % | BODY MASS INDEX: 21.78 KG/M2

## 2025-08-01 DIAGNOSIS — Q23.81 BICUSPID AORTIC VALVE: ICD-10-CM

## 2025-08-01 DIAGNOSIS — R00.2 PALPITATIONS: ICD-10-CM

## 2025-08-01 DIAGNOSIS — I34.0 SEVERE MITRAL REGURGITATION: ICD-10-CM

## 2025-08-01 DIAGNOSIS — R55 SYNCOPE AND COLLAPSE: Primary | ICD-10-CM

## 2025-08-01 LAB
AMPHET+METHAMPHET UR QL: NEGATIVE
AMPHETAMINES UR QL: NEGATIVE
BARBITURATES UR QL SCN: NEGATIVE
BENZODIAZ UR QL SCN: NEGATIVE
BILIRUB UR QL STRIP: NEGATIVE
BUPRENORPHINE SERPL-MCNC: NEGATIVE NG/ML
CANNABINOIDS SERPL QL: NEGATIVE
CLARITY UR: CLEAR
COCAINE UR QL: NEGATIVE
COLOR UR: YELLOW
FENTANYL UR-MCNC: NEGATIVE NG/ML
GEN 5 1HR TROPONIN T REFLEX: <6 NG/L
GLUCOSE UR STRIP-MCNC: NEGATIVE MG/DL
HGB UR QL STRIP.AUTO: NEGATIVE
KETONES UR QL STRIP: NEGATIVE
LEUKOCYTE ESTERASE UR QL STRIP.AUTO: NEGATIVE
METHADONE UR QL SCN: NEGATIVE
NITRITE UR QL STRIP: NEGATIVE
OPIATES UR QL: NEGATIVE
OXYCODONE UR QL SCN: NEGATIVE
PCP UR QL SCN: NEGATIVE
PH UR STRIP.AUTO: 8 [PH] (ref 5–8)
PROT UR QL STRIP: NEGATIVE
SP GR UR STRIP: 1.01 (ref 1–1.03)
TRICYCLICS UR QL SCN: NEGATIVE
TROPONIN T NUMERIC DELTA: NORMAL
UROBILINOGEN UR QL STRIP: NORMAL

## 2025-08-01 PROCEDURE — 36415 COLL VENOUS BLD VENIPUNCTURE: CPT

## 2025-08-01 PROCEDURE — 80307 DRUG TEST PRSMV CHEM ANLYZR: CPT | Performed by: NURSE PRACTITIONER

## 2025-08-01 PROCEDURE — 25810000003 SODIUM CHLORIDE 0.9 % SOLUTION: Performed by: EMERGENCY MEDICINE

## 2025-08-01 PROCEDURE — 99214 OFFICE O/P EST MOD 30 MIN: CPT | Performed by: NURSE PRACTITIONER

## 2025-08-01 PROCEDURE — 96374 THER/PROPH/DIAG INJ IV PUSH: CPT

## 2025-08-01 PROCEDURE — 25010000002 DIAZEPAM PER 5 MG: Performed by: EMERGENCY MEDICINE

## 2025-08-01 PROCEDURE — 81003 URINALYSIS AUTO W/O SCOPE: CPT | Performed by: NURSE PRACTITIONER

## 2025-08-01 PROCEDURE — 84484 ASSAY OF TROPONIN QUANT: CPT | Performed by: EMERGENCY MEDICINE

## 2025-08-01 RX ADMIN — SODIUM CHLORIDE 1000 ML: 9 INJECTION, SOLUTION INTRAVENOUS at 01:01

## 2025-08-01 RX ADMIN — DIAZEPAM 2.5 MG: 10 INJECTION, SOLUTION INTRAMUSCULAR; INTRAVENOUS at 01:02

## 2025-08-01 NOTE — ED PROVIDER NOTES
"Time: 11:03 PM EDT  Date of encounter:  2025  Independent Historian/Clinical History and Information was obtained by:   Patient and Family    History is limited by: N/A    Chief Complaint: Numbness      History of Present Illness:  Patient is a 39 y.o. year old male who presents to the emergency department for evaluation of numbness    Patient states he was traveling home from [place] when he realized the air conditioning was not working and he began to feel overheated.  He states this set off a cascade of events with numerous symptoms including a abnormal sensation over his face that felt wet or as though he had \"eaten fruit.\"  He also noticed that he had burning in his bilateral nostrils.  He had a full sensation in his left chest and occasional gurgling sensation.  He had intermittent numbness over his whole body.  He states that all the symptoms contributed to his anxiety.  He states he feels somewhat improved at this time but some of his symptoms remain.  He denies any pain.    Patient and his family state that the patient has a history of Marfan's and cardiac disease and 2 of his brothers have  due to similar conditions.  These concerns have contributed to the patient's anxiety.      Patient Care Team  Primary Care Provider: Veronica Chu APRN    Past Medical History:     No Known Allergies  Past Medical History:   Diagnosis Date    Asthma     Bicuspid aortic valve     Broken ankle, left, sequela     HX    CHF (congestive heart failure)     Congenital heart disease     Depression     Dyspnea on exertion     Heart murmur     Heart valve disease     History of migraine     History of transfusion     Hypertension     QUIT TAKING MEDS AGE 19 DUE TO INSURANCE REASONS    Marfan syndrome     Mitral valve prolapse     Scoliosis     TIA (transient ischemic attack)     HX     Past Surgical History:   Procedure Laterality Date    FEMUR SURGERY Right     X2 FOR FRACTURE AGE 11    LYMPH NODE DISSECTION      " NECK    MITRAL VALVE REPAIR/REPLACEMENT N/A 2023    Procedure: JEFFERSON STERNOTOMY MITRAL VALVE REPAIR AND PRP;  Surgeon: Matthew Gann MD;  Location: [facility];  Service: Cardiothoracic;  Laterality: N/A;    TRANSESOPHAGEAL ECHOCARDIOGRAM (JEFFERSON) N/A 2022    Procedure: TRANSESOPHAGEAL ECHOCARDIOGRAM;  Surgeon: ADRIANNA Bernard MD;  Location:  [facility];  Service: Cardiology;  Laterality: N/A;     Family History   Problem Relation Age of Onset    Aortic aneurysm Mother     Heart attack Mother     No Known Problems Father     Malig Hyperthermia Neg Hx        Home Medications:  Prior to Admission medications    Medication Sig Start Date End Date Taking? Authorizing Provider   Aspirin Low Dose 81 MG EC tablet Take 1 tablet by mouth Daily. 23  Yes Vandana Maxwell MD   folic acid (FOLVITE) 1 MG tablet Take 1 tablet by mouth Daily. 23  Yes Vandana Maxwell MD   omeprazole (priLOSEC) 40 MG capsule Take 1 capsule by mouth Daily. 3/14/25  Yes Eliazar Phipps MD   propranolol (INDERAL) 10 MG tablet TAKE 1 TABLET BY MOUTH TWICE A DAY 24  Yes Eliazar Phipps MD   albuterol sulfate  (90 Base) MCG/ACT inhaler Inhale 2 puffs Every 4 (Four) Hours As Needed for Wheezing or Shortness of Air.  Patient not taking: Reported on 2024 11/15/24   Aurora Quintanilla APRN        Social History:   Social History     Tobacco Use    Smoking status: Former     Current packs/day: 0.00     Average packs/day: 0.3 packs/day for 14.0 years (3.5 ttl pk-yrs)     Types: Cigarettes     Start date:      Quit date:      Years since quittin.5     Passive exposure: Current    Smokeless tobacco: Never    Tobacco comments:     MORE OF SOCIAL SMOKER FOR 8 YEARS     Smoked at most 2 cpd    Vaping Use    Vaping status: Some Days    Start date: 2020    Last attempt to quit: 2023    Substances: Nicotine, Flavoring    Devices: Disposable   Substance Use Topics    Alcohol use: Yes      "Comment: rarely    Drug use: Never         Review of Systems:  Review of Systems   Constitutional:  Negative for chills and fever.   HENT:  Negative for congestion, ear pain and sore throat.    Eyes:  Negative for pain.   Respiratory:  Negative for cough, chest tightness and shortness of breath.    Cardiovascular:  Negative for chest pain.   Gastrointestinal:  Negative for abdominal pain, diarrhea, nausea and vomiting.   Genitourinary:  Negative for flank pain and hematuria.   Musculoskeletal:  Positive for arthralgias. Negative for joint swelling.   Skin:  Negative for pallor.   Neurological:  Positive for numbness. Negative for seizures and headaches.   Psychiatric/Behavioral:  The patient is nervous/anxious.    All other systems reviewed and are negative.       Physical Exam:  /80 (BP Location: Left arm, Patient Position: Sitting)   Pulse 72   Temp 98.1 °F (36.7 °C) (Oral)   Resp 16   Ht 198.1 cm (78\")   Wt 85.7 kg (188 lb 15 oz)   SpO2 96%   BMI 21.83 kg/m²     Physical Exam  Vitals and nursing note reviewed.   Constitutional:       General: He is not in acute distress.     Appearance: Normal appearance. He is not toxic-appearing.   HENT:      Head: Normocephalic and atraumatic.      Jaw: There is normal jaw occlusion.   Eyes:      General: Lids are normal.      Extraocular Movements: Extraocular movements intact.      Conjunctiva/sclera: Conjunctivae normal.      Pupils: Pupils are equal, round, and reactive to light.   Cardiovascular:      Rate and Rhythm: Normal rate and regular rhythm.      Pulses: Normal pulses.      Heart sounds: Normal heart sounds.   Pulmonary:      Effort: Pulmonary effort is normal. No respiratory distress.      Breath sounds: Normal breath sounds. No wheezing or rhonchi.   Abdominal:      General: Abdomen is flat.      Palpations: Abdomen is soft.      Tenderness: There is no abdominal tenderness. There is no guarding or rebound.   Musculoskeletal:         General: Normal " range of motion.      Cervical back: Normal range of motion and neck supple.      Right lower leg: No edema.      Left lower leg: No edema.   Skin:     General: Skin is warm and dry.   Neurological:      Mental Status: He is alert and oriented to person, place, and time. Mental status is at baseline.   Psychiatric:         Mood and Affect: Mood normal.      Comments: Patient appears mildly anxious with pressured speech.             Medical Decision Making:      Comorbidities that affect care:    CHF, valvular heart disease, hypertension, Marfan syndrome, TIA, depression    External Notes reviewed:    Previous Clinic Note: Outpatient cardiology visit bicuspid aortic valve 3/14/2025      The following orders were placed and all results were independently analyzed by me:  Orders Placed This Encounter   Procedures    [facility] Draw    Comprehensive Metabolic Panel    Magnesium    High Sensitivity Troponin T    CBC Auto Differential    CK    Urinalysis With Microscopic If Indicated (No Culture) - Urine, Clean Catch    Urine Drug Screen - Urine, Clean Catch    High Sensitivity Troponin T 1Hr    Fentanyl, Urine - Urine, Clean Catch    Undress & Gown    Continuous Pulse Oximetry    Vital Signs    Orthostatic Blood Pressure    P.o. fluids  Misc Nursing Order (Specify)    POC Glucose Once    ECG 12 Lead Syncope    CBC & Differential    Green Top (Gel)    Lavender Top    Gold Top - SST    Light Blue Top       Medications Given in the Emergency Department:  Medications   sodium chloride 0.9 % bolus 1,000 mL (0 mL Intravenous Stopped 8/1/25 0142)   diazePAM (VALIUM) injection 2.5 mg (2.5 mg Intravenous Given 8/1/25 0102)        ED Course:    ED Course as of 08/01/25 0629   Thu Jul 31, 2025   2306 My interpretation of EKG: Sinus rhythm 78, no acute ischemia [JS]      ED Course User Index  [JS] Leon Beavers MD       Labs:    Lab Results (last 24 hours)       Procedure Component Value Units Date/Time    CBC & Differential  [[id number]]  (Abnormal) Collected: 07/31/25 2251    Specimen: Blood Updated: 07/31/25 2257    Narrative:      The following orders were created for panel order CBC & Differential.  Procedure                               Abnormality         Status                     ---------                               -----------         ------                     CBC Auto Differential[[id number]]        Abnormal            Final result                 Please view results for these tests on the individual orders.    Comprehensive Metabolic Panel [[id number]] Collected: 07/31/25 2251    Specimen: Blood Updated: 07/31/25 2334     Glucose 97 mg/dL      BUN 11.0 mg/dL      Creatinine 1.20 mg/dL      Sodium 142 mmol/L      Potassium 3.9 mmol/L      Chloride 105 mmol/L      CO2 25.6 mmol/L      Calcium 9.3 mg/dL      Total Protein 7.2 g/dL      Albumin 4.8 g/dL      ALT (SGPT) 11 U/L      AST (SGOT) 17 U/L      Alkaline Phosphatase 71 U/L      Total Bilirubin 0.5 mg/dL      Globulin 2.4 gm/dL      A/G Ratio 2.0 g/dL      BUN/Creatinine Ratio 9.2     Anion Gap 11.4 mmol/L      eGFR 77.9 mL/min/1.73     Narrative:      GFR Categories in Chronic Kidney Disease (CKD)              GFR Category          GFR (mL/min/1.73)    Interpretation  G1                    90 or greater        Normal or high (1)  G2                    60-89                Mild decrease (1)  G3a                   45-59                Mild to moderate decrease  G3b                   30-44                Moderate to severe decrease  G4                    15-29                Severe decrease  G5                    14 or less           Kidney failure    (1)In the absence of evidence of kidney disease, neither GFR category G1 or G2 fulfill the criteria for CKD.    eGFR calculation 2021 CKD-EPI creatinine equation, which does not include race as a factor    Magnesium [[id number]]  (Normal) Collected: 07/31/25 2251    Specimen: Blood Updated: 07/31/25 2334     Magnesium 2.1  mg/dL     High Sensitivity Troponin T [[id number]]  (Normal) Collected: 07/31/25 2251    Specimen: Blood Updated: 07/31/25 2334     HS Troponin T <6 ng/L     Narrative:      High Sensitive Troponin T Reference Range:  <14.0 ng/L- Negative Female for AMI  <22.0 ng/L- Negative Male for AMI  >=14 - Abnormal Female indicating possible myocardial injury.  >=22 - Abnormal Male indicating possible myocardial injury.   Clinicians would have to utilize clinical acumen, EKG, Troponin, and serial changes to determine if it is an Acute Myocardial Infarction or myocardial injury due to an underlying chronic condition.         CBC Auto Differential [[id number]]  (Abnormal) Collected: 07/31/25 2251    Specimen: Blood Updated: 07/31/25 2257     WBC 8.44 10*3/mm3      RBC 5.04 10*6/mm3      Hemoglobin 15.4 g/dL      Hematocrit 45.1 %      MCV 89.5 fL      MCH 30.6 pg      MCHC 34.1 g/dL      RDW 13.2 %      RDW-SD 42.9 fl      MPV 12.9 fL      Platelets 133 10*3/mm3      Neutrophil % 74.0 %      Lymphocyte % 16.8 %      Monocyte % 6.3 %      Eosinophil % 1.8 %      Basophil % 0.7 %      Immature Grans % 0.4 %      Neutrophils, Absolute 6.25 10*3/mm3      Lymphocytes, Absolute 1.42 10*3/mm3      Monocytes, Absolute 0.53 10*3/mm3      Eosinophils, Absolute 0.15 10*3/mm3      Basophils, Absolute 0.06 10*3/mm3      Immature Grans, Absolute 0.03 10*3/mm3      nRBC 0.0 /100 WBC     CK [[id number]]  (Abnormal) Collected: 07/31/25 2251    Specimen: Blood Updated: 07/31/25 2334     Creatine Kinase 237 U/L     High Sensitivity Troponin T 1Hr [[id number]] Collected: 08/01/25 0000    Specimen: Blood Updated: 08/01/25 0032     HS Troponin T <6 ng/L      Troponin T Numeric Delta --     Comment: Unable to calculate.       Narrative:      High Sensitive Troponin T Reference Range:  <14.0 ng/L- Negative Female for AMI  <22.0 ng/L- Negative Male for AMI  >=14 - Abnormal Female indicating possible myocardial injury.  >=22 - Abnormal Male indicating  possible myocardial injury.   Clinicians would have to utilize clinical acumen, EKG, Troponin, and serial changes to determine if it is an Acute Myocardial Infarction or myocardial injury due to an underlying chronic condition.         Urinalysis With Microscopic If Indicated (No Culture) - Urine, Clean Catch [[id number]]  (Normal) Collected: 08/01/25 0043    Specimen: Urine, Clean Catch Updated: 08/01/25 0056     Color, UA Yellow     Appearance, UA Clear     pH, UA 8.0     Specific Gravity, UA 1.007     Glucose, UA Negative     Ketones, UA Negative     Bilirubin, UA Negative     Blood, UA Negative     Protein, UA Negative     Leuk Esterase, UA Negative     Nitrite, UA Negative     Urobilinogen, UA 1.0 E.U./dL    Narrative:      Urine microscopic not indicated.    Urine Drug Screen - Urine, Clean Catch [[id number]]  (Normal) Collected: 08/01/25 0043    Specimen: Urine, Clean Catch Updated: 08/01/25 0109     THC, Screen, Urine Negative     Phencyclidine (PCP), Urine Negative     Cocaine Screen, Urine Negative     Methamphetamine, Ur Negative     Opiate Screen Negative     Amphetamine Screen, Urine Negative     Benzodiazepine Screen, Urine Negative     Tricyclic Antidepressants Screen Negative     Methadone Screen, Urine Negative     Barbiturates Screen, Urine Negative     Oxycodone Screen, Urine Negative     Buprenorphine, Screen, Urine Negative    Narrative:      Cutoff For Drugs Screened:    Amphetamines               500 ng/ml  Barbiturates               200 ng/ml  Benzodiazepines            150 ng/ml  Cocaine                    150 ng/ml  Methadone                  200 ng/ml  Opiates                    100 ng/ml  Phencyclidine               25 ng/ml  THC                         50 ng/ml  Methamphetamine            500 ng/ml  Tricyclic Antidepressants  300 ng/ml  Oxycodone                  100 ng/ml  Buprenorphine               10 ng/ml    The normal value for all drugs tested is negative. This report includes  unconfirmed screening results, with the cutoff values listed, to be used for medical treatment purposes only.  Unconfirmed results must not be used for non-medical purposes such as employment or legal testing.  Clinical consideration should be applied to any drug of abuse test, particularly when unconfirmed results are used.      Fentanyl, Urine - Urine, Clean Catch [[id number]]  (Normal) Collected: 08/01/25 0043    Specimen: Urine, Clean Catch Updated: 08/01/25 0111     Fentanyl, Urine Negative    Narrative:      Negative Threshold:      Fentanyl 5 ng/mL     The normal value for the drug tested is negative. This report includes final unconfirmed screening results to be used for medical treatment purposes only. Unconfirmed results must not be used for non-medical purposes such as employment or legal testing. Clinical consideration should be applied to any drug of abuse test, particularly when unconfirmed results are used.                    Imaging:    No Radiology Exams Resulted Within Past 24 Hours      Differential Diagnosis and Discussion:    Paresthesia: Differential diagnosis includes but is not limited to acute stroke, electrolyte imbalance, anxiety, radiculopathy, autoimmune disorders, and endocrine disorders.    PROCEDURES:    Labs were collected in the emergency department and all labs were reviewed and interpreted by me.  X-ray were performed in the emergency department and all X-ray impressions were independently interpreted by me.  An EKG was performed and the EKG was interpreted by me.    ECG 12 Lead Syncope   Preliminary Result   HEART RATE=78  bpm   RR Qjlcztsl=451  ms   AR Megycfcy=309  ms   P Horizontal Axis=-5  deg   P Front Axis=63  deg   QRSD Interval=90  ms   QT Yvadznkg=750  ms   PXyQ=610  ms   QRS Axis=72  deg   T Wave Axis=65  deg   - OTHERWISE NORMAL ECG -   Sinus rhythm   RSR' in V1 or V2, right VCD or RVH   Date and Time of Study:2025-07-31 21:59:10          Procedures    MDM                      Patient Care Considerations:    NARCOTICS: I considered prescribing opiate pain medication as an outpatient, however .  No pain control required in the ED.      Consultants/Shared Management Plan:    None    Social Determinants of Health:    Patient has presented with family members who are responsible, reliable and will ensure follow up care.      Disposition and Care Coordination:    Discharged: The patient is suitable and stable for discharge with no need for consideration of admission.    I have explained the patient´s condition, diagnoses and treatment plan based on the information available to me at this time. I have answered questions and addressed any concerns. The patient has a good  understanding of the patient´s diagnosis, condition, and treatment plan as can be expected at this point. The vital signs have been stable. The patient´s condition is stable and appropriate for discharge from the emergency department.      The patient will pursue further outpatient evaluation with the primary care physician or other designated or consulting physician as outlined in the discharge instructions. They are agreeable to this plan of care and follow-up instructions have been explained in detail. The patient has received these instructions in written format and has expressed an understanding of the discharge instructions. The patient is aware that any significant change in condition or worsening of symptoms should prompt an immediate return to this or the closest emergency department or call to 911.  I have explained discharge medications and the need for follow up with the patient/caretakers. This was also printed in the discharge instructions. Patient was discharged with the following medications and follow up:      Medication List      No changes were made to your prescriptions during this visit.      Veronica Chu, APRN  [address]  [address]  [place] [address]  [phone]    Schedule an  appointment as soon as possible for a visit          Final diagnoses:   Dehydration   Heat exposure, initial encounter   Anxiety reaction        ED Disposition       ED Disposition   Discharge    Condition   Stable    Comment   --               This medical record created using voice recognition software.             Leon Beavers MD  08/01/25 0632

## 2025-08-05 ENCOUNTER — TELEPHONE (OUTPATIENT)
Age: 40
End: 2025-08-05
Payer: MEDICAID

## 2025-08-07 ENCOUNTER — RESULTS FOLLOW-UP (OUTPATIENT)
Dept: CARDIOLOGY | Facility: CLINIC | Age: 40
End: 2025-08-07
Payer: MEDICAID

## 2025-08-13 LAB
QT INTERVAL: 376 MS
QTC INTERVAL: 428 MS

## (undated) DEVICE — TEMP PACING WIRE: Brand: MYO/WIRE

## (undated) DEVICE — CANN ART EOPA 3D NV W/CONN 20F

## (undated) DEVICE — Device

## (undated) DEVICE — SOL ISO/ALC 70PCT 4OZ

## (undated) DEVICE — PK HEART OPN 40

## (undated) DEVICE — BIOPATCH™ ANTIMICROBIAL DRESSING WITH CHLORHEXIDINE GLUCONATE IS A HYDROPHILLIC POLYURETHANE ABSORPTIVE FOAM WITH CHLORHEXIDINE GLUCONATE (CHG) WHICH INHIBITS BACTERIAL GROWTH UNDER THE DRESSING. THE DRESSING IS INTENDED TO BE USED TO ABSORB EXUDATE, COVER A WOUND CAUSED BY VASCULAR AND NONVASCULAR PERCUTANEOUS MEDICAL DEVICES DURING SURGERY, AS WELL AS REDUCE LOCAL INFECTION AND COLONIZATION OF MICROORGANISMS.: Brand: BIOPATCH

## (undated) DEVICE — 28 FR STRAIGHT – SOFT PVC CATHETER: Brand: PVC THORACIC CATHETERS

## (undated) DEVICE — CANN RETRGR STYLET RSCP 15F

## (undated) DEVICE — SUT POLY BR TP 2STRND 1/8X30IN

## (undated) DEVICE — DRP SLUSH WARMR MACH RECTG 66X44IN

## (undated) DEVICE — SPNG GZ WOVN 4X4IN 12PLY 10/BX STRL

## (undated) DEVICE — MARKR SKIN W/RULR AND LBL

## (undated) DEVICE — SENSR CERBRL O2 PK/2

## (undated) DEVICE — SOL IRR NACL 0.9PCT BT 1000ML

## (undated) DEVICE — ST. SORBAVIEW ULTIMATE IJ SYSTEM A,C: Brand: CENTURION

## (undated) DEVICE — SOL NACL 0.9PCT 1000ML

## (undated) DEVICE — SOL IRR H2O BTL 1000ML STRL

## (undated) DEVICE — PK ATS CUST W CARDIOTOMY RESEVOIR

## (undated) DEVICE — LABEL SHEET CUSTOM 2X2 YELLOW: Brand: MEDLINE INDUSTRIES, INC.

## (undated) DEVICE — TBG ART PRESS 60 IN

## (undated) DEVICE — CONN STR 1/2INX3/8IN

## (undated) DEVICE — BG TRANSF W/COUPLER SPK 600ML

## (undated) DEVICE — CATH VENT DLP W/CONN MALL NOVNT SILICON 16FR 16IN

## (undated) DEVICE — GLV SURG BIOGEL LTX PF 7 1/2

## (undated) DEVICE — AVID DUAL STAGE VENOUS DRAINAGE CANNULA: Brand: AVID DUAL STAGE VENOUS DRAINAGE CANNULA

## (undated) DEVICE — SUT ETHIBOND 2/0 CV V5  30IN PXX52

## (undated) DEVICE — SYS PERFUS SEP PLATLT W TIPS CUST

## (undated) DEVICE — ADAPT ANTEGRADE RETRGR

## (undated) DEVICE — GLV SURG BIOGEL LTX PF 6 1/2

## (undated) DEVICE — OASIS DRAIN, SINGLE, INLINE & ATS COMPATIBLE: Brand: OASIS

## (undated) DEVICE — IRRIGATOR BULB ASEPTO 60CC STRL

## (undated) DEVICE — ORGANIZER SUT SHELIGH 3T 213013

## (undated) DEVICE — PK PERFUS CUST W/CARDIOPLEGIA

## (undated) DEVICE — CLAMP INSERT: Brand: STEALTH® CLAMP INSERT

## (undated) DEVICE — CATHETER,URETHRAL,REDRUBBER,STERILE,20FR: Brand: MEDLINE

## (undated) DEVICE — 28 FR RIGHT ANGLE – SOFT PVC CATHETER: Brand: PVC THORACIC CATHETERS

## (undated) DEVICE — 8 FOOT DISPOSABLE EXTENSION CABLE WITH SAFE CONNECT / ALLIGATOR CLIP

## (undated) DEVICE — CVR PROB 96IN LF STRL

## (undated) DEVICE — ST TOURNI COMPL A/ 7IN

## (undated) DEVICE — CANN AORT ROOT DLP VNT 14G 7F

## (undated) DEVICE — OPTIFOAM GENTLE SA, POSTOP, 4X12: Brand: MEDLINE

## (undated) DEVICE — HEMOCONCENTRATOR PERFUS LPS06

## (undated) DEVICE — DRSNG SURESITE WNDW 2.38X2.75

## (undated) DEVICE — MYOCARDIAL PROBE NON-PYROGENIC: Brand: DEROYAL